# Patient Record
Sex: MALE | Race: WHITE | NOT HISPANIC OR LATINO | Employment: FULL TIME | URBAN - METROPOLITAN AREA
[De-identification: names, ages, dates, MRNs, and addresses within clinical notes are randomized per-mention and may not be internally consistent; named-entity substitution may affect disease eponyms.]

---

## 2017-01-12 ENCOUNTER — ALLSCRIPTS OFFICE VISIT (OUTPATIENT)
Dept: OTHER | Facility: OTHER | Age: 42
End: 2017-01-12

## 2017-01-17 ENCOUNTER — ALLSCRIPTS OFFICE VISIT (OUTPATIENT)
Dept: OTHER | Facility: OTHER | Age: 42
End: 2017-01-17

## 2017-02-23 ENCOUNTER — ALLSCRIPTS OFFICE VISIT (OUTPATIENT)
Dept: OTHER | Facility: OTHER | Age: 42
End: 2017-02-23

## 2017-02-23 DIAGNOSIS — I26.99 OTHER PULMONARY EMBOLISM WITHOUT ACUTE COR PULMONALE (HCC): ICD-10-CM

## 2017-03-01 ENCOUNTER — HOSPITAL ENCOUNTER (OUTPATIENT)
Dept: NON INVASIVE DIAGNOSTICS | Facility: CLINIC | Age: 42
Discharge: HOME | End: 2017-03-01
Payer: COMMERCIAL

## 2017-03-01 DIAGNOSIS — I26.99 OTHER PULMONARY EMBOLISM WITHOUT ACUTE COR PULMONALE (HCC): ICD-10-CM

## 2017-03-01 PROCEDURE — 93970 EXTREMITY STUDY: CPT

## 2017-03-09 ENCOUNTER — ALLSCRIPTS OFFICE VISIT (OUTPATIENT)
Dept: OTHER | Facility: OTHER | Age: 42
End: 2017-03-09

## 2017-04-13 ENCOUNTER — GENERIC CONVERSION - ENCOUNTER (OUTPATIENT)
Dept: OTHER | Facility: OTHER | Age: 42
End: 2017-04-13

## 2017-06-08 ENCOUNTER — ALLSCRIPTS OFFICE VISIT (OUTPATIENT)
Dept: OTHER | Facility: OTHER | Age: 42
End: 2017-06-08

## 2017-07-19 ENCOUNTER — ALLSCRIPTS OFFICE VISIT (OUTPATIENT)
Dept: OTHER | Facility: OTHER | Age: 42
End: 2017-07-19

## 2017-07-25 ENCOUNTER — ALLSCRIPTS OFFICE VISIT (OUTPATIENT)
Dept: OTHER | Facility: OTHER | Age: 42
End: 2017-07-25

## 2017-09-25 ENCOUNTER — ALLSCRIPTS OFFICE VISIT (OUTPATIENT)
Dept: OTHER | Facility: OTHER | Age: 42
End: 2017-09-25

## 2017-09-25 DIAGNOSIS — K76.0 FATTY (CHANGE OF) LIVER, NOT ELSEWHERE CLASSIFIED: ICD-10-CM

## 2017-09-25 DIAGNOSIS — Z13.220 ENCOUNTER FOR SCREENING FOR LIPOID DISORDERS: ICD-10-CM

## 2017-09-25 DIAGNOSIS — I10 ESSENTIAL (PRIMARY) HYPERTENSION: ICD-10-CM

## 2017-09-25 DIAGNOSIS — E55.9 VITAMIN D DEFICIENCY: ICD-10-CM

## 2017-11-30 ENCOUNTER — ALLSCRIPTS OFFICE VISIT (OUTPATIENT)
Dept: OTHER | Facility: OTHER | Age: 42
End: 2017-11-30

## 2017-11-30 LAB
FLUAV AG SPEC QL IA: NEGATIVE
INFLUENZA B AG (HISTORICAL): NEGATIVE

## 2017-12-08 ENCOUNTER — ALLSCRIPTS OFFICE VISIT (OUTPATIENT)
Dept: OTHER | Facility: OTHER | Age: 42
End: 2017-12-08

## 2017-12-08 DIAGNOSIS — J20.9 ACUTE BRONCHITIS: ICD-10-CM

## 2017-12-08 DIAGNOSIS — R04.2 HEMOPTYSIS: ICD-10-CM

## 2017-12-12 NOTE — PROGRESS NOTES
Assessment    1  Acute bronchitis (466 0) (J20 9)   2  Hemoptysis (786 30) (R04 2)    Plan  Acute bronchitis, Hemoptysis    · Promethazine-Codeine 6 25-10 MG/5ML Oral Syrup; TAKE 5 ML BY MOUTH ATBEDTIME AS NEEDED   · * XR CHEST PA & LATERAL; Status:Active; Requested for:36Btb6980;     Discussion/Summary    Bronchitis/Hemoptysis: will check CXR  Pt s/p abx - zithromax  likely your cough is lingering from bronchitis  continue with OTC cough medication during day as well as phernergan with codiene QHS  If no improvement by wednesday pt to contact office  If continues with hemoptysis schedule f/u however as discussed likely small ruptured vessel from repetitive coughing  The patient was counseled regarding instructions for management,-- risk factor reductions,-- prognosis,-- patient and family education,-- impressions  Chief Complaint    1  Cold Symptoms  Pt is here due to coughing      History of Present Illness  HPI: Pt was seen on 11/30 by Dr Natalie Shirley with URI  Was getting worse and was given a z-pack on 12/4 and his s/sx are improving, however still has a lingering cough  Cough:   Zan Duane presents with complaints of gradual onset of intermittent episodes of mild cough  Episodes started 2 weeks ago  Associated symptoms include wheezing, but-- no dyspnea,-- no chills,-- no fever,-- no runny nose,-- no stuffy nose,-- no sore throat,-- no myalgias-- and-- no chest pain  The patient presents with complaints of mild hemoptysis (bright red blood mixed with mucous)      Review of Systems   Constitutional: feeling tired, but-- no fever,-- not feeling poorly,-- no recent weight gain,-- no chills-- and-- no recent weight loss  ENT: hoarseness, but-- no earache,-- no nosebleeds,-- no sore throat,-- no hearing loss-- and-- no nasal discharge  Cardiovascular: the heart rate was not slow,-- no chest pain,-- the heart rate was not fast,-- no palpitations-- and-- no lower extremity edema    Respiratory: shortness of breath,-- cough-- and-- wheezing, but-- as noted in HPI-- and-- no shortness of breath during exertion  Gastrointestinal: no abdominal pain,-- no nausea,-- no vomiting,-- no constipation,-- no diarrhea-- and-- no blood in stools  Musculoskeletal: no arthralgias-- and-- no myalgias  Integumentary: a rash-- and-- itching  Neurological: headache-- and-- dizziness, but-- no numbness-- and-- no tingling  Active Problems  1  Acid reflux (530 81) (K21 9)   2  Acute upper respiratory infection (465 9) (J06 9)   3  Arrhythmia (427 9) (I49 9)   4  Cervicalgia (723 1) (M54 2)   5  Chronic insomnia (780 52) (F51 04)   6  Classic migraine (346 00) (G43 109)   7  Deep vein thrombosis (DVT) of left lower extremity (453 40) (I82 402)   8  Essential hypertension (401 9) (I10)   9  Extrinsic asthma (493 00) (J45 909)   10  Fatty liver (571 8) (K76 0)   11  Generalized anxiety disorder (300 02) (F41 1)   12  Liver enlargement (789 1) (R16 0)   13  Lupus anticoagulant positive (795 79) (R76 0)   14  BHARGAV (obstructive sleep apnea) (327 23) (G47 33)   15  Other acute pulmonary embolism (415 19) (I26 99)   16  Pulmonary embolism (415 19) (I26 99)   17  Screening for lipoid disorders (V77 91) (Z13 220)   18  Snoring (786 09) (R06 83)   19  Viral URI (465 9) (J06 9,B97 89)   20  Vitamin D deficiency (268 9) (E55 9)    Past Medical History  Active Problems And Past Medical History Reviewed: The active problems and past medical history were reviewed and updated today  Surgical History  Surgical History Reviewed: The surgical history was reviewed and updated today         Social History     · Alcohol Use (History)   · DRINKS ALCOHOL VERY INFREQUENTLY   · Caffeine Use   · HE ADMITS TO CONSUMING CAFFEINE VIA SODA ( 2 SERVINGS PER DAY)   · Chewing Tobacco Fine Cut   · Former smoker (C76 19) (I34 942)   · Marital History - Currently    · Never A Smoker   · Never Used Drugs   · Occupation:   ·   The social history was reviewed and updated today  The social history was reviewed and is unchanged  Family History  Family History Reviewed: The family history was reviewed and updated today  Current Meds   1  ALPRAZolam 0 5 MG Oral Tablet; TAKE 1 TABLET  PRN; Last Rx:86Osm6880 Ordered   2  Aspirin 81 MG TABS; TAKE 1 TABLET DAILY; Therapy: (Recorded:69Wav8264) to Recorded   3  Benzonatate 200 MG Oral Capsule; TAKE 1 CAPSULE 3 TIMES DAILY AS NEEDED; Therapy: 62YDG4603 to (Evaluate:65Rzy3096)  Requested for: 22UDS0165; Last Rx:30Nov2017 Ordered   4  Fluticasone Propionate 50 MCG/ACT Nasal Suspension; USE 1 TO 2 SPRAYS IN EACH NOSTRIL ONCE DAILY; Therapy: 82BHK5900 to (Last Rx:30Nov2017)  Requested for: 10WUB1311 Ordered   5  SUMAtriptan Succinate 25 MG Oral Tablet; TAKE 1 TABLET FOR MIGRAINE RELIEF  MAY REPEAT EVERY 2 HOURS  MAX 200MG/DAY; Therapy: 73BFT1795 to (Last Rx:17Oct2016) Ordered   6  TraMADol HCl - 50 MG Oral Tablet; TAKE 1 TABLET Every 6 hours PRN pain; Therapy: 32PVH3528 to (); Last Rx:34Jnm3335 Ordered   7  Venlafaxine HCl  MG Oral Capsule Extended Release 24 Hour; TAKE 2 CAPSULES DAILY  Requested for: 96Xdz8026; Last Rx:27Tya0896 Ordered   8  Zolpidem Tartrate 10 MG Oral Tablet; TAKE 1 TABLET AT BEDTIME; Therapy: 26MWW6222 to (Evaluate:45Dir2315); Last Rx:06Oct2017 Ordered    The medication list was reviewed and updated today  Allergies  1  Lidoderm PTCH  2  No Known Environmental Allergies   3  No Known Food Allergies  Denied    4  Lidocream CREA    Vitals   Recorded: [id number] 03:45PM   Temperature 97 9 F, Oral   Heart Rate 629   Systolic 271, LUE, Sitting   Diastolic 88, LUE, Sitting   Height 5 ft 8 in   Weight 210 lb 4 oz   BMI Calculated 31 97   BSA Calculated 2 09   O2 Saturation 98, RA       Physical Exam   Constitutional  General appearance: No acute distress, well appearing and well nourished  Eyes  Conjunctiva and lids: No swelling, erythema, or discharge     Pupils and irises: Equal, round and reactive to light  Ears, Nose, Mouth, and Throat  External inspection of ears and nose: Normal    Otoscopic examination: Tympanic membrance translucent with normal light reflex  Canals patent without erythema  Nasal mucosa, septum, and turbinates: Normal without edema or erythema  Oropharynx: Normal with no erythema, edema, exudate or lesions  -- No blood noted  Pulmonary  Respiratory effort: No increased work of breathing or signs of respiratory distress  Auscultation of lungs: Clear to auscultation, equal breath sounds bilaterally, no wheezes, no rales, no rhonci  -- no rhonchi or wheezing  Cardiovascular  Auscultation of heart: Normal rate and rhythm, normal S1 and S2, without murmurs  Musculoskeletal  Gait and station: Normal    Psychiatric  Orientation to person, place and time: Normal    Mood and affect: Normal          Future Appointments    Date/Time Provider Specialty Site   01/02/2018 05:00 PM Kapil Butler DO Internal Medicine [facility]       Signatures   Electronically signed by : [employer], [facility];  Dec  8 2017  5:14PM EST                       (Author)    Electronically signed by : Shawn Burkitt, MD; Dec 11 2017  5:21PM EST

## 2017-12-13 ENCOUNTER — TRANSCRIBE ORDERS (OUTPATIENT)
Dept: ADMINISTRATIVE | Age: 42
End: 2017-12-13

## 2017-12-13 ENCOUNTER — APPOINTMENT (OUTPATIENT)
Dept: RADIOLOGY | Age: 42
End: 2017-12-13
Payer: COMMERCIAL

## 2017-12-13 DIAGNOSIS — J20.9 ACUTE BRONCHITIS: ICD-10-CM

## 2017-12-13 DIAGNOSIS — R04.2 HEMOPTYSIS: ICD-10-CM

## 2017-12-13 PROCEDURE — 71020 HB CHEST X-RAY 2VW FRONTAL&LATL: CPT

## 2017-12-14 ENCOUNTER — ALLSCRIPTS OFFICE VISIT (OUTPATIENT)
Dept: OTHER | Facility: OTHER | Age: 42
End: 2017-12-14

## 2018-01-02 ENCOUNTER — ALLSCRIPTS OFFICE VISIT (OUTPATIENT)
Dept: OTHER | Facility: OTHER | Age: 43
End: 2018-01-02

## 2018-01-03 NOTE — PROGRESS NOTES
Assessment   1  Acute upper respiratory infection (465 9) (J06 9)   2  Cough (786 2) (R05)    Plan   Acute bronchitis    · LevoFLOXacin 500 MG Oral Tablet (Levaquin)   · PredniSONE 10 MG Oral Tablet  Acute bronchitis, Acute upper respiratory infection    · Albuterol Sulfate (2 5 MG/3ML) 0 083% Inhalation Nebulization Solution   · MINI NEBULIZER- POC; Status:Complete - Retrospective By Protocol Authorization;   Done:    95WQN2803 05:44PM  Acute upper respiratory infection, Cough    · ProAir  (90 Base) MCG/ACT Inhalation Aerosol Solution; INHALE 1 TO 2 PUFFS EVERY    4 TO 6 HOURS AS NEEDED  Generalized anxiety disorder    · ALPRAZolam 0 5 MG Oral Tablet; TAKE 1 TABLET  PRN   · Venlafaxine HCl  MG Oral Capsule Extended Release 24 Hour (Effexor XR); TAKE 2    CAPSULES DAILY    Discussion/Summary   Discussion Summary:    Pt counseled to take levaquin with food to be taken in am with food - instructed proper way to take this  next week if not improving offered to keep pt out of work for a few days to help him recouperate but he states he is unable to do so also instructed to report any hemoptysis or SOB  Chief Complaint   Chief Complaint Free Text Note Form: pt  presents for follow up for URI  pt  states has cough with mucous  pt  states has been taking Promethazine with codeine  pt  would like Med refills      History of Present Illness   HPI: f/u from [address] [place] [address] Rd 12/8/17  Pt was initially seen 11/30/17 for URI and tx with zpak which he reports did not improve his symptoms, he has continued with cough with coughing fits and wheezing noted, worse at night  Pt was given promethazine/codeine which he has taken at bedtime which helps sleep somewhat but still wakes up coughing  Hx of tobacco use but denies current use  He had also noted hemoptysis at last visit (small blood tinged sputum) which he reports is gone now   CXR done 12/13/17 negative for pneumonia f/up from last visit, completed prednisone and levoquin, had negative CXR, 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, all other s/s resolved  denies fevers or chills, has continued to work bc he isn't able to take off and he works outside in the cold air which seems to exacerbate his symptoms      Review of Systems   Complete-Male:      Constitutional: feeling tired, but-- no fever,-- not feeling poorly-- and-- no chills  Eyes: no eye pain-- and-- no purulent discharge from the eyes  ENT: no earache-- and-- no nasal discharge  Cardiovascular: no chest pain,-- no palpitations-- and-- no extremity edema  Respiratory: cough-- and-- wheezing, but-- as noted in HPI  Gastrointestinal: no abdominal pain-- and-- no constipation  Genitourinary: no dysuria  Musculoskeletal: no arthralgias-- and-- no myalgias  Integumentary: no rashes-- and-- no itching  Neurological: no headache-- and-- no dizziness  Hematologic/Lymphatic: no swollen glands  Active Problems   1  Acid reflux (530 81) (K21 9)   2  Acute bronchitis (466 0) (J20 9)   3  Acute upper respiratory infection (465 9) (J06 9)   4  Arrhythmia (427 9) (I49 9)   5  Cervicalgia (723 1) (M54 2)   6  Chronic insomnia (780 52) (F51 04)   7  Classic migraine (346 00) (G43 109)   8  Deep vein thrombosis (DVT) of left lower extremity (453 40) (I82 402)   9  Essential hypertension (401 9) (I10)   10  Extrinsic asthma (493 00) (J45 909)   11  Fatty liver (571 8) (K76 0)   12  Generalized anxiety disorder (300 02) (F41 1)   13  Hemoptysis (786 30) (R04 2)   14  Liver enlargement (789 1) (R16 0)   15  Lupus anticoagulant positive (795 79) (R76 0)   16  BHARGAV (obstructive sleep apnea) (327 23) (G47 33)   17  Other acute pulmonary embolism (415 19) (I26 99)   18  Pulmonary embolism (415 19) (I26 99)   19  Snoring (786 09) (R06 83)   20  Vitamin D deficiency (268 9) (E55 9)    Past Medical History   1  History of Abdominal pain (789 00) (R10 9)   2   History of Accelerated essential hypertension (401 0) (I10)   3  History of Acute conjunctivitis of right eye, unspecified acute conjunctivitis type (372 00) (H10 31)   4  History of Acute intractable headache, unspecified headache type (784 0) (R51)   5  Denied: History of Carrier Of STD   6  History of Colitis (558 9) (K52 9)   7  History of Elevated ALT measurement (790 4) (R74 0)   8  History of Encounter for male sterilization procedure (V25 2) (Z30 2)   9  History of Encounter for sterilization (V25 2) (Z30 2)   10  History of headache (V13 89) (Z87 898)   11  History of sinusitis (V12 69) (Z87 09)   12  History of syncope (V15 89) (Z87 898)   13  History of syncope (V15 89) (Z87 898)   14  History of Holter monitor, abnormal (794 31) (R94 31)   15  History of Knee pain (719 46) (M25 569)   16  History of Meniscus tear (836 2) (S83 209A)   17  Denied: History of Mental Status Change   18  History of Nonvenomous Insect Bite (E906 4)   19  History of Viral URI (465 9) (J06 9,B97 89)    Surgical History   1  History of Hernia Repair   2  History of Knee Arthroscopy (Therapeutic)   3  History of Shoulder Surgery Left   4  History of Surgery Vas Deferens Vasectomy    Family History   Mother    1  Family history of Type 2 Diabetes Mellitus  Father    2  Family history of hypertension (V17 49) (Z82 49)   3  Family history of Type 2 Diabetes Mellitus  Sister    4  Family history of Cancer  Brother    5  Family history of Cancer   6  Family history of hypertension (V17 49) (Z82 49)   7  Family history of Fatty Liver  Grandparent    8  Family history of Cancer  Paternal Grandmother    5  Family history of hypertension (V17 49) (Z82 49)   10  Family history of Type 2 Diabetes Mellitus  Maternal Grandfather    11  Family history of Cancer   12  Family history of Family Health Status 3  Children Living   15  Family history of Travel  Paternal Grandfather    15  Family history of hypertension (V17 49) (Z82 49)  Maternal Aunt    13   Family history of Cancer    Social History    · Alcohol Use (History)   · Caffeine Use   · Chewing Tobacco Fine Cut   · Former smoker (Q11 76) (Z62 058)   · Marital History - Currently    · Never A Smoker   · Never Used Drugs   · Occupation:    Current Meds    1  ALPRAZolam 0 5 MG Oral Tablet; TAKE 1 TABLET  PRN; Last Rx:79Ktn5418 Ordered   2  Aspirin 81 MG TABS; TAKE 1 TABLET DAILY; Therapy: (Recorded:55Ete9993) to Recorded   3  Fluticasone Propionate 50 MCG/ACT Nasal Suspension; USE 1 TO 2 SPRAYS IN EACH NOSTRIL     ONCE DAILY; Therapy: 19ZYE6617 to (Last Rx:30Nov2017)  Requested for: 79IZD1638 Ordered   4  LevoFLOXacin 500 MG Oral Tablet; TAKE 1 TABLET DAILY AS DIRECTED; Therapy: 76KGT4654 to (Evaluate:21Lno2002)  Requested for: 85Mxo6688; Last Rx:58Hrf6112     Ordered   5  PredniSONE 10 MG Oral Tablet; 4 tabs x 2 days then 3 tabs x 2 days then 2 tabs x 2 days     then 1 tab x 2 days; Therapy: 46SWZ8743 to (Last Rx:08Kqm7291)  Requested for: 68JHQ6629 Ordered   6  Promethazine-Codeine 6 25-10 MG/5ML Oral Syrup; TAKE 5 ML EVERY 4 TO 6 HOURS AS NEEDED; Therapy: 64XZB4132 to (Evaluate:12Png2313); Last Rx:54Wmr3358 Ordered   7  SUMAtriptan Succinate 25 MG Oral Tablet; TAKE 1 TABLET FOR MIGRAINE RELIEF  MAY REPEAT     EVERY 2 HOURS  MAX 200MG/DAY; Therapy: 63EFJ9052 to (Last Rx:17Oct2016) Ordered   8  TraMADol HCl - 50 MG Oral Tablet; TAKE 1 TABLET Every 6 hours PRN pain; Therapy: 86SZB7058 to (); Last Rx:82Hfz7900 Ordered   9  Venlafaxine HCl  MG Oral Capsule Extended Release 24 Hour; TAKE 2 CAPSULES DAILY      Requested for: 22Jtb5508; Last Rx:25Rti2526 Ordered   10  Zolpidem Tartrate 10 MG Oral Tablet; TAKE 1 TABLET AT BEDTIME; Therapy: 75WTO7362 to (Evaluate:69Ymg9425); Last Rx:06Oct2017 Ordered  Medication List Reviewed: The medication list was reviewed and updated today  Allergies   1  Lidoderm PTCH  2  No Known Environmental Allergies   3   No Known Food Allergies  Denied    4  Lidocream CREA    Vitals   Vital Signs    Recorded: 27NBB2352 04:44PM   Temperature 97 1 F, Tympanic   Heart Rate 92   Systolic 242, LUE, Sitting   Diastolic 90, LUE, Sitting   Height 5 ft 8 in   Weight 216 lb 2 oz   BMI Calculated 32 86   BSA Calculated 2 11   O2 Saturation 99, RA     Physical Exam        Constitutional      General appearance: No acute distress, well appearing and well nourished  Eyes      Conjunctiva and lids: No swelling, erythema, or discharge  Ears, Nose, Mouth, and Throat      External inspection of ears and nose: Normal        Otoscopic examination: Tympanic membrance translucent with normal light reflex  Canals patent without erythema  Nasal mucosa, septum, and turbinates: Normal without edema or erythema  Oropharynx: Normal with no erythema, edema, exudate or lesions  Pulmonary      Auscultation of lungs: Abnormal  -- exp wheeze b/l (pronlonged exp) no rhonchi  Cardiovascular      Auscultation of heart: Normal rate and rhythm, normal S1 and S2, without murmurs  Lymphatic      Palpation of lymph nodes in neck: No lymphadenopathy  Musculoskeletal      Gait and station: Normal        Skin      Skin and subcutaneous tissue: Normal without rashes or lesions  Psychiatric      Orientation to person, place and time: Normal        Mood and affect: Normal           Results/Data   MINI NEBULIZER- POC [id number] 05:44PM Kaylin Baltazar      Test Name Result Flag Reference   Romi Jones [id number]          Procedure           Treatment #1 included Albuterol Sulfate 2 5 mg  After the first nebulizer treatment, examination at 10 revealed  After treatment #1, the patient noted symptomatic improvement  Air exchange was improved  No wheezes were appreciated  No rhonchi were appreciated  No rales were appreciated                          Signatures    Electronically signed by : Nohelia Barrera DO; Jan 2 2018  6:00PM EST (Author)

## 2018-01-09 NOTE — MISCELLANEOUS
To Whom This May Concern,    Mr  Kaila Grier has recently undergone testing that confirms he requires Continuous Airway Pressure for use at night  A CPAP machine, mask, tubing, filters, and other required equipment are medically  necessary for the patient's use to properly address his worsening health condition of obstructive sleep apnea  For further questions, please contact our office       Sincerely,   Reema Chang PA-C      Electronically signed Erika Flower [facility]  Apr 13 2017  5:25PM EST

## 2018-01-10 NOTE — RESULT NOTES
Verified Results  (1) PT WITH INR [id number] 03:58PM Hayley Archana     Test Name Result Flag Reference   INR 2 49 H 0 86-1 16   PT 25 7 seconds H 11 8-14 1

## 2018-01-12 VITALS
DIASTOLIC BLOOD PRESSURE: 82 MMHG | OXYGEN SATURATION: 98 % | BODY MASS INDEX: 33.38 KG/M2 | SYSTOLIC BLOOD PRESSURE: 122 MMHG | TEMPERATURE: 98.1 F | WEIGHT: 220.25 LBS | HEIGHT: 68 IN | HEART RATE: 91 BPM

## 2018-01-12 NOTE — RESULT NOTES
Verified Results  (1) PT WITH INR [id number] 12:46PM Meg Red     Test Name Result Flag Reference   INR 1 28 H 0 86-1 16   PT 16 0 seconds H 12 0-14 3

## 2018-01-13 VITALS
OXYGEN SATURATION: 99 % | HEART RATE: 92 BPM | DIASTOLIC BLOOD PRESSURE: 82 MMHG | TEMPERATURE: 97.2 F | WEIGHT: 220.38 LBS | HEIGHT: 67 IN | RESPIRATION RATE: 16 BRPM | SYSTOLIC BLOOD PRESSURE: 120 MMHG | BODY MASS INDEX: 34.59 KG/M2

## 2018-01-13 VITALS
RESPIRATION RATE: 18 BRPM | BODY MASS INDEX: 32.61 KG/M2 | OXYGEN SATURATION: 98 % | TEMPERATURE: 98.5 F | HEART RATE: 88 BPM | DIASTOLIC BLOOD PRESSURE: 64 MMHG | SYSTOLIC BLOOD PRESSURE: 96 MMHG | HEIGHT: 68 IN | WEIGHT: 215.2 LBS

## 2018-01-13 VITALS
TEMPERATURE: 96.9 F | HEIGHT: 67 IN | RESPIRATION RATE: 16 BRPM | OXYGEN SATURATION: 99 % | WEIGHT: 211.38 LBS | DIASTOLIC BLOOD PRESSURE: 82 MMHG | SYSTOLIC BLOOD PRESSURE: 130 MMHG | HEART RATE: 94 BPM | BODY MASS INDEX: 33.18 KG/M2

## 2018-01-13 VITALS
HEIGHT: 67 IN | BODY MASS INDEX: 33.59 KG/M2 | WEIGHT: 214 LBS | TEMPERATURE: 97.9 F | SYSTOLIC BLOOD PRESSURE: 118 MMHG | OXYGEN SATURATION: 98 % | DIASTOLIC BLOOD PRESSURE: 90 MMHG | HEART RATE: 91 BPM

## 2018-01-13 NOTE — PROCEDURES
Procedures by Sacha Yadav MD  at 10/10/2016  4:59 PM      Author:  Sacha Yadav MD Service:  Neurology Author Type:  Physician     Filed:  10/10/2016  5:04 PM Date of Service:  10/10/2016  4:59 PM Status:  Signed     :  Sacha Yadav MD (Physician)            ELECTROENCEPHALOGRAM (EEG)      Patient Name:  Andreas Whitlock  MRN: [id number]   :  1975 File #: Benoit Franco 13   Age: 46 y o  Encounter #: [id number]   Date performed:10/10/16            Report date: 10/10/2016          Study type: Routine EEG    ICD 10 diagnosis: Spells/Fit NOS R56 9    Start time: 14:44 End time: 15:22     -------------------------------------------------------------------------------------------------------------------   Patient History:  46year old male with history of DVT and PE presents with acute change in mental status  On day of admission, patient awoke with nausea and vomiting with 2 days  of diarrhea prior  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 and went to lie down  When wife went to check on him, patient was unresponsive and eyes were rolled to back of his head  EMS was called  It was reported  that patient was in and out of consciousness, but he only remembers being at the game and then waking up in the hospital  Patient had no seizure like movements  PMH of seizures or family history EEG was ordered to check for epileptiform  discharges      Medications include:   magnesium oxide 400 mg Oral Daily   nicotine 1 patch Transdermal Daily   venlafaxine 300 mg Oral Daily   warfarin 2 5 mg Oral Once per day on Sun Tue Wed Thu Sat   warfarin 5 mg Oral Once per day on        -------------------------------------------------------------------------------------------------------------------   Description of Procedure:  ·  32 channel digital recording with electrodes placed according to the International 10-20 system with additional  T1/T2 electrodes, EOG, EKG, and simultaneous  video  The recording was technically satisfactory  -------------------------------------------------------------------------------------------------------------------   Results:  Background Activity:  The recording was performed with the patient awake  During wakefulness, there were long runs of well regulated/regular,  mid amplitude, posteriorly dominant, symmetric 8-9 Hz activity that  did attenuate with eye opening      -------------------------------------------------------------------------------------------------------------------   Activation Procedures:  Hyperventilation was performed for 3-4  minutes and did not produce any changes  Stepped photic stimulation between 1-20 cps was performed and  did not induce any changes  -------------------------------------------------------------------------------------------------------------------   Abnormal Findings:  See Background Activity  No focal abnormalities or interictal epileptiform discharges were noted  No electrographic seizures were observed during this recording     -------------------------------------------------------------------------------------------------------------------  Other Findings: The single lead ECG demonstrated a regular rhythm     -------------------------------------------------------------------------------------------------------------------  Events:  No patient push button events  -------------------------------------------------------------------------------------------------------------------   EEG Interpretation:   Normal 30 minute EEG  Scribe Attestation:  Documented by Santiago Bloom, acting as a scribe for Dr Jose Blum on 10/10/2016  at 5:03 PM     Physician Attestation:  All documentation recorded by the scribe accurately reflects the service I personally performed and the decisions made by me   I was present during the time the encounter was recorded and have reviewed all the documentation and confirm that it is all accurate  and representative of the encounter  Elyse Vega MD   Medical Director  [address] [facility]  Pager # [phone]    Oct 10 2016  5:04PM [facility] Standard Time

## 2018-01-14 VITALS
BODY MASS INDEX: 31.7 KG/M2 | SYSTOLIC BLOOD PRESSURE: 130 MMHG | TEMPERATURE: 98.1 F | OXYGEN SATURATION: 98 % | WEIGHT: 214 LBS | HEIGHT: 69 IN | HEART RATE: 80 BPM | DIASTOLIC BLOOD PRESSURE: 88 MMHG

## 2018-01-14 VITALS
HEIGHT: 68 IN | OXYGEN SATURATION: 98 % | DIASTOLIC BLOOD PRESSURE: 78 MMHG | TEMPERATURE: 99.4 F | SYSTOLIC BLOOD PRESSURE: 108 MMHG | HEART RATE: 92 BPM | BODY MASS INDEX: 32.28 KG/M2 | WEIGHT: 213 LBS

## 2018-01-14 NOTE — PROGRESS NOTES
Chief Complaint  pt  presents for Holter Monitor hook up  Started at 4:45 PM  ordered by Dr Jazzy Feng  Dx: 149 9 Holter Removed at 4:47 PM      Active Problems    1  Accelerated essential hypertension (401 0) (I10)   2  Acid reflux (530 81) (K21 9)   3  History of Active smoker (305 1) (Z72 0)   4  Acute intractable headache, unspecified headache type (784 0) (R51)   5  Arrhythmia (427 9) (I49 9)   6  Cervicalgia (723 1) (M54 2)   7  Deep vein thrombosis (DVT) of left lower extremity (453 40) (I82 402)   8  Essential hypertension (401 9) (I10)   9  Extrinsic asthma (493 00) (J45 909)   10  Fatty liver (571 8) (K76 0)   11  Generalized anxiety disorder (300 02) (F41 1)   12  Headache (784 0) (R51)   13  Liver enlargement (789 1) (R16 0)   14  BHARGAV (obstructive sleep apnea) (327 23) (G47 33)   15  Other acute pulmonary embolism (415 19) (I26 99)   16  Sleep disorder (780 50) (G47 9)   17  Snoring (786 09) (R06 83)   18  Syncope (780 2) (R55)   19  Vitamin D deficiency (268 9) (E55 9)    Current Meds   1  ALPRAZolam 0 5 MG Oral Tablet; TAKE 1 TABLET  PRN; Therapy: (Recorded:17Oct2016) to Recorded   2  Eliquis 5 MG Oral Tablet; Take 1 tablet twice daily; Therapy: 18YMK0896 to (Evaluate:03Ncv8677); Last Rx:07Nov2016 Ordered   3  Gabapentin 100 MG Oral Capsule; TAKE 1 CAPSULE AT BEDTIME; Therapy: 47SHZ6544 to (Last Rx:17Oct2016)  Requested for: 17Oct2016 Ordered   4  Magnesium Oxide 400 MG Oral Tablet; TAKE 1 TABLET DAILY; Therapy: (Recorded:12Oct2016) to Recorded   5  Riboflavin 100 MG TABS; TAKE 1 TABLET DAILY; Therapy: (Recorded:12Oct2016) to Recorded   6  SUMAtriptan Succinate 25 MG Oral Tablet; TAKE 1 TABLET FOR MIGRAINE RELIEF    MAY REPEAT EVERY 2 HOURS  MAX 200MG/DAY; Therapy: 96VQO1348 to (Last Rx:17Oct2016) Ordered   7  TraMADol HCl - 50 MG Oral Tablet; Take one tablet every six hours as needed for pain; Therapy: 28EOF6944 to (Last Rx:17Oct2016) Ordered   8   Venlafaxine HCl  MG Oral Capsule Extended Release 24 Hour; TAKE 2   CAPSULES DAILY  Requested for: 14Yjd8380; Last Rx:48Dqm7545 Ordered   9  Zolpidem Tartrate 10 MG Oral Tablet; TAKE 1 TABLET AT BEDTIME AS NEEDED    Requested for: 40UHL9557; Last Rx:80Lkg4838 Ordered    Allergies    1  Lidoderm PTCH    2  No Known Environmental Allergies   3  No Known Food Allergies  Denied    4   Lidocream CREA    Future Appointments    Date/Time Provider Specialty Site   03/03/2017 11:40 AM Sonja Hayes MD Neurology [facility] [address] [place] [address]   01/17/2017 05:45 PM Mary Rodriguez DO Internal Medicine [facility]     Signatures   Electronically signed by : Delvis Umana DO; Nov 10 2016  2:05PM EST

## 2018-01-15 NOTE — RESULT NOTES
Verified Results  (1) THROMBOSIS PANEL [id number] 12:54PM Frank Dempsey Order Number: [id number]     Test Name Result Flag Reference   ANTI-THROM  % of Normal     Performing Comments: please include protein c,s, factor 5 leiden  Performing Comments: please include protein c,s, factor 5 leiden  CARDLIP IGA AB <9 APL U/mL  0 - 11   Negative:              <12                            Indeterminate:     12 - 20                            Low-Med Positive: >20 - 80                            High Positive:         >80   CARDLIP IGG AB <9 GPL U/mL  0 - 14   Negative:              <15                            Indeterminate:     15 - 20                            Low-Med Positive: >20 - 80                            High Positive:         >80   CARDLIP IGM AB <9 MPL U/mL  0 - 12   Negative:              <13                            Indeterminate:     13 - 20                            Low-Med Positive: >20 - 80                            High Positive:         >80  Performing Comments: please include protein c,s, factor 5 leiden  Performed at:  [address] [place] [address]  [id number]  : Miranda Kaiser MD, Phone:  [phone]   DILUTE PROTHROMBIN TIME(DPT) 63 1 sec H 0 0 - 55 0   DPT CONFIRM RATIO 0 98 Ratio  0 00 - 1 40   DILUTE STEPHANY VIPER VENOM TIME 69 1 sec H 0 0 - 44 0   PROTEIN C 132 % of Normal     Performing Comments: please include protein c,s, factor 5 leiden  Performing Comments: please include protein c,s, factor 5 leiden  PROTEIN S ACTIVITY 164 % H 63 - 140   An elevated protein S activity is of no known clinical significance  Protein S activity may be falsely increased (masking an abnormal, low  result) in patients receiving direct Xa inhibitor (e g , rivaroxaban,  apixaban, edoxaban) or a direct thrombin inhibitor (e g , dabigatran)  anticoagulant treatment due to assay interference by these drugs    Performing Comments: please include protein c,s, factor 5 leiden  Performed at:  [address]  [id number]  : Gini Causey MD, Phone:  [phone]   FREE PROTEIN S 128 %  57 - 157   Performing Comments: please include protein c,s, factor 5 leiden  Performed at:  [address]  [id number]  : Gini Causey MD, Phone:  [phone]   TOTAL PROTEIN S 128 %  60 - 150   BETA-2 GLYCOPROTEIN I AB,IGG <9 GPI IgG units  0 - 20   The reference interval reflects a 3SD or 99th percentile interval,  which is thought to represent a potentially clinically significant  result in accordance with the International Consensus Statement on  the classification criteria for definitive antiphospholipid syndrome  (APS)  J Thromb Haem 2006;4:295-306  BETA-2 GLYCOPROTEIN I AB,IGM <9 GPI IgM units  0 - 32   The reference interval reflects a 3SD or 99th percentile interval,  which is thought to represent a potentially clinically significant  result in accordance with the International Consensus Statement on  the classification criteria for definitive antiphospholipid syndrome  (APS)  J Thromb Haem 2006;4:295-306  Performing Comments: please include protein c,s, factor 5 leiden  Performed at:  [address]  [id number]  : Gini Causey MD, Phone:  [phone]   Wiliam Meredith De Gasperi 88 <9 GPI IgA units  0 - 25   The reference interval reflects a 3SD or 99th percentile interval,  which is thought to represent a potentially clinically significant  result in accordance with the International Consensus Statement on  the classification criteria for definitive antiphospholipid syndrome  (APS)  J Thromb Haem 2006;4:295-306     PTT LUPUS ANTICOAGULANT 45 0 sec H 0 0 - 40 6   THROMBIN TIME (DRVW) 16 6 sec  0 0 - 20 9   LUPUS REFLEX INTERPRETATION Comment:     Results are consistent with the presence of a lupus anticoagulant  NOTE: Only persistent lupus anticoagulants are thought to be of  clinical significance  For this reason, repeat testing in 12 or more  weeks after an initial positive result should be considered to  confirm or refute the presence of a lupus anticoagulant,  depending on clinical presentation  Performing Comments: please include protein c,s, factor 5 leiden        Performed at:  [address]  [id number]  : Maninder Adams MD, Phone:  [phone]

## 2018-01-16 NOTE — PROGRESS NOTES
Assessment    1  Deep vein thrombosis (DVT) of left lower extremity (453 40) (I82 402)    Plan  Arrhythmia, Deep vein thrombosis (DVT) of left lower extremity    · (1) APTT; Status:Active; Requested RID:[id number];    Perform:[facility]; KEC:28TGZ7346; Ordered; For:Arrhythmia, Deep vein thrombosis (DVT) of left lower extremity; Ordered By:Lela Frazier;   · (1) CBC/PLT/DIFF; Status:Active; Requested MZX:67BFP0125;    Perform:[facility]; PLM:54KRY1962; Ordered; For:Arrhythmia, Deep vein thrombosis (DVT) of left lower extremity; Ordered By:Lela Frazier;   · (1) D-DIMER; Status:Active; Requested NILDA:[id number];    Perform:[facility]; DTX:13WBJ2019; Ordered; For:Arrhythmia, Deep vein thrombosis (DVT) of left lower extremity; Ordered By:Lela Frazier;   · (1) LUPUS ANTICOAGULANT PROFILE; Status:Active; Requested EGT:37GHH3114;    Perform:[facility]; EKW:83ADJ3040; Ordered; For:Arrhythmia, Deep vein thrombosis (DVT) of left lower extremity; Ordered By:Lela Frazier;   · (1) PT WITH INR; Status:Active; Requested ETY:65UKH5664;    Perform:[facility]; GJI:10FWW4684; Ordered; For:Arrhythmia, Deep vein thrombosis (DVT) of left lower extremity; Ordered By:Lela Frazier;   · Follow-up visit in 3 months Evaluation and Treatment  Follow-up  Status: Complete   Done: 23RDD1699 02:30PM   Ordered; For: Arrhythmia, Deep vein thrombosis (DVT) of left lower extremity; Ordered By: Lexi Davis Performed:  Due: 47ZGW0435; Last Updated By: Noemí Weiss; 3/9/2017 11:28:20 AM    Discussion/Summary  Discussion Summary:   81-year-old male presents for follow-up regarding DVT/anticoagulation  He had second surgery on his left knee in October 2015 and was sent home on aspirin and 3 days later he was hospitalized with blood clot in the leg and lungs  He was sent home on warfarin  In October 2016 he had headache and he passed out  CT scan of brain and MRI scan of brain was negative for acute problem   Warfarin was changed to ELIQUIS 5 mg twice a day and that's what he has been taking and question is how long he has to stay on ELIQUIS  In December 2016 he had hypercoagulation workup that showed positive lupus anticoagulant  There is no history of blood clot prior to October 2015  There is no family history of blood clot  There is no history of bleeding  Repeat laboratory studies in February 2017 showed negative lupus anticoagulant  Repeat venous Doppler study on 3/1/17 showed chronic 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 and paired peroneal veins at midcalf; no superficial thrombophlebitis; no new acute Plan is to continue anticoagulation with Eliquis 2 5 mg b i d  Patient to have laboratory studies in 3 months  If negative and lupus anticoagulant at that time, patient will be transitioned to 81 mg aspirin daily for anticoagulation  Discussed the above with patient  Patient voiced understanding and agreement  He is aware to contact our office with symptoms/questions in the interim  Goals and Barriers: The patient has the current Goals: Anticoagulation minimum 3 more months  The patent has the current Barriers: None  Patient's Capacity to Self-Care: Patient is able to Self-Care  Medication SE Review and Pt Understands Tx: Possible side effects of new medications were reviewed with the patient/guardian today  The treatment plan was reviewed with the patient/guardian  The patient/guardian understands and agrees with the treatment plan   Counseling Documentation With Imm: The patient was counseled regarding diagnostic results, instructions for management, prognosis, patient and family education, impressions, risks and benefits of treatment options, importance of compliance with treatment  total time of encounter was 15 minutes  Chief Complaint  Chief Complaint: Chief Complaint:   The patient presents to the office today with f/ regarding DVT        History of Present Illness  HPI: 46year old male presents for follow-up regarding DVT  He states he had second surgery on his left knee in October 2015 and was sent home on aspirin and 3 days later he was hospitalized with blood clot in the leg and lungs  He was sent home on warfarin  In October 2016 he had headache and he passed out  CT scan of brain and MRI scan of brain was negative for acute problem  Warfarin was changed to ELIQUIS 5 mg twice a day and that's what he has been taking and question is how long he has to stay on ELIQUIS  In December 2016 he had hypercoagulation workup that showed positive lupus anticoagulant  There is no history of blood clot prior to October 2015  There is no family history of blood clot  There is no history of bleeding  Interval History: No complaints today      Review of Systems  Complete-Male:   Constitutional: no fever, not feeling poorly, no chills, not feeling tired and no recent weight loss  Eyes: no eye pain, no eyesight problems, no dryness of the eyes, eyes not red and no itching of the eyes  ENT: no complaints of earache, no hearing loss, no nosebleeds, no nasal discharge, no sore throat, no hoarseness  Cardiovascular: no chest pain, no palpitations and no extremity edema  Respiratory: No complaints of shortness of breath, no wheezing, no cough, no SOB on exertion, no orthopnea or PND  Gastrointestinal: No complaints of abdominal pain, no constipation, no nausea or vomiting, no diarrhea or bloody stools  Genitourinary: no dysuria, no urinary hesitancy, no incontinence and no nocturia  Musculoskeletal: joint stiffness, but no joint swelling, no limb pain, no myalgias and no limb swelling    The patient presents with complaints of arthralgias (osteoarthritis of left knee)  Integumentary: No complaints of skin rash or skin lesions, no itching, no skin wound, no dry skin     Neurological: No compliants of headache, no confusion, no convulsions, no numbness or tingling, no dizziness or fainting, no limb weakness, no difficulty walking  Psychiatric: anxiety  Endocrine: no muscle weakness and no feelings of weakness  Hematologic/Lymphatic: No complaints of swollen glands, no swollen glands in the neck, does not bleed easily, no easy bruising  ROS Reviewed:   ROS reviewed  Active Problems    1  Accelerated essential hypertension (401 0) (I10)   2  Acid reflux (530 81) (K21 9)   3  History of Active smoker (305 1) (Z72 0)   4  Acute intractable headache, unspecified headache type (784 0) (R51)   5  Arrhythmia (427 9) (I49 9)   6  Cervicalgia (723 1) (M54 2)   7  Deep vein thrombosis (DVT) of left lower extremity (453 40) (I82 402)   8  Essential hypertension (401 9) (I10)   9  Extrinsic asthma (493 00) (J45 909)   10  Fatty liver (571 8) (K76 0)   11  Generalized anxiety disorder (300 02) (F41 1)   12  Holter monitor, abnormal (794 31) (R94 31)   13  Liver enlargement (789 1) (R16 0)   14  Lupus anticoagulant positive (795 79) (R76 0)   15  BHARGAV (obstructive sleep apnea) (327 23) (G47 33)   16  Other acute pulmonary embolism (415 19) (I26 99)   17  Pulmonary embolism (415 19) (I26 99)   18  Sleep disorder (780 50) (G47 9)   19  Snoring (786 09) (R06 83)   20  Syncope (780 2) (R55)   21  Vitamin D deficiency (268 9) (E55 9)    Past Medical History    1  History of Abdominal pain (789 00) (R10 9)   2  Denied: History of Carrier Of STD   3  History of Colitis (558 9) (K52 9)   4  History of Cough (786 2) (R05)   5  History of Elevated ALT measurement (790 4) (R74 0)   6  History of Encounter for male sterilization procedure (V25 2) (Z30 2)   7  History of Encounter for sterilization (V25 2) (Z30 2)   8  History of headache (V13 89) (Z87 898)   9  History of sinusitis (V12 69) (Z87 09)   10  History of syncope (V15 89) (Z87 898)   11  History of Knee pain (719 46) (M25 569)   12  History of Meniscus tear (836 2) (S83 209A)   13  Denied: History of Mental Status Change   14   History of Nonvenomous Insect Bite (E906 4)   15  History of Viral URI (465 9) (J06 9,B97 89)    Surgical History    1  History of Hernia Repair   2  History of Knee Arthroscopy (Therapeutic)   3  History of Shoulder Surgery Left   4  History of Surgery Vas Deferens Vasectomy  Surgical History Reviewed: The surgical history was reviewed and updated today  Family History  Mother    1  Family history of Type 2 Diabetes Mellitus  Father    2  Family history of hypertension (V17 49) (Z82 49)   3  Family history of Type 2 Diabetes Mellitus  Sister    4  Family history of Cancer  Brother    5  Family history of Cancer   6  Family history of hypertension (V17 49) (Z82 49)   7  Family history of Fatty Liver  Grandparent    8  Family history of Cancer  Paternal Grandmother    5  Family history of hypertension (V17 49) (Z82 49)   10  Family history of Type 2 Diabetes Mellitus  Maternal Grandfather    11  Family history of Cancer   12  Family history of Family Health Status 3  Children Living   15  Family history of Travel  Paternal Grandfather    15  Family history of hypertension (V17 49) (Z82 49)  Maternal Aunt    13  Family history of Cancer  Family History Reviewed: The family history was reviewed and updated today  Social History    · History of Active smoker (305 1) (Z72 0)   · Alcohol Use (History)   · Caffeine Use   · Chewing Tobacco Fine Cut   · Former smoker (A22 82) (Z87 891)   · Marital History - Currently    · Never A Smoker   · Never Used Drugs   · Occupation:  Social History Reviewed: The social history was reviewed and updated today  The social history was reviewed and is unchanged  Current Meds   1  ALPRAZolam 0 5 MG Oral Tablet; TAKE 1 TABLET  PRN; Therapy: (Recorded:01Hrh8950) to Recorded   2  Eliquis 2 5 MG Oral Tablet; Take 2 tablets daily; Therapy: 88VSI3319 to (Evaluate:21Lna4637)  Requested for: 72GJN2721 Recorded   3   Fluticasone Propionate 50 MCG/ACT Nasal Suspension; USE 1 SPRAY IN EACH   NOSTRIL ONCE DAILY; Therapy: 97XDO7526 to (Last Rx:12Jan2017)  Requested for: 12Jan2017 Ordered   4  Gabapentin 100 MG Oral Capsule; TAKE 1 CAPSULE AT BEDTIME; Therapy: 66CCJ3167 to (Last Rx:17Oct2016)  Requested for: 17Oct2016 Ordered   5  Promethazine-Codeine 6 25-10 MG/5ML Oral Syrup; TAKE 5 ML EVERY 4 TO 6 HOURS   AS NEEDED; Therapy: 69LXS6908 to (Evaluate:25Jan2017); Last Rx:17Jan2017 Ordered   6  SUMAtriptan Succinate 25 MG Oral Tablet; TAKE 1 TABLET FOR MIGRAINE RELIEF  MAY   REPEAT EVERY 2 HOURS  MAX 200MG/DAY; Therapy: 38DYL1741 to (Last Rx:17Oct2016) Ordered   7  TraMADol HCl - 50 MG Oral Tablet; Take one tablet every six hours as needed for pain; Therapy: 39SEQ6330 to (Last Rx:17Evx2520) Ordered   8  Venlafaxine HCl  MG Oral Capsule Extended Release 24 Hour; TAKE 2   CAPSULES DAILY  Requested for: 87ZSC5601; Last Rx:17Jan2017 Ordered   9  Zolpidem Tartrate 10 MG Oral Tablet; TAKE 1 TABLET AT BEDTIME AS NEEDED    Requested for: 95ZHN4369; Last Rx:07Nov2016 Ordered    Allergies    1  Lidoderm PTCH    2  No Known Environmental Allergies   3  No Known Food Allergies  Denied    4  Lidocream CREA    Vitals  Vital Signs    Recorded: 95SCB4805 10:45AM   Temperature 97 4 F   Heart Rate 85   Respiration 16   Systolic 288   Diastolic 82   Height 5 ft 7 in   Weight 214 lb 6 oz   BMI Calculated 33 58   BSA Calculated 2 08   O2 Saturation 98   Pain Scale 0     Physical Exam    Constitutional   General appearance: No acute distress, well appearing and well nourished  Eyes   Conjunctiva and lids: No swelling, erythema, or discharge  Pupils and irises: Equal, round and reactive to light  Ears, Nose, Mouth, and Throat   Oropharynx: Normal with no erythema, edema, exudate or lesions  Pulmonary   Respiratory effort: No increased work of breathing or signs of respiratory distress  Cardiovascular   Auscultation of heart: Normal rate and rhythm, normal S1 and S2, without murmurs      Examination of extremities for edema and/or varicosities: Normal     Abdomen   Abdomen: Non-tender, no masses  Liver and spleen: No hepatomegaly or splenomegaly  Lymphatic   Palpation of lymph nodes in neck: No lymphadenopathy  no axillary lymphadenopathy  Musculoskeletal   Gait and station: Normal     Skin   Skin and subcutaneous tissue: Normal without rashes or lesions  Neurologic   Cranial nerves: Cranial nerves 2-12 intact  Psychiatric   Orientation to person, place and time: Normal     Mood and affect: Normal         ECOG 0       Results/Data  Results Form:   Results   Radiology 3/1/17  Venous Doppler of bilateral lower extremities  Right lower extremity-negative  Left lower extremity-chronic 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 and paired peroneal veins at midcalf; no superficial thrombophlebitis; no new acute thrombosis  Lab Results 2/27/17  PT 10 4, INR 1 0  Lupus anticoagulant negative  WBC 5 1, RBC 4 88, hemoglobin 15 5, platelet 625  D-dimer less than 0 20  VAS LOWER LIMB VENOUS DUPLEX STUDY, COMPLETE BILATERAL 97FLY4670 01:49PM Garry Bustos Order Number: [id number]    - Patient Instructions: To schedule this appointment, please contact Central Scheduling at [phone]  Test Name Result Flag Reference   VAS LOWER LIMB VENOUS DUPLEX STUDY, COMPLETE BILATERAL (Report)     [facility] REPORT   CLINICAL:   Indications: Pulmonary Emboli [I26 99]  Pt  with a history of left DVT in the posterior tibial veins and peroneal   veins  Currently on eliquis  Had previous exams in [facility]    Clinical:         CONCLUSION:      Impression:   RIGHT LOWER LIMB:   No evidence of acute or chronic deep vein thrombosis  No evidence of superficial thrombophlebitis noted  Doppler evaluation shows a normal response to augmentation maneuvers  Popliteal, posterior tibial and anterior tibial arterial Doppler waveforms are   triphasic        LEFT LOWER LIMB: Evaluation shows chronic non-occlusive thrombus in one of the paired posterior   tibial veins and paired peroneal veins at the mid calf  No evidence of superficial thrombophlebitis noted  Doppler evaluation shows a normal response to augmentation maneuvers  Popliteal, posterior tibial and anterior tibial arterial Doppler waveforms are   triphasic        SIGNATURE:   Electronically Signed by: Angela Doe on 2017-03-01 04:04:10 PM     Future Appointments    Date/Time Provider Specialty Site   07/31/2017 04:45 PM Sachin Garcia DO Internal Medicine [address]   06/08/2017 02:30 PM Alexander Moreau MD Hematology Oncology [facility]     Signatures   Electronically signed by : Lainey James, [facility]; Mar 11 2017  3:50PM EST                       (Author)    Electronically signed by : Daniela Archer MD; Mar 12 2017  9:39PM EST                       (Author)    Electronically signed by : Daniela Archer MD; Mar 12 2017  9:39PM EST                       (Author)

## 2018-01-16 NOTE — MISCELLANEOUS
Message  Discussed with the insurance physician they denied the MRA scan of the brain        Signatures   Electronically signed by : Jesse Schmitt MD; Oct 19 2016  1:14PM EST                       (Author)

## 2018-01-17 NOTE — PROGRESS NOTES
Chief Complaint  Pt is here for a sleep study  Ordered by DR Rickey Peraza  Performed by Brannon Ward  Dx sleep disorder G47 9      Active Problems    1  Accelerated essential hypertension (401 0) (I10)   2  Acid reflux (530 81) (K21 9)   3  History of Active smoker (305 1) (Z72 0)   4  Acute intractable headache, unspecified headache type (784 0) (R51)   5  Cervicalgia (723 1) (M54 2)   6  Deep vein thrombosis (DVT) of left lower extremity (453 40) (I82 402)   7  Essential hypertension (401 9) (I10)   8  Extrinsic asthma (493 00) (J45 909)   9  Fatty liver (571 8) (K76 0)   10  Generalized anxiety disorder (300 02) (F41 1)   11  Headache (784 0) (R51)   12  Liver enlargement (789 1) (R16 0)   13  Other acute pulmonary embolism (415 19) (I26 99)   14  Sleep disorder (780 50) (G47 9)   15  Syncope (780 2) (R55)   16  Vitamin D deficiency (268 9) (E55 9)    Current Meds   1  ALPRAZolam 0 5 MG Oral Tablet; TAKE 1 TABLET  PRN; Therapy: (Recorded:17Oct2016) to Recorded   2  Gabapentin 100 MG Oral Capsule (Neurontin); TAKE 1 CAPSULE AT BEDTIME; Therapy: 84BHE7120 to (Last Rx:17Oct2016)  Requested for: 17Oct2016 Ordered   3  Magnesium Oxide 400 MG Oral Tablet; TAKE 1 TABLET DAILY; Therapy: (Recorded:12Oct2016) to Recorded   4  Riboflavin 100 MG TABS; TAKE 1 TABLET DAILY; Therapy: (Recorded:12Oct2016) to Recorded   5  SUMAtriptan Succinate 25 MG Oral Tablet; TAKE 1 TABLET FOR MIGRAINE RELIEF    MAY REPEAT EVERY 2 HOURS  MAX 200MG/DAY; Therapy: 94LDQ6989 to (Last Rx:17Oct2016) Ordered   6  TraMADol HCl - 50 MG Oral Tablet; Take one tablet every six hours as needed for pain; Therapy: 26TTH9286 to (Last Rx:17Oct2016) Ordered   7  Venlafaxine HCl  MG Oral Capsule Extended Release 24 Hour (Effexor XR);   TAKE 2 CAPSULES DAILY  Requested for: 91Xfe4543; Last Rx:42Zne6784 Ordered   8  Warfarin Sodium 2 5 MG Oral Tablet; take as directed; Therapy: (Recorded:17Oct2016) to Recorded   9   Zolpidem Tartrate 10 MG Oral Tablet (Ambien); TAKE 1 TABLET AT BEDTIME AS   NEEDED  Requested for: 08JGZ2713; Last Rx:15Rtq8368 Ordered    Allergies    1  Lidoderm PTCH    2  No Known Environmental Allergies   3  No Known Food Allergies  Denied    4  Lidocream CREA    Vitals  Signs    Systolic: 048, RUE, Sitting  Diastolic: 88, RUE, Sitting  Heart Rate: 92  Temperature: 98 1 F, Oral  O2 Saturation: 98, RA  Height: 5 ft 7 25 in  Weight: 214 lb 2 oz  BMI Calculated: 33 29  BSA Calculated: 2 09    Plan  Sleep disorder    · Sleep Study Unattended - Home; Status:Need Information - Financial Authorization;   Requested for:04Nmw6902;     Future Appointments    Date/Time Provider Specialty Site   03/03/2017 11:40 AM Willam Rodriguez MD Neurology [facility] [address] [place] [address]   11/07/2016 02:30 PM Nicolle Horn DO Internal Medicine [facility]   01/17/2017 05:45 PM Nicolle Horn DO Internal Medicine [facility]     Signatures   Electronically signed by : Vern Rucker, ; Oct 18 2016  1:24PM EST                       (Co-author)    Electronically signed by : Rachel Starkey DO; Oct 18 2016  6:44PM EST

## 2018-01-17 NOTE — MISCELLANEOUS
Message   Date: 16 Sep 2016 5:00 PM EST, Recorded By: Edson Swift For: Thomas Mondragon: Bhavya Breaux, Self   Phone: [phone] (Home)   Patient called the office requesting the results of the CT of the head done on 9/13/16  I informed him that the findings were negative for bleeding and masses  He had no questions at the time of the call  Active Problems    1  Accelerated essential hypertension (401 0) (I10)   2  Acid reflux (530 81) (K21 9)   3  History of Active smoker (305 1) (Z72 0)   4  Acute intractable headache, unspecified headache type (784 0) (R51)   5  Deep vein thrombosis (DVT) of left lower extremity (453 40) (I82 402)   6  Essential hypertension (401 9) (I10)   7  Extrinsic asthma (493 00) (J45 909)   8  Fatty liver (571 8) (K76 0)   9  Generalized anxiety disorder (300 02) (F41 1)   10  Liver enlargement (789 1) (R16 0)   11  Other acute pulmonary embolism (415 19) (I26 99)   12  Vitamin D deficiency (268 9) (E55 9)    Current Meds   1  ALPRAZolam 0 5 MG Oral Tablet (Xanax); take 1 tablet daily prn; Last Rx:16Jun2016   Ordered   2  Butalbital-APAP-Caffeine -40 MG Oral Capsule; TAKE 1 TABLET EVERY 8   HOURS AS NEEDED; Therapy: 24GJB5244 to (Last Rx:23Jcs5670)  Requested for: 23Wcn6209; Status:   ACTIVE - Retrospective Authorization Ordered   3  Fluticasone Propionate 50 MCG/ACT Nasal Suspension; USE AS DIRECTED; Therapy: 21Jan2013 to ([place] Acrthelma)  Requested for: 36PCF8456; Last   Rx:90Mnu5804 Ordered   4  Omeprazole 20 MG Oral Capsule Delayed Release; TAKE 1 CAPSULE DAILY; Therapy: 18MLC8101 to (Evaluate:06Jun2016)  Requested for: 36KQF3499; Last   Rx:08Mar2016 Ordered   5  Venlafaxine HCl  MG Oral Capsule Extended Release 24 Hour (Effexor XR);   TAKE 2 CAPSULES DAILY  Requested for: 42Ybx1600; Last Rx:74Orl9564 Ordered   6   Warfarin Sodium 2 5 MG Oral Tablet (Coumadin); TAKE 1 TABLET DAILY AS DIRECTED    Requested for: 46FUX1563; Last Rx:08Jun2016 Ordered   7  Zolpidem Tartrate 10 MG Oral Tablet (Ambien); TAKE 1 TABLET AT BEDTIME AS   NEEDED  Requested for: 89JMU2560; Last Rx:06Sep2016 Ordered    Allergies    1  Lidoderm PTCH   2  Lidocream CREA    3  No Known Environmental Allergies   4   No Known Food Allergies    Signatures   Electronically signed by : Kay Chin RN; Sep 20 2016 10:24AM EST                       (Author)

## 2018-01-17 NOTE — RESULT NOTES
Verified Results  (1) LUPUS ANTICOAGULANT PROFILE 80AYI9147 12:54PM Scotty Wolf    Order Number: [id number]     Test Name Result Flag Reference   DRVVT MIX INTERPRETATION 47 8 sec H 0 0 - 44 0   Performing Comments: please include protein c,s, factor 5 leiden  Performed at:  [address]  [id number]  : Bard Do TOBIN, Phone:  [phone]   DRVVT/CONFIRM RATIO 1 3 ratio H 0 8 - 1 2   Performing Comments: please include protein c,s, factor 5 leiden  Performed at:  [address]  [id number]  : Bard Do TOBIN, Phone:  [phone]   PTT-LA MIX 41 2 sec H 0 0 - 40 6   Performing Comments: please include protein c,s, factor 5 leiden  Performed at:  [address]  [id number]  : Bard Do TOBIN, Phone:  [phone]   HEXAGONAL PHOSPHOLIPID NEUTRALIZAT TEST 11 sec  0 - 11   Performing Comments: please include protein c,s, factor 5 leiden  Performed at:  [address]  [id number]  : Bard Do TOBIN, Phone:  [phone]     (1) THROMBOSIS PANEL 30HZC1051 12:54PM Frank Dempsey Order Number: [id number]     Test Name Result Flag Reference   ANTI-THROM  % of Normal     Performing Comments: please include protein c,s, factor 5 leiden  Performing Comments: please include protein c,s, factor 5 leiden     CARDLIP IGA AB <9 APL U/mL  0 - 11   Negative:              <12                            Indeterminate:     12 - 20                            Low-Med Positive: >20 - 80                            High Positive:         >80   CARDLIP IGG AB <9 GPL U/mL  0 - 14   Negative:              <15                            Indeterminate:     15 - 20                            Low-Med Positive: >20 - 80                            High Positive: >80   CARDLIP IGM AB <9 MPL U/mL  0 - 12   Negative:              <13                            Indeterminate:     13 - 20                            Low-Med Positive: >20 - 80                            High Positive:         >80  Performing Comments: please include protein c,s, factor 5 leiden  Performed at:  [facility] [place] [address]  [id number]  : John Squires MD, Phone:  [phone]   PROTEIN C 132 % of Normal     Performing Comments: please include protein c,s, factor 5 leiden  Performing Comments: please include protein c,s, factor 5 leiden  PROTEIN S ACTIVITY 164 % H 63 - 140   An elevated protein S activity is of no known clinical significance  Protein S activity may be falsely increased (masking an abnormal, low  result) in patients receiving direct Xa inhibitor (e g , rivaroxaban,  apixaban, edoxaban) or a direct thrombin inhibitor (e g , dabigatran)  anticoagulant treatment due to assay interference by these drugs  Performing Comments: please include protein c,s, factor 5 leiden  Performed at:  [address]  [id number]  : Rin Long MD, Phone:  [phone]   [address] [place] [address] <9 GPI IgG units  0 - 20   The reference interval reflects a 3SD or 99th percentile interval,  which is thought to represent a potentially clinically significant  result in accordance with the International Consensus Statement on  the classification criteria for definitive antiphospholipid syndrome  (APS)  J Thromb Haem 2006;4:295-306  BETA-2 GLYCOPROTEIN I AB,IGM <9 GPI IgM units  0 - 32   The reference interval reflects a 3SD or 99th percentile interval,  which is thought to represent a potentially clinically significant  result in accordance with the International Consensus Statement on  the classification criteria for definitive antiphospholipid syndrome  (APS)   J Thromb Haem 2006;4:295-306  Performing Comments: please include protein c,s, factor 5 leiden  Performed at:  [address]  [id number]  : Jossie Schaffer MD, Phone:  [phone]   Wiliam Pratt 89 <9 GPI IgA units  0 - 25   The reference interval reflects a 3SD or 99th percentile interval,  which is thought to represent a potentially clinically significant  result in accordance with the International Consensus Statement on  the classification criteria for definitive antiphospholipid syndrome  (APS)  J Thromb Haem 2006;4:295-306  (1) THROMBOSIS PANEL 55JBW8264 12:54PM [facility] Order Number: [id number]     Test Name Result Flag Reference   ANTI-THROM  % of Normal     Performing Comments: please include protein c,s, factor 5 leiden  Performing Comments: please include protein c,s, factor 5 leiden  CARDLIP IGA AB <9 APL U/mL  0 - 11   Negative:              <12                            Indeterminate:     12 - 20                            Low-Med Positive: >20 - 80                            High Positive:         >80   CARDLIP IGG AB <9 GPL U/mL  0 - 14   Negative:              <15                            Indeterminate:     15 - 20                            Low-Med Positive: >20 - 80                            High Positive:         >80   CARDLIP IGM AB <9 MPL U/mL  0 - 12   Negative:              <13                            Indeterminate:     13 - 20                            Low-Med Positive: >20 - 80                            High Positive:         >80  Performing Comments: please include protein c,s, factor 5 leiden        Performed at:  "[employer] [address]  [id number]  : Luba Sorto MD, Phone:  [phone]   DILUTE PROTHROMBIN TIME(DPT) 63 1 sec H 0 0 - 55 0   DPT CONFIRM RATIO 0 98 Ratio  0 00 - 1 40   DILUTE STEPHANY VIPER VENOM TIME 69 1 sec H 0 0 - 44 0 PROTEIN C 132 % of Normal     Performing Comments: please include protein c,s, factor 5 leiden  Performing Comments: please include protein c,s, factor 5 leiden  PROTEIN S ACTIVITY 164 % H 63 - 140   An elevated protein S activity is of no known clinical significance  Protein S activity may be falsely increased (masking an abnormal, low  result) in patients receiving direct Xa inhibitor (e g , rivaroxaban,  apixaban, edoxaban) or a direct thrombin inhibitor (e g , dabigatran)  anticoagulant treatment due to assay interference by these drugs  Performing Comments: please include protein c,s, factor 5 leiden  Performed at:  [address]  [id number]  : Jeff Branch MD, Phone:  [phone]   [address] [place] [address] Blvd <9 GPI IgG units  0 - 20   The reference interval reflects a 3SD or 99th percentile interval,  which is thought to represent a potentially clinically significant  result in accordance with the International Consensus Statement on  the classification criteria for definitive antiphospholipid syndrome  (APS)  J Thromb Haem 2006;4:295-306  BETA-2 GLYCOPROTEIN I AB,IGM <9 GPI IgM units  0 - 32   The reference interval reflects a 3SD or 99th percentile interval,  which is thought to represent a potentially clinically significant  result in accordance with the International Consensus Statement on  the classification criteria for definitive antiphospholipid syndrome  (APS)  J Thromb Haem 2006;4:295-306  Performing Comments: please include protein c,s, factor 5 leiden        Performed at:  [address]  [id number]  : Jeff Branch MD, Phone:  [phone]   [address] [place] [address] Blvd <9 GPI IgA units  0 - 25   The reference interval reflects a 3SD or 99th percentile interval,  which is thought to represent a potentially clinically significant  result in accordance with the International Consensus Statement on  the classification criteria for definitive antiphospholipid syndrome  (APS)  J Thromb Haem 2006;4:295-306  PTT LUPUS ANTICOAGULANT 45 0 sec H 0 0 - 40 6   THROMBIN TIME (DRVW) 16 6 sec  0 0 - 20 9   LUPUS REFLEX INTERPRETATION Comment:     Results are consistent with the presence of a lupus anticoagulant  NOTE: Only persistent lupus anticoagulants are thought to be of  clinical significance  For this reason, repeat testing in 12 or more  weeks after an initial positive result should be considered to  confirm or refute the presence of a lupus anticoagulant,  depending on clinical presentation  Performing Comments: please include protein c,s, factor 5 leiden        Performed at:  [address]  [id number]  : Jasson Lerma MD, Phone:  [phone]

## 2018-01-17 NOTE — MISCELLANEOUS
Assessment    1  Essential hypertension (401 9) (I10)   2  Headache (784 0) (R51)   3  Generalized anxiety disorder (300 02) (F41 1)   4  Syncope (780 2) (R55)   5  Cervicalgia (723 1) (M54 2)   6  Deep vein thrombosis (DVT) of left lower extremity (453 40) (I82 402)    Plan  Acute intractable headache, unspecified headache type    · Butalbital-APAP-Caffeine -40 MG Oral Capsule   Dispense: 0 Days ; #: Sufficient Capsule; Refill: 0; For: Acute intractable headache, unspecified headache type; NASH = N; Record; Last Updated By: Alexi Baeza; 10/13/2016 2:56:05 PM  Cervicalgia    · SUMAtriptan Succinate 25 MG Oral Tablet; TAKE 1 TABLET FOR MIGRAINE RELIEF   MAY REPEAT EVERY 2 HOURS  MAX 200MG/DAY   Rx By: Alexi Baeza; Dispense: 0 Days ; #:9 Tablet; Refill: 1; For: Cervicalgia; NASH = N; Print Rx   · TraMADol HCl - 50 MG Oral Tablet; Take one tablet every six hours as needed for  pain   Rx By: Alexi Baeza; Dispense: 0 Days ; #:40 Tablet; Refill: 1; For: Cervicalgia; NASH = N; Print Rx   · * XR SPINE CERVICAL 2 OR 3 VW INJURY; Status:Active; Requested [id number];    Perform: [facility]; Due:2017;Ordered; For:Cervicalgia; Ordered By:Hollie Jarvis;   · Follow-up visit in 3 weeks Evaluation and Treatment  Follow-up  Status: Hold For -  Scheduling  Requested for: [id number]   Ordered; For: Cervicalgia; Ordered By: Alexi Baeza Performed:  Due: 13ZGI9614  Deep vein thrombosis (DVT) of left lower extremity    · VAS LOWER LIMB VENOUS DUPLEX STUDY, UNILATERAL/LIMITED; Unilateral Side:Left;  Status:Hold For - Scheduling; Requested UKA:[id number];    Perform:[facility]; Order Comments:compare to 2016 and oct 2015; Due:2017;Ordered; For:Deep vein thrombosis (DVT) of left lower extremity; Ordered By:Hollie Jarvis;    Discussion/Summary  Discussion Summary:   Hospital records reviewed, neuro note seen, EEG negative, Echo is normal  check xray of neck    check LLE doppler for the chronic left calf vein clot  discussed chaging to eliquis due to possible side effects of couamdin but we will check the US first    Counseling Documentation With Imm: The patient was counseled regarding diagnostic results  Chief Complaint  Chief Complaint Free Text Note Form: Pt, here for CHARLES apt,   Pt, was in [facility] admitted on 10/8/2016 and left on 10/11/2016  pt, was in for altered mental status  Pt, stated feeling ok  History of Present Illness  TCM Communication [facility]: The patient is being contacted for follow-up after hospitalization and CHARLES scheduled 10/17/16 @ 3:30 PM w/FM in [facility]  He was [facility] at [facility]  The date of admission: 10/8/16, date of discharge: 10/11/16  Diagnosis: altered mental status, headache, hx PE, acid reflux, hx active smoker, DVT of LLE, extrinsic asthma, fatty liver, generalized anxiety disorder, liver enlargement, VitaD deficiency  He was discharged to home  Medications reviewed and updated today  He scheduled a follow up appointment  Follow-up appointments with other specialists: was told to follow up with neuro  Symptoms: headache  Counseling was provided to the patient  Communication performed and completed by khushi   Headache (Follow-Up): The patient is being seen for follow-up of headache  The patient reports doing poorly  Interval Events: no change in vision, no new meds, no stress or trauma, uses couamdin, always with a HA 24/7, tylenol not helping, never had them in the past  no trauma  no recent illneses  no OTC meds  10/17/16: reviewed records, fiorcet not helpng now  saw neuro  has had 2 HA since d/c   He has had no significant interval events  Interval symptoms:  worsened headache, denies nausea, denies vomiting, denies photophobia, denies phonophobia, denies paresthesias, denies localized weakness, denies aura and denies scotoma  Associated symptoms: no nasal congestion, no facial pain, no neck pain, no vertigo and no lightheadedness   The patient is not currently on medication for this problem  Disease management:  the patient is not doing well with his goals  Generalized Anxiety Disorder (Brief): The patient is being seen for a routine clinic follow-up of anxiety disorder  Symptoms:  no palpitations, no chest discomfort, no trouble breathing, no trembling, no paresthesias, no lightheadedness, no nausea, no abdominal discomfort, no excessive sweating, no hot flashes, no racing thoughts, no excessive worrying, no fear of dying, no fear of losing control, no flashbacks, no repetitive behaviors and no sleep disturbance  The patient is currently asymptomatic  Associated symptoms:  no poor concentration, no memory impairment, no avoidance of physical exertion, no irritability, no agitation, no hostility and no depression symptoms  Suicidal risk:  no suicidal thoughts  Homicidal risk:  no homicidal thoughts  Current treatment includes selective serotonin-norepinephrine reuptake inhibitors and benzodiazepines  There are no medication side effects  Syncope (Brief): The patient is being seen for follow-up of a hospitalization for syncope  The syncopal episode occurred while the patient was lying down  The fainting episode followed headache, was on the filed at a game  (EEG, CT scan x 2 of the had were normal  saw neuro)      Review of Systems  Complete-Male:   Constitutional: recent 5 lb weight gain, but no fever, not feeling poorly, no chills, not feeling tired and no recent weight loss  Eyes: no eyesight problems, no dryness of the eyes, no purulent discharge from the eyes and no itching of the eyes  ENT: no sore throat  Cardiovascular: no chest pain and no palpitations  Respiratory: shortness of breath during exertion, but as noted in HPI, no shortness of breath, no cough, no orthopnea and no wheezing  Gastrointestinal: no abdominal pain, no nausea, no vomiting and no diarrhea  Genitourinary: no dysuria, no urinary hesitancy and no incontinence  Musculoskeletal: as noted in HPI, no arthralgias, no joint swelling, no myalgias and no joint stiffness  Integumentary: no rashes and no skin lesions  Neurological: headache, but no numbness, no tingling, no dizziness and no fainting  Psychiatric: sleep disturbances and sleeps well with ambien, if not he is restless  , but not suicidal, no anxiety and no depression  Hematologic/Lymphatic: no swollen glands and no tendency for easy bleeding  Active Problems    1  Accelerated essential hypertension (401 0) (I10)   2  Acid reflux (530 81) (K21 9)   3  History of Active smoker (305 1) (Z72 0)   4  Acute intractable headache, unspecified headache type (784 0) (R51)   5  Deep vein thrombosis (DVT) of left lower extremity (453 40) (I82 402)   6  Essential hypertension (401 9) (I10)   7  Extrinsic asthma (493 00) (J45 909)   8  Fatty liver (571 8) (K76 0)   9  Generalized anxiety disorder (300 02) (F41 1)   10  Liver enlargement (789 1) (R16 0)   11  Other acute pulmonary embolism (415 19) (I26 99)   12  Vitamin D deficiency (268 9) (E55 9)    Past Medical History    1  History of Abdominal pain (789 00) (R10 9)   2  Denied: History of Carrier Of STD   3  History of Colitis (558 9) (K52 9)   4  History of Elevated ALT measurement (790 4) (R74 0)   5  History of Encounter for male sterilization procedure (V25 2) (Z30 2)   6  History of Encounter for sterilization (V25 2) (Z30 2)   7  History of Knee pain (719 46) (M25 569)   8  History of Meniscus tear (836 2) (S83 209A)   9  Denied: History of Mental Status Change   10  History of Nonvenomous Insect Bite (E906 4)   11  History of Viral URI (465 9) (J06 9,B97 89)    Surgical History     1  History of Hernia Repair   2  History of Surgery Vas Deferens Vasectomy    History of Knee Arthroscopy          Family History  Mother    1  Family history of Type 2 Diabetes Mellitus  Father    2  Family history of Type 2 Diabetes Mellitus  Brother    3   Family history of Fatty Liver  Paternal Grandmother    4  Family history of Type 2 Diabetes Mellitus  Maternal Grandfather    5  Family history of Cancer   6  Family history of Family Health Status 3  Children Living   7  Family history of Travel  Maternal Aunt    8  Family history of Cancer    Social History    · History of Active smoker (305 1) (Z72 0)   · Alcohol Use (History)   · Caffeine Use   · Chewing Tobacco Fine Cut   · Former smoker (H63 47) (Z87 891)   · Marital History - Currently    · Never A Smoker   · Never Used Drugs   · Occupation:    Current Meds   1  ALPRAZolam 0 5 MG Oral Tablet; take 1 tablet daily prn; Last Rx:16Jun2016 Ordered   2  ALPRAZolam 0 5 MG Oral Tablet; TAKE 1 TABLET Twice daily PRN; Therapy: (Recorded:12Oct2016) to Recorded   3  Butalbital-APAP-Caffeine -40 MG Oral Capsule; TAKE 1 CAPSULE Every 6 hours   PRN; Therapy: (Recorded:12Oct2016) to Recorded   4  Coumadin 5 MG Oral Tablet; TAKE TABLET  Monday and Friday; Therapy: (Recorded:12Oct2016) to Recorded   5  Fluticasone Propionate 50 MCG/ACT Nasal Suspension; USE AS DIRECTED; Therapy: 21Jan2013 to (Cipriano Level)  Requested for: 74WJT2199; Last   Rx:75Pil1538 Ordered   6  Magnesium Oxide 400 MG Oral Tablet; TAKE 1 TABLET DAILY; Therapy: (Recorded:12Oct2016) to Recorded   7  Omeprazole 20 MG Oral Capsule Delayed Release; TAKE 1 CAPSULE DAILY; Therapy: 33AHB8108 to (Evaluate:06Jun2016)  Requested for: 56ZRN1453; Last   Rx:08Mar2016 Ordered   8  Riboflavin 100 MG TABS; TAKE 1 TABLET DAILY; Therapy: (Recorded:12Oct2016) to Recorded   9  Venlafaxine HCl  MG Oral Capsule Extended Release 24 Hour; TAKE 2   CAPSULES DAILY  Requested for: 52Oby1341; Last Rx:57Bah5931 Ordered   10  Warfarin Sodium 2 5 MG Oral Tablet; TAKE 1 TABLET DAILY AS DIRECTED  Requested    for: 96OTC9991; Last Rx:08Jun2016 Ordered   11   Zolpidem Tartrate 10 MG Oral Tablet; TAKE 1 TABLET AT BEDTIME AS NEEDED     Requested for: 55LYC2376; Last NN:03MVN6160 Ordered  Medication List Reviewed: The medication list was reviewed and updated today  Allergies     1  No Known Environmental Allergies   2  No Known Food Allergies    Lidocream CREA : Allergy     Annotations              all forms  Lidoderm PTCH : Anaphylaxis; ;      Vitals  Signs   Recorded: 18VYQ5109 53:82XA   Systolic: 906, LUE, Sitting  Diastolic: 90, LUE, Sitting  Heart Rate: 100  Temperature: 98 7 F, Oral  O2 Saturation: 99, RA  Height: 5 ft 7 32 in  Weight: 219 lb 4 oz  BMI Calculated: 34 02  BSA Calculated: 2 11    Physical Exam    Constitutional   General appearance: No acute distress, well appearing and well nourished  Eyes   Conjunctiva and lids: No swelling, erythema, or discharge  Pupils and irises: Equal, round and reactive to light  Ears, Nose, Mouth, and Throat   External inspection of ears and nose: Normal     Oropharynx: Normal with no erythema, edema, exudate or lesions  Pulmonary   Respiratory effort: No increased work of breathing or signs of respiratory distress  Auscultation of lungs: Clear to auscultation, equal breath sounds bilaterally, no wheezes, no rales, no rhonci  Cardiovascular   Auscultation of heart: Normal rate and rhythm, normal S1 and S2, without murmurs  Examination of extremities for edema and/or varicosities: Normal     Carotid pulses: Normal     Abdomen   Abdomen: Non-tender, no masses  Liver and spleen: No hepatomegaly or splenomegaly  Lymphatic   Palpation of lymph nodes in neck: No lymphadenopathy  Musculoskeletal   Digits and nails: Normal without clubbing or cyanosis  hypertonicity b/l trapezious, no bony pain  Skin   Skin and subcutaneous tissue: Normal without rashes or lesions  Knee surgery scar     Psychiatric   Orientation to person, place and time: Normal     Mood and affect: Normal          Future Appointments    Date/Time Provider Specialty Site   03/03/2017 11:40 AM Aby Perdomo MD [facility]rology [facility] [place]     Signatures   Electronically signed by : Malathi Trevino, ; Oct 12 2016 11:04AM EST                       (Co-author)    Electronically signed by : Bryan Jones DO; Oct 17 2016  4:12PM EST                       (Author)

## 2018-01-18 NOTE — RESULT NOTES
Verified Results  (1) CBC/ PLT (NO DIFF) [id number] 03:58PM [email or web address]     Test Name Result Flag Reference   HEMATOCRIT 42 1 %  36 5-49 3   HEMOGLOBIN 14 8 g/dL  12 0-17 0   MCHC 35 2 g/dL  31 4-37 4   MCH 32 0 pg  26 8-34 3   MCV 91 fL  82-98   PLATELET COUNT 739 Thousands/uL  149-390   RBC COUNT 4 62 Million/uL  3 88-5 62   RDW 13 1 %  11 6-15 1   WBC COUNT 4 48 Thousand/uL  4 31-10 16   MPV 10 7 fL  8 9-12 7     (1) COMPREHENSIVE METABOLIC PANEL 94ABB3489 41:42OE Heather Jarvis 19 Kidney Disease Education Program recommendations are as follows:  GFR calculation is accurate only with a steady state creatinine  Chronic Kidney disease less than 60 ml/min/1 73 sq  meters  Kidney failure less than 15 ml/min/1 73 sq  meters  Test Name Result Flag Reference   GLUCOSE,RANDM 115 mg/dL     SODIUM 139 mmol/L  136-145   POTASSIUM 3 6 mmol/L  3 5-5 3   CHLORIDE 105 mmol/L  100-108   CARBON DIOXIDE 26 mmol/L  21-32   ANION GAP (CALC) 8 mmol/L  4-13   BLOOD UREA NITROGEN 15 mg/dL  5-25   CREATININE 0 99 mg/dL  0 60-1 30   Standardized to IDMS reference method   CALCIUM 8 8 mg/dL  8 3-10 1   BILI, TOTAL 0 71 mg/dL  0 20-1 00   ALK PHOSPHATAS 82 U/L     ALT (SGPT) 82 U/L H 12-78   AST(SGOT) 40 U/L  5-45   ALBUMIN 4 2 g/dL  3 5-5 0   TOTAL PROTEIN 7 3 g/dL  6 4-8 2   eGFR Non-African American      >60 0 ml/min/1 73sq m     (1) TSH [id number] 03:58PM [facility]   Patients undergoing fluorescein dye angiography may retain small amounts of fluorescein in the body for 48-72 hours post procedure  Samples containing fluorescein can produce falsely depressed TSH values  If the patient had this procedure,a specimen should be resubmitted post fluorescein clearance       Test Name Result Flag Reference   TSH 0 661 uIU/mL  0 358-3 740     (1) VITAMIN D 25-HYDROXY [id number] 03:58PM [email or web address]     Test Name Result Flag Reference   VIT D 25-HYDROX 25 9 ng/mL L 30 0-100 0

## 2018-01-22 VITALS
RESPIRATION RATE: 16 BRPM | DIASTOLIC BLOOD PRESSURE: 82 MMHG | TEMPERATURE: 97.4 F | SYSTOLIC BLOOD PRESSURE: 136 MMHG | BODY MASS INDEX: 33.65 KG/M2 | WEIGHT: 214.38 LBS | HEART RATE: 85 BPM | HEIGHT: 67 IN | OXYGEN SATURATION: 98 %

## 2018-01-22 VITALS
TEMPERATURE: 99.4 F | SYSTOLIC BLOOD PRESSURE: 124 MMHG | DIASTOLIC BLOOD PRESSURE: 80 MMHG | WEIGHT: 217 LBS | HEIGHT: 68 IN | OXYGEN SATURATION: 98 % | BODY MASS INDEX: 32.89 KG/M2 | HEART RATE: 107 BPM

## 2018-01-22 VITALS
HEART RATE: 98 BPM | BODY MASS INDEX: 32.28 KG/M2 | SYSTOLIC BLOOD PRESSURE: 118 MMHG | WEIGHT: 213 LBS | OXYGEN SATURATION: 98 % | DIASTOLIC BLOOD PRESSURE: 86 MMHG | HEIGHT: 68 IN | TEMPERATURE: 97.9 F

## 2018-01-23 VITALS
TEMPERATURE: 97.9 F | HEIGHT: 68 IN | HEART RATE: 103 BPM | SYSTOLIC BLOOD PRESSURE: 124 MMHG | DIASTOLIC BLOOD PRESSURE: 88 MMHG | OXYGEN SATURATION: 98 % | WEIGHT: 210.25 LBS | BODY MASS INDEX: 31.87 KG/M2

## 2018-01-23 VITALS
SYSTOLIC BLOOD PRESSURE: 128 MMHG | HEART RATE: 92 BPM | DIASTOLIC BLOOD PRESSURE: 90 MMHG | BODY MASS INDEX: 32.75 KG/M2 | HEIGHT: 68 IN | TEMPERATURE: 97.1 F | WEIGHT: 216.13 LBS | OXYGEN SATURATION: 99 %

## 2018-01-23 NOTE — MISCELLANEOUS
Message  Return to work or school:   Clint Roberson is under my professional care  He was seen in my office on 12/14/2017   He is able to return to work on  12/18/2017       Renne Dance PA-C        Signatures   Electronically signed by : Renne Dance, [facility]; Dec 14 2017  2:15PM EST                       (Author)

## 2018-01-23 NOTE — MISCELLANEOUS
Message  Return to work or school:   Nydia Quiñonez is under my professional care  He was seen in my office on 12/8/2017   He is able to return to work on  12/11/2017            Signatures   Electronically signed by : Shagufta Rooney, [facility];  Dec  8 2017  5:01PM EST                       (Author)

## 2018-01-25 ENCOUNTER — OFFICE VISIT (OUTPATIENT)
Dept: INTERNAL MEDICINE CLINIC | Facility: CLINIC | Age: 43
End: 2018-01-25
Payer: COMMERCIAL

## 2018-01-25 VITALS
HEIGHT: 68 IN | HEART RATE: 82 BPM | WEIGHT: 214.6 LBS | TEMPERATURE: 98.2 F | DIASTOLIC BLOOD PRESSURE: 74 MMHG | BODY MASS INDEX: 32.52 KG/M2 | SYSTOLIC BLOOD PRESSURE: 122 MMHG | OXYGEN SATURATION: 98 %

## 2018-01-25 DIAGNOSIS — M54.6 ACUTE MIDLINE THORACIC BACK PAIN: Primary | ICD-10-CM

## 2018-01-25 DIAGNOSIS — M25.511 ACUTE PAIN OF RIGHT SHOULDER: ICD-10-CM

## 2018-01-25 PROBLEM — R76.0 LUPUS ANTICOAGULANT POSITIVE: Status: ACTIVE | Noted: 2017-01-17

## 2018-01-25 PROBLEM — G47.33 OSA (OBSTRUCTIVE SLEEP APNEA): Status: ACTIVE | Noted: 2018-01-25

## 2018-01-25 PROBLEM — F51.04 CHRONIC INSOMNIA: Status: ACTIVE | Noted: 2017-09-29

## 2018-01-25 PROBLEM — G43.109 CLASSIC MIGRAINE: Status: ACTIVE | Noted: 2017-09-25

## 2018-01-25 PROBLEM — I26.99 PULMONARY EMBOLISM (HCC): Status: ACTIVE | Noted: 2017-02-23

## 2018-01-25 PROCEDURE — 99213 OFFICE O/P EST LOW 20 MIN: CPT | Performed by: NURSE PRACTITIONER

## 2018-01-25 RX ORDER — VENLAFAXINE HYDROCHLORIDE 150 MG/1
2 CAPSULE, EXTENDED RELEASE ORAL DAILY
COMMUNITY
End: 2018-05-15 | Stop reason: SDUPTHER

## 2018-01-25 RX ORDER — TRAMADOL HYDROCHLORIDE 50 MG/1
1 TABLET ORAL EVERY 6 HOURS PRN
COMMUNITY
Start: 2016-10-17 | End: 2018-04-13 | Stop reason: SDUPTHER

## 2018-01-25 RX ORDER — ZOLPIDEM TARTRATE 10 MG/1
1 TABLET ORAL
COMMUNITY
Start: 2017-10-05 | End: 2018-01-25 | Stop reason: SDUPTHER

## 2018-01-25 RX ORDER — ALBUTEROL SULFATE 90 UG/1
2 AEROSOL, METERED RESPIRATORY (INHALATION)
COMMUNITY
Start: 2018-01-02 | End: 2019-04-15 | Stop reason: ALTCHOICE

## 2018-01-25 RX ORDER — FLUTICASONE PROPIONATE 50 MCG
2 SPRAY, SUSPENSION (ML) NASAL DAILY
COMMUNITY
Start: 2017-11-30 | End: 2018-09-04 | Stop reason: SDUPTHER

## 2018-01-25 RX ORDER — SUMATRIPTAN 25 MG/1
1 TABLET, FILM COATED ORAL AS NEEDED
COMMUNITY
Start: 2016-10-17 | End: 2019-12-17 | Stop reason: SDUPTHER

## 2018-01-25 RX ORDER — ALPRAZOLAM 0.5 MG/1
1 TABLET ORAL
COMMUNITY
End: 2018-05-15 | Stop reason: SDUPTHER

## 2018-01-25 NOTE — PATIENT INSTRUCTIONS
Back Pain   AMBULATORY CARE:   Back pain  is common  You may feel sore or stiff on one or both sides of your back  The pain may spread to your buttocks or thighs  Back pain may be caused by an injury, lack of exercise, or obesity  Repeated bending, lifting, twisting, or lifting heavy items can also cause back pain  Seek immediate care for the following symptoms:   · Pain, numbness, or weakness in one or both legs    · Pain that is so severe, you cannot walk    · Unable to control your urine or bowel movements    · Severe back pain with chest pain    · Severe back pain, nausea, and vomiting    · Severe back pain that spreads to your side or genital area  Contact your healthcare provider if:   · You have back pain that does not get better with rest and pain medicine  · You have a fever  · You have pain that worsens when you are on your back or when you rest     · You have pain that worsens when you cough or sneeze  · You lose weight without trying  · You have questions or concerns about your condition or care  Treatment for back pain  may include any of the following:  · NSAIDs , such as ibuprofen, help decrease swelling, pain, and fever  This medicine is available with or without a doctor's order  NSAIDs can cause stomach bleeding or kidney problems in certain people  If you take blood thinner medicine, always ask your healthcare provider if NSAIDs are safe for you  Always read the medicine label and follow directions  · Acetaminophen  decreases pain  It is available without a doctor's order  Ask how much to take and how often to take it  Follow directions  Acetaminophen can cause liver damage if not taken correctly  · Prescription pain medicine  may be given  Ask your healthcare provider how to take this medicine safely  Manage your back pain:   · Apply ice  on your back for 15 to 20 minutes every hour or as directed  Use an ice pack, or put crushed ice in a plastic bag  Cover it with a towel  Ice helps decrease swelling and pain  · Apply heat  on your back for 20 to 30 minutes every 2 hours for as many days as directed  Heat helps decrease pain and muscle spasms  You can alternate ice and heat  · Stay active  as much as you can without causing more pain  Bed rest could make your back pain worse  Avoid heavy lifting until your pain is gone  Follow up with your healthcare provider as directed:  Write down your questions so you remember to ask them during your visits  © 2017 [employer]  Information is for End User's use only and may not be sold, redistributed or otherwise used for commercial purposes  All illustrations and images included in CareNotes® are the copyrighted property of A D A M , Inc  or [employer]  The above information is an  only  It is not intended as medical advice for individual conditions or treatments  Talk to your doctor, nurse or pharmacist before following any medical regimen to see if it is safe and effective for you  Shoulder Pain   AMBULATORY CARE:   Shoulder pain  is a common problem and can affect your daily activities  Pain can be caused by a problem within your shoulder  Shoulder pain may also be caused by pain that spreads to your shoulder from another part of your body  Seek care immediately if:   · You have severe pain  · You cannot move your arm or shoulder  · You have numbness or tingling in your shoulder or arm  Contact your healthcare provider if:   · Your pain gets worse or does not go away with treatment  · You have trouble moving your arm or shoulder  · You have questions or concerns about your condition or care  Treatment for shoulder pain  may include any of the following:  · Acetaminophen  decreases pain and fever  It is available without a doctor's order  Ask how much to take and how often to take it  Follow directions   Acetaminophen can cause liver damage if not taken correctly  · NSAIDs , such as ibuprofen, help decrease swelling, pain, and fever  This medicine is available with or without a doctor's order  NSAIDs can cause stomach bleeding or kidney problems in certain people  If you take blood thinner medicine, always ask your healthcare provider if NSAIDs are safe for you  Always read the medicine label and follow directions  · A steroid injection  may help decrease pain and swelling  · Surgery  may be needed for long-term pain and loss of function  Manage your symptoms:   · Apply ice  on your shoulder for 20 to 30 minutes every 2 hours or as directed  Use an ice pack, or put crushed ice in a plastic bag  Cover it with a towel  Ice helps prevent tissue damage and decreases swelling and pain  · Apply heat if ice does not help your symptoms  Apply heat on your shoulder for 20 to 30 minutes every 2 hours for as many days as directed  Heat helps decrease pain and muscle spasms  · Go to physical or occupational therapy as directed  A physical therapist teaches you exercises to help improve movement and strength, and to decrease pain  An occupational therapist teaches you skills to help with your daily activities  Prevent shoulder pain:   · Stretch and strengthen your shoulder  Use proper technique during exercises and sports  · Limit activities as directed  Try to avoid repeated overhead movements  Follow up with your healthcare provider or orthopedist as directed:  Write down your questions so you remember to ask them during your visits  © 2017 [employer] Information is for End User's use only and may not be sold, redistributed or otherwise used for commercial purposes  All illustrations and images included in CareNotes® are the copyrighted property of A D A M , Inc  or [employer]  The above information is an  only  It is not intended as medical advice for individual conditions or treatments   Talk to your doctor, nurse or pharmacist before following any medical regimen to see if it is safe and effective for you

## 2018-01-25 NOTE — PROGRESS NOTES
Assessment/Plan:    No problem-specific Assessment & Plan notes found for this encounter  Diagnoses and all orders for this visit:    Acute midline thoracic back pain  -     XR spine thoracic 3 vw; Future    Acute pain of right shoulder  -     XR shoulder 2+ vw right; Future    Other orders  -     aspirin 81 MG tablet; Take 1 tablet by mouth daily  -     traMADol (ULTRAM) 50 mg tablet; Take 1 tablet by mouth every 6 (six) hours as needed  -     SUMAtriptan (IMITREX) 25 mg tablet; Take 1 tablet by mouth  -     Discontinue: zolpidem (AMBIEN) 10 mg tablet; Take 1 tablet by mouth  -     venlafaxine (EFFEXOR-XR) 150 mg 24 hr capsule; Take 2 capsules by mouth daily  -     fluticasone (FLONASE) 50 mcg/act nasal spray; 2 sprays into each nostril daily  -     albuterol (PROAIR HFA) 90 mcg/act inhaler; Inhale 2 puffs  -     ALPRAZolam (XANAX) 0 5 mg tablet; Take 1 tablet by mouth      Will check shoulder and thoracic spine xrays  Patient's wife is a PT and will have her work with him regarding his upper back and shoulder pain while we await results  Patient already has tramadol  Continue with this and tylenol/ibuprofen  Return in 1-2 weeks for follow-up or sooner as needed  Subjective:      Patient ID: Gómez Patterson is a 38 y o  male  Patient presents for an acute visit  He reports that a couple of weeks ago, he started to have upper back pain  He denies a known injury  Back Pain   This is a new problem  The current episode started 1 to 4 weeks ago  The problem occurs constantly  The problem is unchanged  The pain is present in the thoracic spine  The quality of the pain is described as aching and burning  Radiates to: right shoulder  The pain is at a severity of 5/10  The pain is moderate  The pain is the same all the time  The symptoms are aggravated by twisting  Stiffness is present all day  Associated symptoms include numbness and paresthesias   Pertinent negatives include no abdominal pain, bladder incontinence, bowel incontinence, chest pain, dysuria, fever, headaches, paresis or weakness  (Patient reports numbness/tingling in his right arm ) He has tried NSAIDs, analgesics and chiropractic manipulation for the symptoms  The treatment provided mild relief  Shoulder Pain    The pain is present in the right shoulder  This is a new problem  The current episode started yesterday  There has been no history of extremity trauma  The problem occurs constantly  The problem has been unchanged  The quality of the pain is described as dull  The pain is at a severity of 4/10  The pain is mild  Associated symptoms include numbness  Pertinent negatives include no fever  The symptoms are aggravated by activity  He has tried NSAIDS, acetaminophen and oral narcotics for the symptoms  The treatment provided no relief  Family history does not include gout or rheumatoid arthritis  His past medical history is significant for osteoarthritis  The following portions of the patient's history were reviewed and updated as appropriate: allergies, current medications, past family history, past medical history, past social history, past surgical history and problem list     Review of Systems   Constitutional: Negative for chills, fatigue and fever  Eyes: Negative for pain  Respiratory: Positive for cough  Negative for wheezing  Patient was previously diagnosed with bronchitis and feels that he still has a residual cough from this  Cardiovascular: Negative for chest pain  Gastrointestinal: Negative for abdominal pain, bowel incontinence, diarrhea, nausea and vomiting  Genitourinary: Negative for bladder incontinence and dysuria  Musculoskeletal: Positive for back pain  Skin: Negative for rash  Neurological: Positive for numbness and paresthesias  Negative for dizziness, weakness, light-headedness and headaches  Psychiatric/Behavioral: The patient is nervous/anxious            Objective:     Physical Exam Constitutional: He is oriented to person, place, and time  He appears well-developed and well-nourished  No distress  HENT:   Head: Normocephalic and atraumatic  Right Ear: External ear normal    Left Ear: External ear normal    Mouth/Throat: Oropharynx is clear and moist    Eyes: Conjunctivae are normal  Pupils are equal, round, and reactive to light  No scleral icterus  Neck: Normal range of motion  Neck supple  No thyromegaly present  Cardiovascular: Normal rate, regular rhythm and normal heart sounds  Pulmonary/Chest: Effort normal and breath sounds normal  No respiratory distress  Abdominal: Soft  Bowel sounds are normal  He exhibits no distension  Musculoskeletal: He exhibits no edema  Right shoulder: He exhibits decreased range of motion, tenderness and crepitus  He exhibits no swelling and no deformity  Arms:  Neurological: He is alert and oriented to person, place, and time  Skin: Skin is warm and dry  Psychiatric: He has a normal mood and affect  His behavior is normal  Judgment and thought content normal    Vitals reviewed

## 2018-02-15 ENCOUNTER — OFFICE VISIT (OUTPATIENT)
Dept: INTERNAL MEDICINE CLINIC | Facility: CLINIC | Age: 43
End: 2018-02-15
Payer: COMMERCIAL

## 2018-02-15 VITALS
HEIGHT: 68 IN | SYSTOLIC BLOOD PRESSURE: 128 MMHG | DIASTOLIC BLOOD PRESSURE: 86 MMHG | HEART RATE: 78 BPM | WEIGHT: 218.2 LBS | OXYGEN SATURATION: 98 % | TEMPERATURE: 98.1 F | BODY MASS INDEX: 33.07 KG/M2

## 2018-02-15 DIAGNOSIS — I10 ESSENTIAL HYPERTENSION: ICD-10-CM

## 2018-02-15 DIAGNOSIS — R51.9 NONINTRACTABLE EPISODIC HEADACHE, UNSPECIFIED HEADACHE TYPE: ICD-10-CM

## 2018-02-15 DIAGNOSIS — K76.0 FATTY LIVER: ICD-10-CM

## 2018-02-15 DIAGNOSIS — F41.1 GENERALIZED ANXIETY DISORDER: ICD-10-CM

## 2018-02-15 DIAGNOSIS — M62.830 BACK MUSCLE SPASM: ICD-10-CM

## 2018-02-15 DIAGNOSIS — E55.9 VITAMIN D DEFICIENCY: ICD-10-CM

## 2018-02-15 DIAGNOSIS — M54.2 NECK PAIN: Primary | ICD-10-CM

## 2018-02-15 DIAGNOSIS — F41.9 ANXIETY: ICD-10-CM

## 2018-02-15 PROCEDURE — 3074F SYST BP LT 130 MM HG: CPT | Performed by: NURSE PRACTITIONER

## 2018-02-15 PROCEDURE — 36415 COLL VENOUS BLD VENIPUNCTURE: CPT | Performed by: NURSE PRACTITIONER

## 2018-02-15 PROCEDURE — 99213 OFFICE O/P EST LOW 20 MIN: CPT | Performed by: NURSE PRACTITIONER

## 2018-02-15 PROCEDURE — 3079F DIAST BP 80-89 MM HG: CPT | Performed by: NURSE PRACTITIONER

## 2018-02-15 RX ORDER — TRAMADOL HYDROCHLORIDE 50 MG/1
50 TABLET ORAL EVERY 6 HOURS PRN
Qty: 30 TABLET | Refills: 0 | Status: CANCELLED | OUTPATIENT
Start: 2018-02-15

## 2018-02-15 RX ORDER — CYCLOBENZAPRINE HCL 10 MG
10 TABLET ORAL 3 TIMES DAILY PRN
Qty: 30 TABLET | Refills: 1 | Status: SHIPPED | OUTPATIENT
Start: 2018-02-15 | End: 2018-09-11 | Stop reason: ALTCHOICE

## 2018-02-15 NOTE — PATIENT INSTRUCTIONS
Have labs done and get xrays done as discussed  Start taking flexeril as needed for back spasms  Do not drive or operate heavy machinery while taking this medication  Return for follow up on chronic conditions in 2-4 weeks

## 2018-02-15 NOTE — PROGRESS NOTES
Assessment/Plan:    No problem-specific Assessment & Plan notes found for this encounter  Diagnoses and all orders for this visit:    Neck pain    Back muscle spasm  -     cyclobenzaprine (FLEXERIL) 10 mg tablet; Take 1 tablet (10 mg total) by mouth 3 (three) times a day as needed for muscle spasms      Have labs done and get xrays done as discussed  Start taking flexeril as needed for back spasms  Do not drive or operate heavy machinery while taking this medication  Return for follow up on chronic conditions in 2-4 weeks  Subjective:      Patient ID: Kirstie Oscar is a 38 y o  male  Patient presents for a 2 week follow up for back and shoulder pain  He reports that he did not get the xrays done, reporting that he forgot  He reports that he still has the pain  He reports that he had a "popping" sensation after rolling his shoulder back and reports that his pain was significantly improved for about 24 hours after he did that  Shoulder Pain    The pain is present in the right shoulder and back  This is a recurrent problem  The current episode started more than 1 month ago  There has been no history of extremity trauma  The problem occurs constantly  The problem has been waxing and waning  The quality of the pain is described as sharp and aching  The pain is at a severity of 3/10  Associated symptoms include a limited range of motion, numbness, stiffness and tingling  Pertinent negatives include no fever, itching or joint swelling  The symptoms are aggravated by activity  He has tried oral narcotics for the symptoms  The treatment provided mild relief  The following portions of the patient's history were reviewed and updated as appropriate: allergies, current medications, past family history, past medical history, past social history, past surgical history and problem list     Review of Systems   Constitutional: Negative for chills, fatigue and fever     HENT: Negative for congestion and trouble swallowing  Eyes: Negative for pain  Respiratory: Negative for chest tightness, shortness of breath and wheezing  Cardiovascular: Negative for chest pain, palpitations and leg swelling  Gastrointestinal: Negative for abdominal pain, diarrhea, nausea and vomiting  Genitourinary: Negative for dysuria  Musculoskeletal: Positive for stiffness  Negative for gait problem  As noted in HPI     Skin: Negative for itching and rash  Neurological: Positive for tingling and numbness  Negative for dizziness, syncope and headaches  Hematological: Does not bruise/bleed easily  Psychiatric/Behavioral: Negative for confusion and decreased concentration  The patient is nervous/anxious            Past Medical History:   Diagnosis Date    Accelerated essential hypertension     Colitis     [facility]     Elevated ALT measurement     Headache     Holter monitor, abnormal     PE (pulmonary thromboembolism) ([facility])     Syncope          Current Outpatient Prescriptions:     albuterol (PROAIR HFA) 90 mcg/act inhaler, Inhale 2 puffs, Disp: , Rfl:     ALPRAZolam (XANAX) 0 5 mg tablet, Take 1 tablet by mouth, Disp: , Rfl:     aspirin 81 MG tablet, Take 1 tablet by mouth daily, Disp: , Rfl:     fluticasone (FLONASE) 50 mcg/act nasal spray, 2 sprays into each nostril daily, Disp: , Rfl:     SUMAtriptan (IMITREX) 25 mg tablet, Take 1 tablet by mouth, Disp: , Rfl:     traMADol (ULTRAM) 50 mg tablet, Take 1 tablet by mouth every 6 (six) hours as needed, Disp: , Rfl:     venlafaxine (EFFEXOR-XR) 150 mg 24 hr capsule, Take 2 capsules by mouth daily, Disp: , Rfl:     zolpidem (AMBIEN) 10 mg tablet, Take 10 mg by mouth daily at bedtime as needed for sleep, Disp: , Rfl:     cyclobenzaprine (FLEXERIL) 10 mg tablet, Take 1 tablet (10 mg total) by mouth 3 (three) times a day as needed for muscle spasms, Disp: 30 tablet, Rfl: 1    Allergies   Allergen Reactions    Lidocaine Anaphylaxis  Lidocaine Hcl        Social History   Past Surgical History:   Procedure Laterality Date    HERNIA REPAIR Right     groin    KNEE ARTHROSCOPY Left     x2    KNEE SURGERY  10/2015    SHOULDER SURGERY Left     3x    VASECTOMY       Family History   Problem Relation Age of Onset   Clifm Rohan Migraines Mother     Diabetes Mother     Hypertension Father     Diabetes Father     Cancer Sister     Cancer Brother     Hypertension Brother     Liver disease Brother      fatty liver    Cancer Maternal Grandfather     Other Maternal Grandfather      history of travel    Hypertension Paternal Grandmother     Diabetes Paternal Grandmother     Hypertension Paternal Grandfather     Cancer Other     Cancer Maternal Aunt        Objective:  /86 (BP Location: Left arm, Patient Position: Sitting, Cuff Size: Adult)   Pulse 78   Temp 98 1 °F (36 7 °C) (Oral)   Ht 5' 8" (1 727 m)   Wt 99 kg (218 lb 3 2 oz)   SpO2 98%   BMI 33 18 kg/m²        Physical Exam   Constitutional: He is oriented to person, place, and time  He appears well-developed and well-nourished  No distress  HENT:   Head: Normocephalic and atraumatic  Right Ear: External ear normal    Left Ear: External ear normal    Mouth/Throat: Oropharynx is clear and moist    Eyes: Conjunctivae are normal  Pupils are equal, round, and reactive to light  No scleral icterus  Neck: Normal range of motion  Neck supple  No thyromegaly present  Cardiovascular: Normal rate, regular rhythm and normal heart sounds  Pulmonary/Chest: Effort normal and breath sounds normal  No respiratory distress  Abdominal: Soft  Bowel sounds are normal  He exhibits no distension  Musculoskeletal: He exhibits tenderness (Right shoulder tenderness, paraspinal tenderness consistent with muscle spasm) and deformity  He exhibits no edema  Neurological: He is alert and oriented to person, place, and time  Skin: Skin is warm and dry     Psychiatric: He has a normal mood and affect  His behavior is normal  Judgment and thought content normal    Vitals reviewed

## 2018-02-16 LAB
25(OH)D3+25(OH)D2 SERPL-MCNC: 17.9 NG/ML (ref 30–100)
CHOLEST SERPL-MCNC: 220 MG/DL (ref 100–199)
ERYTHROCYTE [DISTWIDTH] IN BLOOD BY AUTOMATED COUNT: 13.6 % (ref 12.3–15.4)
HCT VFR BLD AUTO: 45.1 % (ref 37.5–51)
HDLC SERPL-MCNC: 67 MG/DL
HGB BLD-MCNC: 15.5 G/DL (ref 13–17.7)
LDLC SERPL CALC-MCNC: 138 MG/DL (ref 0–99)
MCH RBC QN AUTO: 31.3 PG (ref 26.6–33)
MCHC RBC AUTO-ENTMCNC: 34.4 G/DL (ref 31.5–35.7)
MCV RBC AUTO: 91 FL (ref 79–97)
PLATELET # BLD AUTO: 309 X10E3/UL (ref 150–379)
RBC # BLD AUTO: 4.96 X10E6/UL (ref 4.14–5.8)
SL AMB VLDL CHOLESTEROL CALC: 15 MG/DL (ref 5–40)
T4 FREE SERPL-MCNC: 1.28 NG/DL (ref 0.82–1.77)
TRIGL SERPL-MCNC: 74 MG/DL (ref 0–149)
TSH SERPL DL<=0.005 MIU/L-ACNC: 0.77 UIU/ML (ref 0.45–4.5)
WBC # BLD AUTO: 5.4 X10E3/UL (ref 3.4–10.8)

## 2018-04-13 ENCOUNTER — OFFICE VISIT (OUTPATIENT)
Dept: INTERNAL MEDICINE CLINIC | Facility: CLINIC | Age: 43
End: 2018-04-13
Payer: COMMERCIAL

## 2018-04-13 VITALS
SYSTOLIC BLOOD PRESSURE: 132 MMHG | BODY MASS INDEX: 31.73 KG/M2 | DIASTOLIC BLOOD PRESSURE: 80 MMHG | OXYGEN SATURATION: 96 % | WEIGHT: 214.2 LBS | TEMPERATURE: 98.6 F | HEART RATE: 78 BPM | HEIGHT: 69 IN

## 2018-04-13 DIAGNOSIS — R10.11 RIGHT UPPER QUADRANT ABDOMINAL PAIN: Primary | ICD-10-CM

## 2018-04-13 DIAGNOSIS — K76.0 FATTY LIVER: ICD-10-CM

## 2018-04-13 DIAGNOSIS — G43.109 MIGRAINE WITH AURA AND WITHOUT STATUS MIGRAINOSUS, NOT INTRACTABLE: ICD-10-CM

## 2018-04-13 DIAGNOSIS — K21.9 GASTROESOPHAGEAL REFLUX DISEASE WITHOUT ESOPHAGITIS: ICD-10-CM

## 2018-04-13 PROBLEM — R10.9 ABDOMINAL PAIN: Status: ACTIVE | Noted: 2018-04-13

## 2018-04-13 PROCEDURE — 99213 OFFICE O/P EST LOW 20 MIN: CPT | Performed by: NURSE PRACTITIONER

## 2018-04-13 PROCEDURE — 36415 COLL VENOUS BLD VENIPUNCTURE: CPT | Performed by: NURSE PRACTITIONER

## 2018-04-13 RX ORDER — TRAMADOL HYDROCHLORIDE 50 MG/1
50 TABLET ORAL EVERY 6 HOURS PRN
Qty: 30 TABLET | Refills: 0 | Status: SHIPPED | OUTPATIENT
Start: 2018-04-13 | End: 2018-05-15 | Stop reason: SDUPTHER

## 2018-04-13 NOTE — ASSESSMENT & PLAN NOTE
Patient reports that he takes his tramadol for his migraines and only uses as needed, last fill of tramadol was in November of 2017  Will give 30 tablets

## 2018-04-13 NOTE — PROGRESS NOTES
Assessment/Plan:    Abdominal pain    Will give script for ultrasound of gallbladder ( to rule out gallstones)  Advised patient to avoid foods high in fatty content as well as alcohol,  discussed a gallbladder diet  Will restart patient on Nexium  Will get blood work including amylase lipase CBC with diff and CMP  Classic migraine    Patient reports that he takes his tramadol for his migraines and only uses as needed, last fill of tramadol was in November of 2017  Will give 30 tablets  Diagnoses and all orders for this visit:    Right upper quadrant abdominal pain  -     Lipase; Future  -     Amylase; Future  -     CBC and differential  -     Comprehensive metabolic panel; Future  -     Cancel: Lipid panel  -     esomeprazole (NexIUM) 20 mg capsule; Take 1 capsule (20 mg total) by mouth daily  -     US gallbladder; Future    Migraine with aura and without status migrainosus, not intractable  -     traMADol (ULTRAM) 50 mg tablet; Take 1 tablet (50 mg total) by mouth every 6 (six) hours as needed (as needed)    Gastroesophageal reflux disease without esophagitis          Subjective:      Patient ID: Jocelin Baptiste is a 38 y o  male  Pt  Presents today for stomach issues, has been going on for about a month  He feels nausous all the time  Pt  Used to be on Nexium in the past for GERD  In the past week he has notice white dots in his stool  He reports that he has lots of gas  Denies rectal itching  Has a bowel movement daily  He has not changed anything in his diet  GI Problem   The primary symptoms include abdominal pain and nausea  Primary symptoms do not include fever, weight loss, fatigue, vomiting, diarrhea, melena, hematemesis, jaundice, dysuria, myalgias or rash  Episode onset: one month ago  The onset was sudden  The problem has not changed since onset  Onset: one month  The abdominal pain is located in the epigastric region  The abdominal pain does not radiate   The abdominal pain is relieved by nothing  Nausea began today  The illness does not include bloating or constipation  The following portions of the patient's history were reviewed and updated as appropriate: allergies, current medications, past family history, past medical history, past social history, past surgical history and problem list     Review of Systems   Constitutional: Positive for appetite change (not as hungery as he used to be)  Negative for activity change, fatigue, fever and weight loss  HENT: Negative for congestion, ear discharge, ear pain, sinus pain and sore throat  Eyes: Negative for pain, discharge and itching  Respiratory: Negative for cough, shortness of breath and wheezing  Cardiovascular: Negative for chest pain, palpitations and leg swelling  Gastrointestinal: Positive for abdominal pain and nausea  Negative for bloating, blood in stool, constipation, diarrhea, hematemesis, jaundice, melena and vomiting  Genitourinary: Negative for dysuria, flank pain and hematuria  Musculoskeletal: Negative for myalgias  Skin: Negative for rash and wound  Neurological: Negative for dizziness, numbness and headaches          Chronic headaches         Past Medical History:   Diagnosis Date    Accelerated essential hypertension     Colitis     [facility]     Elevated ALT measurement     Headache     Holter monitor, abnormal     PE [facility]pulmonary throm[facility]embolism) [facility]     Syncope          Current Outpatient Prescriptions:     albuterol (PROAIR HFA) 90 mcg/act inhaler, Inhale 2 puffs, Disp: , Rfl:     ALPRAZolam (XANAX) 0 5 mg tablet, Take 1 tablet by mouth, Disp: , Rfl:     cyclobenzaprine (FLEXERIL) 10 mg tablet, Take 1 tablet (10 mg total) by mouth 3 (three) times a day as needed for muscle spasms, Disp: 30 tablet, Rfl: 1    fluticasone (FLONASE) 50 mcg/act nasal spray, 2 sprays into each nostril daily, Disp: , Rfl:     SUMAtriptan (IMITREX) 25 mg tablet, Take 1 tablet by mouth, Disp: , Rfl:     traMADol (ULTRAM) 50 mg tablet, Take 1 tablet (50 mg total) by mouth every 6 (six) hours as needed (as needed), Disp: 30 tablet, Rfl: 0    venlafaxine (EFFEXOR-XR) 150 mg 24 hr capsule, Take 2 capsules by mouth daily, Disp: , Rfl:     zolpidem (AMBIEN) 10 mg tablet, Take 10 mg by mouth daily at bedtime as needed for sleep, Disp: , Rfl:     aspirin 81 MG tablet, Take 1 tablet by mouth daily, Disp: , Rfl:     esomeprazole (NexIUM) 20 mg capsule, Take 1 capsule (20 mg total) by mouth daily, Disp: 30 capsule, Rfl: 1    Allergies   Allergen Reactions    Lidocaine Anaphylaxis    Lidocaine Hcl        Social History   Past Surgical History:   Procedure Laterality Date    HERNIA REPAIR Right     groin    KNEE ARTHROSCOPY Left     x2    KNEE SURGERY  10/2015    SHOULDER SURGERY Left     3x    VASECTOMY       Family History   Problem Relation Age of Onset    Migraines Mother     Diabetes Mother     Hypertension Father     Diabetes Father     Cancer Sister     Cancer Brother     Hypertension Brother     Liver disease Brother      fatty liver    Cancer Maternal Grandfather     Other Maternal Grandfather      history of travel    Hypertension Paternal Grandmother     Diabetes Paternal Grandmother     Hypertension Paternal Grandfather     Cancer Other     Cancer Maternal Aunt        Objective:  /80 (BP Location: Left arm, Patient Position: Sitting, Cuff Size: Large)   Pulse 78   Temp 98 6 °F (37 °C) (Oral)   Ht 5' 8 5" (1 74 m)   Wt 97 2 kg (214 lb 3 2 oz)   SpO2 96%   BMI 32 10 kg/m²     No results found for this or any previous visit (from the past 1344 hour(s))  Physical Exam   Constitutional: He is oriented to person, place, and time  He appears well-developed and well-nourished  No distress  HENT:   Head: Normocephalic and atraumatic     Right Ear: External ear normal    Left Ear: External ear normal    Nose: Nose normal    Mouth/Throat: Oropharynx is clear and moist  No oropharyngeal exudate  Eyes: Conjunctivae and EOM are normal  Pupils are equal, round, and reactive to light  Right eye exhibits no discharge  Left eye exhibits no discharge  Neck: Normal range of motion  Neck supple  No thyromegaly present  Cardiovascular: Normal rate, regular rhythm, normal heart sounds and intact distal pulses  Exam reveals no gallop and no friction rub  No murmur heard  Pulmonary/Chest: Effort normal and breath sounds normal  No stridor  No respiratory distress  He has no wheezes  He has no rales  Abdominal: Soft  Bowel sounds are normal  He exhibits no distension and no mass  There is tenderness (RUQ) in the right upper quadrant  There is no rigidity, no rebound, no guarding, no tenderness at McBurney's point and negative Sun's sign  No hernia  Musculoskeletal: He exhibits no edema  Lymphadenopathy:     He has no cervical adenopathy  Neurological: He is alert and oriented to person, place, and time  Skin: Skin is warm and dry  No rash noted  He is not diaphoretic  No erythema  Psychiatric: He has a normal mood and affect   His behavior is normal  Judgment and thought content normal

## 2018-04-13 NOTE — PATIENT INSTRUCTIONS
Avoid fatty foods, alcohol  Get ultrasound done of right upper quadrant  Get blood work done in office today  Take Nexium for GERD daily

## 2018-04-13 NOTE — ASSESSMENT & PLAN NOTE
Will give script for ultrasound of gallbladder ( to rule out gallstones)  Advised patient to avoid foods high in fatty content as well as alcohol,  discussed a gallbladder diet  Will restart patient on Nexium  Will get blood work including amylase lipase CBC with diff and CMP

## 2018-04-14 LAB
ALBUMIN SERPL-MCNC: 5 G/DL (ref 3.6–5.1)
ALBUMIN/GLOB SERPL: 2.2 (CALC) (ref 1–2.5)
ALP SERPL-CCNC: 93 U/L (ref 40–115)
ALT SERPL-CCNC: 62 U/L (ref 9–46)
AMYLASE SERPL-CCNC: 45 U/L (ref 21–101)
AST SERPL-CCNC: 32 U/L (ref 10–40)
BASOPHILS # BLD AUTO: 41 CELLS/UL (ref 0–200)
BASOPHILS NFR BLD AUTO: 0.6 %
BILIRUB SERPL-MCNC: 0.5 MG/DL (ref 0.2–1.2)
BUN SERPL-MCNC: 10 MG/DL (ref 7–25)
BUN/CREAT SERPL: ABNORMAL (CALC) (ref 6–22)
CALCIUM SERPL-MCNC: 10 MG/DL (ref 8.6–10.3)
CHLORIDE SERPL-SCNC: 102 MMOL/L (ref 98–110)
CO2 SERPL-SCNC: 26 MMOL/L (ref 20–31)
CREAT SERPL-MCNC: 0.95 MG/DL (ref 0.6–1.35)
EOSINOPHIL # BLD AUTO: 48 CELLS/UL (ref 15–500)
EOSINOPHIL NFR BLD AUTO: 0.7 %
ERYTHROCYTE [DISTWIDTH] IN BLOOD BY AUTOMATED COUNT: 12.3 % (ref 11–15)
GLOBULIN SER CALC-MCNC: 2.3 G/DL (CALC) (ref 1.9–3.7)
GLUCOSE SERPL-MCNC: 96 MG/DL (ref 65–99)
HCT VFR BLD AUTO: 46 % (ref 38.5–50)
HGB BLD-MCNC: 15.9 G/DL (ref 13.2–17.1)
LIPASE SERPL-CCNC: 13 U/L (ref 7–60)
LYMPHOCYTES # BLD AUTO: 3142 CELLS/UL (ref 850–3900)
LYMPHOCYTES NFR BLD AUTO: 46.2 %
MCH RBC QN AUTO: 31.9 PG (ref 27–33)
MCHC RBC AUTO-ENTMCNC: 34.6 G/DL (ref 32–36)
MCV RBC AUTO: 92.4 FL (ref 80–100)
MONOCYTES # BLD AUTO: 592 CELLS/UL (ref 200–950)
MONOCYTES NFR BLD AUTO: 8.7 %
NEUTROPHILS # BLD AUTO: 2978 CELLS/UL (ref 1500–7800)
NEUTROPHILS NFR BLD AUTO: 43.8 %
PLATELET # BLD AUTO: 305 THOUSAND/UL (ref 140–400)
PMV BLD REES-ECKER: 10.4 FL (ref 7.5–12.5)
POTASSIUM SERPL-SCNC: 4.3 MMOL/L (ref 3.5–5.3)
PROT SERPL-MCNC: 7.3 G/DL (ref 6.1–8.1)
RBC # BLD AUTO: 4.98 MILLION/UL (ref 4.2–5.8)
SL AMB EGFR AFRICAN AMERICAN: 116 ML/MIN/1.73M2
SL AMB EGFR NON AFRICAN AMERICAN: 100 ML/MIN/1.73M2
SODIUM SERPL-SCNC: 142 MMOL/L (ref 135–146)
WBC # BLD AUTO: 6.8 THOUSAND/UL (ref 3.8–10.8)

## 2018-04-17 NOTE — PROGRESS NOTES
Ordered liver ultrasound, as well as screening for hep C and hep B  Patient has history of elevated ALT  Called patient left a message on machine, advised patient to call back if he has any questions

## 2018-05-04 ENCOUNTER — HOSPITAL ENCOUNTER (OUTPATIENT)
Dept: RADIOLOGY | Age: 43
Discharge: HOME | End: 2018-05-04
Payer: COMMERCIAL

## 2018-05-04 DIAGNOSIS — K76.0 FATTY LIVER: ICD-10-CM

## 2018-05-04 DIAGNOSIS — R10.11 RIGHT UPPER QUADRANT ABDOMINAL PAIN: ICD-10-CM

## 2018-05-04 PROCEDURE — 76705 ECHO EXAM OF ABDOMEN: CPT

## 2018-05-15 ENCOUNTER — OFFICE VISIT (OUTPATIENT)
Dept: INTERNAL MEDICINE CLINIC | Facility: CLINIC | Age: 43
End: 2018-05-15
Payer: COMMERCIAL

## 2018-05-15 VITALS
RESPIRATION RATE: 18 BRPM | DIASTOLIC BLOOD PRESSURE: 88 MMHG | WEIGHT: 209.8 LBS | OXYGEN SATURATION: 97 % | BODY MASS INDEX: 31.07 KG/M2 | SYSTOLIC BLOOD PRESSURE: 134 MMHG | TEMPERATURE: 97.9 F | HEIGHT: 69 IN | HEART RATE: 60 BPM

## 2018-05-15 DIAGNOSIS — R11.0 NAUSEA: ICD-10-CM

## 2018-05-15 DIAGNOSIS — F41.1 GENERALIZED ANXIETY DISORDER: ICD-10-CM

## 2018-05-15 DIAGNOSIS — G43.109 MIGRAINE WITH AURA AND WITHOUT STATUS MIGRAINOSUS, NOT INTRACTABLE: ICD-10-CM

## 2018-05-15 DIAGNOSIS — R16.0 LIVER ENLARGEMENT: ICD-10-CM

## 2018-05-15 DIAGNOSIS — F41.9 ANXIETY: Primary | ICD-10-CM

## 2018-05-15 DIAGNOSIS — K76.0 HEPATIC STEATOSIS: ICD-10-CM

## 2018-05-15 DIAGNOSIS — K21.9 GASTROESOPHAGEAL REFLUX DISEASE WITHOUT ESOPHAGITIS: ICD-10-CM

## 2018-05-15 DIAGNOSIS — F51.01 PRIMARY INSOMNIA: ICD-10-CM

## 2018-05-15 DIAGNOSIS — E63.9 POOR EATING HABITS: ICD-10-CM

## 2018-05-15 DIAGNOSIS — F51.04 CHRONIC INSOMNIA: ICD-10-CM

## 2018-05-15 DIAGNOSIS — R76.0 LUPUS ANTICOAGULANT POSITIVE: ICD-10-CM

## 2018-05-15 DIAGNOSIS — E55.9 VITAMIN D DEFICIENCY: ICD-10-CM

## 2018-05-15 DIAGNOSIS — G47.33 OSA (OBSTRUCTIVE SLEEP APNEA): ICD-10-CM

## 2018-05-15 DIAGNOSIS — M54.2 NECK PAIN: ICD-10-CM

## 2018-05-15 DIAGNOSIS — R10.11 RIGHT UPPER QUADRANT ABDOMINAL PAIN: ICD-10-CM

## 2018-05-15 PROCEDURE — 99214 OFFICE O/P EST MOD 30 MIN: CPT | Performed by: FAMILY MEDICINE

## 2018-05-15 RX ORDER — ALPRAZOLAM 0.5 MG/1
0.5 TABLET ORAL DAILY PRN
Qty: 30 TABLET | Refills: 0 | Status: SHIPPED | OUTPATIENT
Start: 2018-05-15 | End: 2018-05-15 | Stop reason: SDUPTHER

## 2018-05-15 RX ORDER — ZOLPIDEM TARTRATE 10 MG/1
10 TABLET ORAL
Qty: 30 TABLET | Refills: 3 | Status: SHIPPED | OUTPATIENT
Start: 2018-05-15 | End: 2019-01-08 | Stop reason: SDUPTHER

## 2018-05-15 RX ORDER — TRAMADOL HYDROCHLORIDE 50 MG/1
50 TABLET ORAL EVERY 6 HOURS PRN
Qty: 60 TABLET | Refills: 3 | Status: SHIPPED | OUTPATIENT
Start: 2018-05-15 | End: 2019-01-08 | Stop reason: SDUPTHER

## 2018-05-15 RX ORDER — ZOLPIDEM TARTRATE 10 MG/1
TABLET ORAL
Qty: 30 TABLET | OUTPATIENT
Start: 2018-05-15

## 2018-05-15 RX ORDER — PANTOPRAZOLE SODIUM 40 MG/1
40 TABLET, DELAYED RELEASE ORAL DAILY
Qty: 30 TABLET | Refills: 5 | Status: SHIPPED | OUTPATIENT
Start: 2018-05-15 | End: 2019-01-08 | Stop reason: SDUPTHER

## 2018-05-15 RX ORDER — ALPRAZOLAM 0.5 MG/1
0.5 TABLET ORAL DAILY PRN
Qty: 30 TABLET | Refills: 3 | Status: SHIPPED | OUTPATIENT
Start: 2018-05-15 | End: 2019-01-08 | Stop reason: SDUPTHER

## 2018-05-15 RX ORDER — VENLAFAXINE HYDROCHLORIDE 150 MG/1
300 CAPSULE, EXTENDED RELEASE ORAL DAILY
Qty: 30 CAPSULE | Refills: 5 | Status: SHIPPED | OUTPATIENT
Start: 2018-05-15 | End: 2018-10-29 | Stop reason: SDUPTHER

## 2018-05-15 NOTE — PROGRESS NOTES
Assessment/Plan:    BHARGAV (obstructive sleep apnea)    Has CPAP machine at home    Classic migraine   Not having full  migraines but is having intermittent headaches that still require tramadol at times    Poor eating habits   Discussed eating a proper breakfast with protein and keeping up with fluid intake not skipping meals  Okay to have   Healthy snacking throughout the day and proper dinner  Generalized anxiety disorder   Refill Xanax today which he uses infrequently and continue with Effexor dose  No changes  Vitamin D deficiency    Advised over-the-counter replacement and check labs    Lupus anticoagulant positive   Has had full workup by Hematology last no reviewed continue with aspirin daily and has completed Coumadin    Neck pain   Uses tramadol p r n  And is on aspirin 81 mg for previous blood clot  Contributing to headaches from time to time    Chronic insomnia   Uses Ambien p r n  Abdominal pain   All labs reviewed as well as ultrasound  No signs of gallbladder dysfunction reviewed fatty liver and discuss weight loss and proper eating with patient  Nexium for now since is not covered by insurance and start Protonix, patient is unsure of was lidocaine allergy and states he has tolerated Novocain and Xanax without any problems    Nausea   Discussed proper eating habits  Will continue to follow  Laboratory data reviewed    Stay hydrated         Problem List Items Addressed This Visit        Digestive    Acid reflux    Relevant Medications    pantoprazole (PROTONIX) 40 mg tablet    Hepatic steatosis    Liver enlargement       Respiratory    BHARGAV (obstructive sleep apnea)       Has CPAP machine at home            Cardiovascular and Mediastinum    Classic migraine      Not having full  migraines but is having intermittent headaches that still require tramadol at times         Relevant Medications    traMADol (ULTRAM) 50 mg tablet    venlafaxine (EFFEXOR-XR) 150 mg 24 hr capsule       Hematopoietic and Hemostatic    Lupus anticoagulant positive      Has had full workup by Hematology last no reviewed continue with aspirin daily and has completed Coumadin            Other    Nausea      Discussed proper eating habits  Will continue to follow  Laboratory data reviewed  Stay hydrated         Neck pain      Uses tramadol p r n  And is on aspirin 81 mg for previous blood clot  Contributing to headaches from time to time         Chronic insomnia      Uses Ambien p r n  Generalized anxiety disorder      Refill Xanax today which he uses infrequently and continue with Effexor dose  No changes  Relevant Medications    venlafaxine (EFFEXOR-XR) 150 mg 24 hr capsule    ALPRAZolam (XANAX) 0 5 mg tablet    zolpidem (AMBIEN) 10 mg tablet    Vitamin D deficiency       Advised over-the-counter replacement and check labs         Abdominal pain      All labs reviewed as well as ultrasound  No signs of gallbladder dysfunction reviewed fatty liver and discuss weight loss and proper eating with patient  Nexium for now since is not covered by insurance and start Protonix, patient is unsure of was lidocaine allergy and states he has tolerated Novocain and Xanax without any problems         Poor eating habits      Discussed eating a proper breakfast with protein and keeping up with fluid intake not skipping meals  Okay to have   Healthy snacking throughout the day and proper dinner  Other Visit Diagnoses     Anxiety    -  Primary    Relevant Medications    venlafaxine (EFFEXOR-XR) 150 mg 24 hr capsule    ALPRAZolam (XANAX) 0 5 mg tablet    Primary insomnia        Relevant Medications    zolpidem (AMBIEN) 10 mg tablet            Subjective:      Patient ID: Gerarda Goldmann is a 38 y o  male  HPI  Patient here for previous abdominal pain  Having any abdominal pain right now  Had lab work and ultrasound  Still having some intermittent nausea but no actual vomiting  Taking over-the-counter Nexium  Thinks he seeing some white dots in his stool but stooling in urination is normal   He to right scapula morning for breakfast but then does not eat anything all day until dinnertime which can range anywhere between 5:00 p m  In the p m  No recent illnesses no changes in medications no sick contacts  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 that 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 and increase mild distress  Generalized Anxiety Disorder (Brief): The patient is being seen for a routine clinic follow-up of anxiety disorder  Symptoms:  no palpitations,-no chest discomfort,-no trouble breathing,-no trembling,-no paresthesias,-no lightheadedness,-no nausea,-no abdominal discomfort,-no excessive sweating,-no hot flashes,-no racing thoughts,-no excessive worrying,-no fear of dying,-no fear of losing control,-no flashbacks,-no repetitive behaviors-and-no sleep disturbance  The patient is currently asymptomatic  Associated symptoms:  no poor concentration,-no memory impairment,-no avoidance of physical exertion,-no irritability,-no agitation,-no hostility-and-no depression symptoms  Suicidal risk:  no suicidal thoughts  Homicidal risk:  no homicidal thoughts  Current treatment includes selective serotonin-norepinephrine reuptake inhibitors and benzodiazepines  There are no medication side effects  (taking xanax a few times per months, uses ambien at night 2-3 times per week  feels well  )   Headache (Follow-Up): The patient is being seen for follow-up of headache  The patient reports doing well  Interval Events: no change in vision, no new meds, no stress or trauma, uses couamdin, always with a HA 24/7, tylenol not helping, never had them in the past  no trauma  no recent illneses  no OTC meds  10/17/16: reviewed records, collette not helpjudson now  saw neuro  has had 2 HA since d/c  9/25/17: has been doing well and saw neurology who did to have any suggestions  lately he is noticing headaches again but ot as severe   advil and tramadol work now  He has had no significant interval events  Interval symptoms:  worsened headache,-denies nausea,-denies vomiting,-denies photophobia,-denies phonophobia,-denies paresthesias,-denies localized weakness,-denies aura-and-denies scotoma  Associated symptoms: no nasal congestion,-no facial pain,-no neck pain,-no vertigo-and-no lightheadedness  The patient is not currently on medication for this problem  Disease management:  the patient is not doing well with his goals  The following portions of the patient's history were reviewed and updated as appropriate: allergies, current medications, past family history, past medical history, past social history, past surgical history and problem list     Review of Systems      Complete-Male:   Constitutional: no fever,- feeling poorly,-no recent weight gain,-no chills,-not feeling tired-and-no recent weight loss  Eyes: no eyesight problems,-no dryness of the eyes,-no purulent discharge from the eyes-and-no itching of the eyes  Cardiovascular: no chest pain-and-no palpitations  Respiratory: no shortness of breath,-no cough,-no orthopnea,-no wheezing-and-no shortness of breath during exertion  Gastrointestinal: no abdominal pain,-no nausea,-no vomiting-and-no diarrhea  Genitourinary: no dysuria,-no urinary hesitancy-and-no incontinence  Musculoskeletal: no arthralgias,-no joint swelling,-no myalgias-and-no joint stiffness  Integumentary: no rashes-and-no skin lesions  Neurological: headache, but-no numbness,-no tingling,-no dizziness-and-no fainting  Psychiatric: sleep disturbances-and-sleeps well with ambien, if not he is restless  , but-not suicidal,-no anxiety-and-no depression     Hematologic/Lymphatic: no swollen glands-and-no tendency for easy bleeding    Objective:      /88 (BP Location: Left arm, Patient Position: Sitting, Cuff Size: Large)   Pulse 60   Temp 97 9 °F (36 6 °C) (Oral)   Resp 18   Ht 5' 9" (1 753 m)   Wt 95 2 kg (209 lb 12 8 oz)   SpO2 97%   BMI 30 98 kg/m²          Physical Exam      Constitutional   General appearance: No acute distress, well appearing and well nourished  Eyes   Conjunctiva and lids: No swelling, erythema, or discharge  Pupils and irises: Equal, round and reactive to light  Ears, Nose, Mouth, and Throat   External inspection of ears and nose: Normal     Oropharynx: Normal with no erythema, edema, exudate or lesions  Pulmonary   Respiratory effort: No increased work of breathing or signs of respiratory distress  Auscultation of lungs: Clear to auscultation, equal breath sounds bilaterally, no wheezes, no rales, no rhonci  Cardiovascular   Auscultation of heart: Normal rate and rhythm, normal S1 and S2, without murmurs  Examination of extremities for edema and/or varicosities: Normal     Carotid pulses: Normal     Abdomen   Abdomen: Non-tender, no masses  Liver and spleen: No hepatomegaly or splenomegaly  Lymphatic   Palpation of lymph nodes in neck: No lymphadenopathy  Musculoskeletal   Digits and nails: Normal without clubbing or cyanosis  Skin   Skin and subcutaneous tissue: Normal without rashes or lesions      Psychiatric   Orientation to person, place and time: Normal     Mood and affect: Normal

## 2018-05-15 NOTE — ASSESSMENT & PLAN NOTE
Has had full workup by Hematology last no reviewed continue with aspirin daily and has completed Coumadin

## 2018-05-15 NOTE — ASSESSMENT & PLAN NOTE
All labs reviewed as well as ultrasound  No signs of gallbladder dysfunction reviewed fatty liver and discuss weight loss and proper eating with patient    Nexium for now since is not covered by insurance and start Protonix, patient is unsure of was lidocaine allergy and states he has tolerated Novocain and Xanax without any problems

## 2018-05-15 NOTE — ASSESSMENT & PLAN NOTE
Not having full  migraines but is having intermittent headaches that still require tramadol at times

## 2018-05-15 NOTE — ASSESSMENT & PLAN NOTE
Discussed eating a proper breakfast with protein and keeping up with fluid intake not skipping meals  Okay to have   Healthy snacking throughout the day and proper dinner

## 2018-05-15 NOTE — ASSESSMENT & PLAN NOTE
Uses tramadol p r n  And is on aspirin 81 mg for previous blood clot    Contributing to headaches from time to time

## 2018-05-16 ENCOUNTER — TELEPHONE (OUTPATIENT)
Dept: INTERNAL MEDICINE CLINIC | Facility: CLINIC | Age: 43
End: 2018-05-16

## 2018-05-16 NOTE — TELEPHONE ENCOUNTER
Patient calling and stating that his tramadol 50 mg is requiring prior authorization  Uses [facility] in [place]

## 2018-05-18 ENCOUNTER — TELEPHONE (OUTPATIENT)
Dept: INTERNAL MEDICINE CLINIC | Facility: CLINIC | Age: 43
End: 2018-05-18

## 2018-05-22 NOTE — TELEPHONE ENCOUNTER
[employer] prior auth denied  Reason for denial:  Coverage for additional quantities of the requested drug in excess of a 7 day supply would be considered if the pt meets the following criteria --    -on hospice care  -has a terminal illness or end of life care  -active oncology diagnosis  -on palliative care  -short term tx of traumatic brain injury  -moderate to severe acute pain after surgery  -moderate to severe chronic pain for which alternative tx are inadequate AND all of the following requirements have been met:    >active tx plan that includes, tx objective and the use of other pharmacological and non-pharmacological agents for pain relief as appropriate    >narcotic agreement has been signed    Please review with Dr Naidne Casiano  Thank you!

## 2018-06-01 ENCOUNTER — OFFICE VISIT (OUTPATIENT)
Dept: INTERNAL MEDICINE CLINIC | Facility: CLINIC | Age: 43
End: 2018-06-01
Payer: COMMERCIAL

## 2018-06-01 VITALS
OXYGEN SATURATION: 98 % | SYSTOLIC BLOOD PRESSURE: 130 MMHG | WEIGHT: 214.2 LBS | HEART RATE: 88 BPM | TEMPERATURE: 98.1 F | BODY MASS INDEX: 31.73 KG/M2 | HEIGHT: 69 IN | DIASTOLIC BLOOD PRESSURE: 90 MMHG | RESPIRATION RATE: 20 BRPM

## 2018-06-01 DIAGNOSIS — G43.109 MIGRAINE WITH AURA AND WITHOUT STATUS MIGRAINOSUS, NOT INTRACTABLE: Primary | ICD-10-CM

## 2018-06-01 PROCEDURE — 99213 OFFICE O/P EST LOW 20 MIN: CPT | Performed by: INTERNAL MEDICINE

## 2018-06-01 NOTE — PROGRESS NOTES
Assessment/Plan:    Classic migraine    Narcotic agreement signed today  Continue with current treatment plan of tramadol for moderate migraine headaches and sumatriptan as needed for severe migraine headaches in a patient who has failed multiple treatments of migraine medications in the past  Refill of tramadol 50 mg to be taken every 6 hr as needed for moderate migraine headaches submitted today  Continue with sumatriptan as needed for severe headaches  No need to start prophylactic medications at this time  Follow-up in 3 months  Diagnoses and all orders for this visit:    Migraine with aura and without status migrainosus, not intractable          Subjective:      Patient ID: Candido Guerrier is a 38 y o  male  30-year-old male is seen today for evaluation of migraine headaches to which he has been evaluated in the past by a neurologist as well as his primary care physician  He has tried multiple medications in the past for management of migraine headaches, of which consist of Neurontin, gabapentin, Fioricet, and magnesium oxide with minimal improvement/control of migraine headaches  He is currently on tramadol as needed for moderate migraine headaches and sumatriptan as needed for severe migraine headaches  He reports getting 10-15 migraine headaches per month  PDMP reviewed today  Migraine    This is a chronic problem  The current episode started more than 1 year ago  The problem occurs intermittently  The problem has been unchanged  The pain is located in the parietal and frontal region  The pain does not radiate  The pain quality is similar to prior headaches  The quality of the pain is described as band-like, throbbing and sharp  The pain is at a severity of 10/10  The pain is severe  Associated symptoms include nausea   Pertinent negatives include no abdominal pain, abnormal behavior, anorexia, back pain, blurred vision, coughing, dizziness, drainage, ear pain, eye pain, eye redness, eye watering, facial sweating, fever, hearing loss, insomnia, loss of balance, muscle aches, neck pain, numbness, phonophobia, photophobia, rhinorrhea, scalp tenderness, seizures, sinus pressure, sore throat, swollen glands, tingling, tinnitus, visual change, vomiting, weakness or weight loss  He has tried triptans, oral narcotics, NSAIDs, darkened room and acetaminophen for the symptoms  The treatment provided moderate relief  His past medical history is significant for migraine headaches  The following portions of the patient's history were reviewed and updated as appropriate: allergies, current medications, past family history, past medical history, past social history, past surgical history and problem list     Review of Systems   Constitutional: Negative  Negative for chills, fatigue, fever and weight loss  HENT: Negative for congestion, ear pain, hearing loss, postnasal drip, rhinorrhea, sinus pressure, sore throat and tinnitus  Eyes: Negative  Negative for blurred vision, photophobia, pain and redness  Respiratory: Negative for cough, chest tightness, shortness of breath and wheezing  Cardiovascular: Negative for chest pain and palpitations  Gastrointestinal: Positive for nausea  Negative for abdominal distention, abdominal pain, anorexia, blood in stool, constipation, diarrhea and vomiting  Endocrine: Negative  Genitourinary: Negative for difficulty urinating, dysuria and hematuria  Musculoskeletal: Negative  Negative for back pain and neck pain  Skin: Negative  Allergic/Immunologic: Negative for environmental allergies and food allergies  Neurological: Positive for headaches  Negative for dizziness, tingling, seizures, weakness, numbness and loss of balance  Hematological: Negative for adenopathy  Psychiatric/Behavioral: Negative for agitation, behavioral problems, confusion and sleep disturbance  The patient does not have insomnia            Past Medical History: Diagnosis Date    Accelerated essential hypertension     Colitis     DVT (deep venous thrombosis) (HCC)     Elevated ALT measurement     Headache     Holter monitor, abnormal     PE (pulmonary thromboembolism) (HCC)     Syncope          Current Outpatient Prescriptions:     ALPRAZolam (XANAX) 0 5 mg tablet, Take 1 tablet (0 5 mg total) by mouth daily as needed for anxiety, Disp: 30 tablet, Rfl: 3    cyclobenzaprine (FLEXERIL) 10 mg tablet, Take 1 tablet (10 mg total) by mouth 3 (three) times a day as needed for muscle spasms, Disp: 30 tablet, Rfl: 1    pantoprazole (PROTONIX) 40 mg tablet, Take 1 tablet (40 mg total) by mouth daily, Disp: 30 tablet, Rfl: 5    SUMAtriptan (IMITREX) 25 mg tablet, Take 1 tablet by mouth as needed  , Disp: , Rfl:     traMADol (ULTRAM) 50 mg tablet, Take 1 tablet (50 mg total) by mouth every 6 (six) hours as needed (as needed), Disp: 60 tablet, Rfl: 3    venlafaxine (EFFEXOR-XR) 150 mg 24 hr capsule, Take 2 capsules (300 mg total) by mouth daily, Disp: 30 capsule, Rfl: 5    zolpidem (AMBIEN) 10 mg tablet, Take 1 tablet (10 mg total) by mouth daily at bedtime as needed for sleep, Disp: 30 tablet, Rfl: 3    albuterol (PROAIR HFA) 90 mcg/act inhaler, Inhale 2 puffs, Disp: , Rfl:     aspirin 81 MG tablet, Take 1 tablet by mouth daily, Disp: , Rfl:     fluticasone (FLONASE) 50 mcg/act nasal spray, 2 sprays into each nostril daily, Disp: , Rfl:     Allergies   Allergen Reactions    Lidocaine Anaphylaxis    Lidocaine Hcl        Social History   Past Surgical History:   Procedure Laterality Date    HERNIA REPAIR Right     groin    KNEE ARTHROSCOPY Left     x2    KNEE SURGERY  10/2015    SHOULDER SURGERY Left     3x    VASECTOMY       Family History   Problem Relation Age of Onset   Leisa Larose Migraines Mother     Diabetes Mother     Hypertension Father     Diabetes Father     Cancer Sister     Cancer Brother     Hypertension Brother     Liver disease Brother      fatty liver    Cancer Maternal Grandfather     Other Maternal Grandfather      history of travel    Hypertension Paternal Grandmother     Diabetes Paternal Grandmother     Hypertension Paternal Grandfather     Cancer Other     Cancer Maternal Aunt        Objective:  /90 (BP Location: Left arm, Patient Position: Sitting, Cuff Size: Large)   Pulse 88   Temp 98 1 °F (36 7 °C) (Oral)   Resp 20   Ht 5' 9" (1 753 m)   Wt 97 2 kg (214 lb 3 2 oz)   SpO2 98%   BMI 31 63 kg/m²     Recent Results (from the past 1344 hour(s))   Lipase    Collection Time: 04/13/18  2:20 PM   Result Value Ref Range    Lipase, Serum 13 7 - 60 U/L   Amylase    Collection Time: 04/13/18  2:20 PM   Result Value Ref Range    SL AMB AMYLASE, SERUM 45 21 - 101 U/L   Comprehensive metabolic panel    Collection Time: 04/13/18  2:20 PM   Result Value Ref Range    SL AMB GLUCOSE 96 65 - 99 mg/dL    BUN 10 7 - 25 mg/dL    Creatinine, Serum 0 95 0 60 - 1 35 mg/dL    eGFR Non  100 > OR = 60 mL/min/1 73m2    SL AMB EGFR  116 > OR = 60 mL/min/1 73m2    SL AMB BUN/CREATININE RATIO NOT APPLICABLE 6 - 22 (calc)    SL AMB SODIUM 142 135 - 146 mmol/L    SL AMB POTASSIUM 4 3 3 5 - 5 3 mmol/L    SL AMB CHLORIDE 102 98 - 110 mmol/L    SL AMB CARBON DIOXIDE 26 20 - 31 mmol/L    SL AMB CALCIUM 10 0 8 6 - 10 3 mg/dL    SL AMB PROTEIN, TOTAL 7 3 6 1 - 8 1 g/dL    Serum Albumin 5 0 3 6 - 5 1 g/dL    SL AMB GLOBULIN 2 3 1 9 - 3 7 g/dL (calc)    SL AMB ALBUMIN/GLOBULIN RATIO 2 2 1 0 - 2 5 (calc)    SL AMB BILIRUBIN, TOTAL 0 5 0 2 - 1 2 mg/dL    SL AMB ALKALINE PHOSPHATASE 93 40 - 115 U/L    SL AMB AST 32 10 - 40 U/L    SL AMB ALT 62 (H) 9 - 46 U/L   CBC and differential    Collection Time: 04/13/18  2:20 PM   Result Value Ref Range    SL AMB LAB WHITE BLOOD CELL COUNT 6 8 3 8 - 10 8 Thousand/uL    SL AMB LAB RED BLOOD CELLS 4 98 4 20 - 5 80 Million/uL    Hemoglobin 15 9 13 2 - 17 1 g/dL    Hematocrit 46 0 38 5 - 50 0 %    MCV 92 4 80 0 - 100 0 fL    MCH 31 9 27 0 - 33 0 pg    MCHC 34 6 32 0 - 36 0 g/dL    RDW 12 3 11 0 - 15 0 %    Platelet Count 261 934 - 400 Thousand/uL    SL AMB MPV 10 4 7 5 - 12 5 fL    Neutrophils (Absolute) 2,978 1,500 - 7,800 cells/uL    Lymphocytes (Absolute) 3,142 850 - 3,900 cells/uL    Monocytes (Absolute) 592 200 - 950 cells/uL    Eosinophils (Absolute) 48 15 - 500 cells/uL    Basophils (Absolute) 41 0 - 200 cells/uL    Neutrophils 43 8 %    Lymphocytes 46 2 %    Monocytes 8 7 %    Eosinophils 0 7 %    Basophils 0 6 %            Physical Exam   Constitutional: He is oriented to person, place, and time  He appears well-developed and well-nourished  No distress  HENT:   Head: Normocephalic and atraumatic  Eyes: Conjunctivae and EOM are normal  Pupils are equal, round, and reactive to light  Right eye exhibits no discharge  Left eye exhibits no discharge  No scleral icterus  Neck: Normal range of motion  Neck supple  No JVD present  No thyromegaly present  Cardiovascular: Normal rate, regular rhythm, normal heart sounds and intact distal pulses  Exam reveals no gallop and no friction rub  No murmur heard  Pulmonary/Chest: Effort normal and breath sounds normal  No respiratory distress  He has no wheezes  He has no rales  He exhibits no tenderness  Abdominal: Soft  Bowel sounds are normal  He exhibits no distension and no mass  There is no tenderness  There is no rebound and no guarding  Musculoskeletal: Normal range of motion  He exhibits no edema, tenderness or deformity  Lymphadenopathy:     He has no cervical adenopathy  Neurological: He is alert and oriented to person, place, and time  He has normal reflexes  No cranial nerve deficit  Coordination normal    Skin: Skin is warm and dry  No rash noted  He is not diaphoretic  No erythema  No pallor  Psychiatric: He has a normal mood and affect  His behavior is normal  Judgment and thought content normal    Nursing note and vitals reviewed

## 2018-06-01 NOTE — ASSESSMENT & PLAN NOTE
Narcotic agreement signed today  Continue with current treatment plan of tramadol for moderate migraine headaches and sumatriptan as needed for severe migraine headaches in a patient who has failed multiple treatments of migraine medications in the past  Refill of tramadol 50 mg to be taken every 6 hr as needed for moderate migraine headaches submitted today  Continue with sumatriptan as needed for severe headaches  No need to start prophylactic medications at this time  Follow-up in 3 months

## 2018-06-06 ENCOUNTER — TELEPHONE (OUTPATIENT)
Dept: INTERNAL MEDICINE CLINIC | Facility: CLINIC | Age: 43
End: 2018-06-06

## 2018-09-04 ENCOUNTER — OFFICE VISIT (OUTPATIENT)
Dept: INTERNAL MEDICINE CLINIC | Facility: CLINIC | Age: 43
End: 2018-09-04
Payer: COMMERCIAL

## 2018-09-04 VITALS
SYSTOLIC BLOOD PRESSURE: 140 MMHG | DIASTOLIC BLOOD PRESSURE: 92 MMHG | HEIGHT: 68 IN | BODY MASS INDEX: 31.07 KG/M2 | WEIGHT: 205 LBS | OXYGEN SATURATION: 98 % | TEMPERATURE: 98.3 F | HEART RATE: 78 BPM

## 2018-09-04 DIAGNOSIS — F51.01 PRIMARY INSOMNIA: ICD-10-CM

## 2018-09-04 DIAGNOSIS — R07.89 CHEST PRESSURE: ICD-10-CM

## 2018-09-04 DIAGNOSIS — I10 ESSENTIAL HYPERTENSION: Primary | ICD-10-CM

## 2018-09-04 DIAGNOSIS — R42 DIZZINESS: ICD-10-CM

## 2018-09-04 PROCEDURE — 93000 ELECTROCARDIOGRAM COMPLETE: CPT | Performed by: NURSE PRACTITIONER

## 2018-09-04 PROCEDURE — 99214 OFFICE O/P EST MOD 30 MIN: CPT | Performed by: NURSE PRACTITIONER

## 2018-09-04 PROCEDURE — 36415 COLL VENOUS BLD VENIPUNCTURE: CPT | Performed by: NURSE PRACTITIONER

## 2018-09-04 RX ORDER — FLUTICASONE PROPIONATE 50 MCG
2 SPRAY, SUSPENSION (ML) NASAL DAILY
Qty: 16 G | Refills: 0 | Status: SHIPPED | OUTPATIENT
Start: 2018-09-04 | End: 2019-01-08 | Stop reason: ALTCHOICE

## 2018-09-04 NOTE — LETTER
September 4, 2018     Patient: Xi Rehman   YOB: 1975   Date of Visit: 9/4/2018       To Whom it May Concern:    Bonnie Escalante is under my professional care  He was seen in my office on 9/4/2018  He may return to work on 9/5/18  If you have any questions or concerns, please don't hesitate to call           Sincerely,          ANDREW Grey        CC: No Recipients

## 2018-09-04 NOTE — ASSESSMENT & PLAN NOTE
Reviewed EKG with Dr Karmen Gallagher,  EKG with normal sinus rhythm  Will get CBC CMP lipid panel and D-dimer, as patient has history of DVT and PE  Patient is to keep his follow-up with our office on 09/11/2018  Discussed signs and symptoms of stroke with patient when he should report to the emergency room  Discussed red flag symptoms with patient  And when he should report to the ER

## 2018-09-04 NOTE — ASSESSMENT & PLAN NOTE
Encouraged patient to continue to stay well hydrated, and to use his Flonase 2 sprays each nostril daily as it appeared that patient had minimal effusions behind his ear, with no infection  For the patient's dizziness and lightheadedness may be correlated to patient missing 1 of his Effexor doses  Will get blood work and have patient keep his follow-up on 9/11/18

## 2018-09-04 NOTE — PROGRESS NOTES
Assessment/Plan:    Chest pressure    Reviewed EKG with Dr Rashi Barillas,  EKG with normal sinus rhythm  Will get CBC CMP lipid panel and D-dimer, as patient has history of DVT and PE  Patient is to keep his follow-up with our office on 09/11/2018  Discussed signs and symptoms of stroke with patient when he should report to the emergency room  Discussed red flag symptoms with patient  And when he should report to the ER  Dizziness   Encouraged patient to continue to stay well hydrated, and to use his Flonase 2 sprays each nostril daily as it appeared that patient had minimal effusions behind his ear, with no infection  For the patient's dizziness and lightheadedness may be correlated to patient missing 1 of his Effexor doses  Will get blood work and have patient keep his follow-up on 9/11/18  Diagnoses and all orders for this visit:    Essential hypertension  -     Comprehensive metabolic panel; Future  -     CBC and differential  -     Lipid panel    Dizziness  -     D-dimer, quantitative; Future  -     fluticasone (FLONASE) 50 mcg/act nasal spray; 2 sprays into each nostril daily    Chest pressure          Subjective:      Patient ID: Jason Hagan is a 41 y o  male  Patient presents today with complaints of dizziness and headaches  Patient had to work early on Thursday, and forgot to take his Effexor on Friday  He woke up on Saturday with complaints of lightheadedness, blurry vision and he felt sick  He states that his symptoms are slowly improving from Saturday  He states that his vision is completely back to normal   Patient also states that he had chest pain on Saturday, with mild chest discomfort continuing today  Of note patient does have history of PE and history of DVT of the left leg  Patient does also have complaints of a mild headache, which she does have history of migraines  He states that this headache is no worse than his normal headache        Dizziness   This is a new problem  Episode onset: Saturday  The problem occurs intermittently  The problem has been gradually improving  Associated symptoms include chest pain (saturday he had chest pain, he reports his chest feels tight), headaches and nausea  Pertinent negatives include no abdominal pain, arthralgias, chills, congestion, coughing, diaphoresis, fatigue, fever, neck pain, numbness, rash, sore throat, urinary symptoms, visual change, vomiting or weakness  Nothing aggravates the symptoms  He has tried nothing (has been resting) for the symptoms  The treatment provided no relief  Headache    This is a chronic problem  Episode onset: since saturday  The problem occurs intermittently  The problem has been waxing and waning  The pain is located in the frontal region  The pain does not radiate  The pain quality is similar to prior headaches  The quality of the pain is described as aching  Associated symptoms include blurred vision, dizziness and nausea  Pertinent negatives include no abdominal pain, back pain, coughing, ear pain, eye pain, fever, loss of balance, muscle aches, neck pain, numbness, rhinorrhea, sinus pressure, sore throat, visual change, vomiting or weakness  Nothing aggravates the symptoms  Treatments tried: tramadol, sumatriptan  The treatment provided mild relief  The following portions of the patient's history were reviewed and updated as appropriate: allergies, current medications, past family history, past medical history, past social history, past surgical history and problem list     Review of Systems   Constitutional: Negative for activity change, appetite change, chills, diaphoresis, fatigue and fever  HENT: Negative for congestion, ear discharge, ear pain, postnasal drip, rhinorrhea, sinus pain, sinus pressure and sore throat  Eyes: Positive for blurred vision and visual disturbance  Negative for pain, discharge and itching     Respiratory: Negative for cough, chest tightness, shortness of breath and wheezing  Cardiovascular: Positive for chest pain (saturday he had chest pain, he reports his chest feels tight)  Negative for palpitations and leg swelling  Gastrointestinal: Positive for nausea  Negative for abdominal pain, blood in stool, constipation, diarrhea and vomiting  Endocrine: Negative for polydipsia, polyphagia and polyuria  Genitourinary: Negative for difficulty urinating, dysuria, hematuria and urgency  Musculoskeletal: Negative for arthralgias, back pain and neck pain  Skin: Negative for rash and wound  Neurological: Positive for dizziness and headaches  Negative for weakness, numbness and loss of balance           Past Medical History:   Diagnosis Date    Accelerated essential hypertension     Colitis     DVT (deep venous thrombosis) ([facility])     Elevated ALT measurement     Headache     Holter monitor, abnormal     PE (pulmonary thromboembolism) ([facility])     Syncope          Current Outpatient Prescriptions:     albuterol (PROAIR HFA) 90 mcg/act inhaler, Inhale 2 puffs, Disp: , Rfl:     ALPRAZolam (XANAX) 0 5 mg tablet, Take 1 tablet (0 5 mg total) by mouth daily as needed for anxiety, Disp: 30 tablet, Rfl: 3    cyclobenzaprine (FLEXERIL) 10 mg tablet, Take 1 tablet (10 mg total) by mouth 3 (three) times a day as needed for muscle spasms, Disp: 30 tablet, Rfl: 1    fluticasone (FLONASE) 50 mcg/act nasal spray, 2 sprays into each nostril daily, Disp: 16 g, Rfl: 0    pantoprazole (PROTONIX) 40 mg tablet, Take 1 tablet (40 mg total) by mouth daily, Disp: 30 tablet, Rfl: 5    SUMAtriptan (IMITREX) 25 mg tablet, Take 1 tablet by mouth as needed  , Disp: , Rfl:     traMADol (ULTRAM) 50 mg tablet, Take 1 tablet (50 mg total) by mouth every 6 (six) hours as needed (as needed), Disp: 60 tablet, Rfl: 3    venlafaxine (EFFEXOR-XR) 150 mg 24 hr capsule, Take 2 capsules (300 mg total) by mouth daily, Disp: 30 capsule, Rfl: 5    zolpidem (AMBIEN) 10 mg tablet, Take 1 tablet (10 mg total) by mouth daily at bedtime as needed for sleep, Disp: 30 tablet, Rfl: 3    Allergies   Allergen Reactions    Lidocaine Anaphylaxis    Lidocaine Hcl        Social History   Past Surgical History:   Procedure Laterality Date    HERNIA REPAIR Right     groin    KNEE ARTHROSCOPY Left     x2    KNEE SURGERY  10/2015    SHOULDER SURGERY Left     3x    VASECTOMY       Family History   Problem Relation Age of Onset    Migraines Mother     Diabetes Mother     Hypertension Father     Diabetes Father     Cancer Sister     Cancer Brother     Hypertension Brother     Liver disease Brother         fatty liver    Cancer Maternal Grandfather     Other Maternal Grandfather         history of travel    Hypertension Paternal Grandmother     Diabetes Paternal Grandmother     Hypertension Paternal Grandfather     Cancer Other     Cancer Maternal Aunt        Objective:  /92 (BP Location: Left arm, Patient Position: Sitting, Cuff Size: Large)   Pulse 78   Temp 98 3 °F (36 8 °C) (Oral)   Ht 5' 7 5" (1 715 m)   Wt 93 kg (205 lb)   SpO2 98%   BMI 31 63 kg/m²     No results found for this or any previous visit (from the past 1344 hour(s))  Physical Exam   Constitutional: He is oriented to person, place, and time  He appears well-developed and well-nourished  No distress  HENT:   Head: Normocephalic and atraumatic  Right Ear: External ear normal    Left Ear: External ear normal    Nose: Nose normal    Mouth/Throat: Oropharynx is clear and moist  No oropharyngeal exudate  Eyes: Conjunctivae and EOM are normal  Pupils are equal, round, and reactive to light  Right eye exhibits no discharge  Left eye exhibits no discharge  Neck: Normal range of motion  Neck supple  No thyromegaly present  Cardiovascular: Normal rate, regular rhythm, normal heart sounds and intact distal pulses  Exam reveals no gallop and no friction rub  No murmur heard    Pulmonary/Chest: Effort normal and breath sounds normal  No stridor  No respiratory distress  He has no wheezes  He has no rales  Abdominal: Soft  Bowel sounds are normal  He exhibits no distension  There is no tenderness  Lymphadenopathy:     He has no cervical adenopathy  Neurological: He is alert and oriented to person, place, and time  No cranial nerve deficit  Skin: Skin is warm and dry  No rash noted  He is not diaphoretic  No erythema  Psychiatric: He has a normal mood and affect   His behavior is normal  Judgment and thought content normal

## 2018-09-05 LAB
ALBUMIN SERPL-MCNC: 4.6 G/DL (ref 3.6–5.1)
ALBUMIN/GLOB SERPL: 2.1 (CALC) (ref 1–2.5)
ALP SERPL-CCNC: 83 U/L (ref 40–115)
ALT SERPL-CCNC: 43 U/L (ref 9–46)
AST SERPL-CCNC: 27 U/L (ref 10–40)
BASOPHILS # BLD AUTO: 41 CELLS/UL (ref 0–200)
BASOPHILS NFR BLD AUTO: 0.8 %
BILIRUB SERPL-MCNC: 0.5 MG/DL (ref 0.2–1.2)
BUN SERPL-MCNC: 8 MG/DL (ref 7–25)
BUN/CREAT SERPL: NORMAL (CALC) (ref 6–22)
CALCIUM SERPL-MCNC: 9.7 MG/DL (ref 8.6–10.3)
CHLORIDE SERPL-SCNC: 104 MMOL/L (ref 98–110)
CHOLEST SERPL-MCNC: 171 MG/DL
CHOLEST/HDLC SERPL: 3.2 (CALC)
CO2 SERPL-SCNC: 30 MMOL/L (ref 20–32)
CREAT SERPL-MCNC: 0.95 MG/DL (ref 0.6–1.35)
D DIMER PPP FEU-MCNC: 0.33 MCG/ML FEU
D DIMER PPP FEU-MCNC: NORMAL MCG/ML FEU
EOSINOPHIL # BLD AUTO: 41 CELLS/UL (ref 15–500)
EOSINOPHIL NFR BLD AUTO: 0.8 %
ERYTHROCYTE [DISTWIDTH] IN BLOOD BY AUTOMATED COUNT: 12.4 % (ref 11–15)
GLOBULIN SER CALC-MCNC: 2.2 G/DL (CALC) (ref 1.9–3.7)
GLUCOSE SERPL-MCNC: 89 MG/DL (ref 65–99)
HCT VFR BLD AUTO: 46.8 % (ref 38.5–50)
HDLC SERPL-MCNC: 53 MG/DL
HGB BLD-MCNC: 15.8 G/DL (ref 13.2–17.1)
LDLC SERPL CALC-MCNC: 104 MG/DL (CALC)
LYMPHOCYTES # BLD AUTO: 2438 CELLS/UL (ref 850–3900)
LYMPHOCYTES NFR BLD AUTO: 47.8 %
MCH RBC QN AUTO: 31.7 PG (ref 27–33)
MCHC RBC AUTO-ENTMCNC: 33.8 G/DL (ref 32–36)
MCV RBC AUTO: 94 FL (ref 80–100)
MONOCYTES # BLD AUTO: 459 CELLS/UL (ref 200–950)
MONOCYTES NFR BLD AUTO: 9 %
NEUTROPHILS # BLD AUTO: 2122 CELLS/UL (ref 1500–7800)
NEUTROPHILS NFR BLD AUTO: 41.6 %
NONHDLC SERPL-MCNC: 118 MG/DL (CALC)
PLATELET # BLD AUTO: 281 THOUSAND/UL (ref 140–400)
PMV BLD REES-ECKER: 10.5 FL (ref 7.5–12.5)
POTASSIUM SERPL-SCNC: 4.4 MMOL/L (ref 3.5–5.3)
PROT SERPL-MCNC: 6.8 G/DL (ref 6.1–8.1)
RBC # BLD AUTO: 4.98 MILLION/UL (ref 4.2–5.8)
SL AMB EGFR AFRICAN AMERICAN: 115 ML/MIN/1.73M2
SL AMB EGFR NON AFRICAN AMERICAN: 99 ML/MIN/1.73M2
SODIUM SERPL-SCNC: 144 MMOL/L (ref 135–146)
TRIGL SERPL-MCNC: 54 MG/DL
WBC # BLD AUTO: 5.1 THOUSAND/UL (ref 3.8–10.8)

## 2018-09-05 RX ORDER — ZOLPIDEM TARTRATE 10 MG/1
TABLET ORAL
Qty: 30 TABLET | OUTPATIENT
Start: 2018-09-05

## 2018-09-11 ENCOUNTER — OFFICE VISIT (OUTPATIENT)
Dept: INTERNAL MEDICINE CLINIC | Facility: CLINIC | Age: 43
End: 2018-09-11
Payer: COMMERCIAL

## 2018-09-11 VITALS
TEMPERATURE: 98.3 F | HEART RATE: 84 BPM | OXYGEN SATURATION: 98 % | DIASTOLIC BLOOD PRESSURE: 90 MMHG | SYSTOLIC BLOOD PRESSURE: 140 MMHG | WEIGHT: 204.8 LBS | HEIGHT: 68 IN | BODY MASS INDEX: 31.04 KG/M2

## 2018-09-11 DIAGNOSIS — M62.838 MUSCLE SPASM: ICD-10-CM

## 2018-09-11 DIAGNOSIS — G43.109 MIGRAINE WITH AURA AND WITHOUT STATUS MIGRAINOSUS, NOT INTRACTABLE: ICD-10-CM

## 2018-09-11 DIAGNOSIS — K21.9 GASTROESOPHAGEAL REFLUX DISEASE WITHOUT ESOPHAGITIS: Primary | ICD-10-CM

## 2018-09-11 DIAGNOSIS — E78.00 HYPERCHOLESTEREMIA: ICD-10-CM

## 2018-09-11 DIAGNOSIS — I10 ESSENTIAL HYPERTENSION: ICD-10-CM

## 2018-09-11 DIAGNOSIS — E55.9 VITAMIN D DEFICIENCY: ICD-10-CM

## 2018-09-11 DIAGNOSIS — F51.04 CHRONIC INSOMNIA: ICD-10-CM

## 2018-09-11 PROBLEM — E63.9 POOR EATING HABITS: Status: RESOLVED | Noted: 2018-05-15 | Resolved: 2018-09-11

## 2018-09-11 PROBLEM — R07.89 CHEST PRESSURE: Status: RESOLVED | Noted: 2018-09-04 | Resolved: 2018-09-11

## 2018-09-11 PROBLEM — R42 DIZZINESS: Status: RESOLVED | Noted: 2018-09-04 | Resolved: 2018-09-11

## 2018-09-11 PROCEDURE — 99214 OFFICE O/P EST MOD 30 MIN: CPT | Performed by: FAMILY MEDICINE

## 2018-09-11 PROCEDURE — 1036F TOBACCO NON-USER: CPT | Performed by: FAMILY MEDICINE

## 2018-09-11 RX ORDER — HYDROCHLOROTHIAZIDE 12.5 MG/1
12.5 TABLET ORAL DAILY
Qty: 30 TABLET | Refills: 5 | Status: SHIPPED | OUTPATIENT
Start: 2018-09-11 | End: 2019-01-08 | Stop reason: SDUPTHER

## 2018-09-11 RX ORDER — ERGOCALCIFEROL 1.25 MG/1
50000 CAPSULE ORAL WEEKLY
Qty: 12 CAPSULE | Refills: 0 | Status: SHIPPED | OUTPATIENT
Start: 2018-09-11 | End: 2019-01-08 | Stop reason: SDUPTHER

## 2018-09-11 NOTE — PROGRESS NOTES
Assessment/Plan:    No problem-specific Assessment & Plan notes found for this encounter  Problem List Items Addressed This Visit        Digestive    Acid reflux - Primary       Cardiovascular and Mediastinum    Classic migraine    Essential hypertension    Relevant Medications    hydrochlorothiazide (HYDRODIURIL) 12 5 mg tablet       Other    Chronic insomnia    Vitamin D deficiency    Relevant Medications    ergocalciferol (VITAMIN D2) 50,000 units    Other Relevant Orders    Vitamin D 25 hydroxy    Muscle spasm      Other Visit Diagnoses     Hypercholesteremia        Relevant Orders    Lipid panel    Comprehensive metabolic panel            Discussion, no changes to medications  Laboratory data reviewed and rate improvement to cholesterol panel  Recommend massaged to bilateral neck and shoulder areas and discussed stretching and proper posture  I feel that his muscular hypertonicity is contributing to his headaches  Heat to the area as tolerated  No changes in Effexor since he feels that it is working well  Add low-dose hydrochlorothiazide for elevated diastolic blood pressure  Check blood pressure at home  Replete vitamin-D with 66917 U weekly and recheck for November so appointment  Patient understands and agrees  Patient completed  Controlled substance agreement in June    Subjective:  F/up lipids     Patient ID: Jesenia Pruett is a 41 y o  male  HPI  Generalized Anxiety Disorder (Brief): The patient is being seen for a routine clinic follow-up of anxiety disorder  Symptoms:  no palpitations,-no chest discomfort,-no trouble breathing,-no trembling,-no paresthesias,-no lightheadedness,-no nausea,-no abdominal discomfort,-no excessive sweating,-no hot flashes,-no racing thoughts,-no excessive worrying,-no fear of dying,-no fear of losing control,-no flashbacks,-no repetitive behaviors-and-no sleep disturbance  The patient is currently asymptomatic   Associated symptoms:  no poor concentration,-no memory impairment,-no avoidance of physical exertion,-no irritability,-no agitation,-no hostility-and-no depression symptoms  Suicidal risk:  no suicidal thoughts  Homicidal risk:  no homicidal thoughts  Current treatment includes selective serotonin-norepinephrine reuptake inhibitors and benzodiazepines  There are no medication side effects  (taking xanax a few times per months, uses ambien at night 2-3 times per week  feels well  ) 9/11/18: update: feels that effexor is working well  Has not really needed his Xanax      Headache    This is a chronic problem  Episode onset: since saturday  The problem occurs intermittently  The problem has been waxing and waning  The pain is located in the frontal region  The pain does not radiate  The pain quality is similar to prior headaches  The quality of the pain is described as aching  Associated symptoms include blurred vision, dizziness and nausea  Pertinent negatives include no abdominal pain, back pain, coughing, ear pain, eye pain, fever, loss of balance, muscle aches, neck pain, numbness, rhinorrhea, sinus pressure, sore throat, visual change, vomiting or weakness  Nothing aggravates the symptoms  Treatments tried: tramadol, sumatriptan  The treatment provided mild relief  9/11/18: has had complete w/up by neuro   Better with improved hydration,  Has not missed effexor, HA is now 2-3- frontal locationa nd has not hcanged or radiated       The following portions of the patient's history were reviewed and updated as appropriate: allergies, current medications, past family history, past medical history, past social history, past surgical history and problem list     Review of Systems      Constitutional:  Denies fever or chills   Eyes:  Denies change in visual acuity   HENT:  Denies nasal congestion or sore throat   Respiratory:  Denies cough or shortness of breath or wheezing  Cardiovascular:  Denies palpitations or chest pain  GI:  Denies abdominal pain, nausea, or vomiting  Integument:  Denies rash   Neurologic:  Denies  focal weakness,  See HPI        Objective:      /90 (BP Location: Right arm, Patient Position: Sitting, Cuff Size: Adult)   Pulse 84   Temp 98 3 °F (36 8 °C) (Oral)   Ht 5' 7 5" (1 715 m)   Wt 92 9 kg (204 lb 12 8 oz)   SpO2 98%   BMI 31 60 kg/m²          Physical Exam      Constitutional:  Well developed, well nourished, no acute distress, non-toxic appearance   Eyes:  PERRL, conjunctiva normal , non icteric sclera  HENT:  Atraumatic, oropharynx moist  Neck-  supple   Respiratory:  CTA b/l, normal breath sounds, no rales, no wheezing   Cardiovascular:  RRR, no murmurs, no LE edema b/l  GI:  Soft, nondistended, normal bowel sounds x 4, nontender, no organomegaly, no mass, no rebound, no guarding   Neurologic:  no focal deficits noted   Psychiatric:  Speech and behavior appropriate , AAO x 3  MS: b/l  trapezious hypertonicity, anterior rolling of shoulders b/l, decreased ROM  L shoulder due to prior surgery , medial scapular muscular pain TTP, no bony pain

## 2018-10-29 DIAGNOSIS — F41.9 ANXIETY: ICD-10-CM

## 2018-10-29 NOTE — TELEPHONE ENCOUNTER
Pt call because he needs 2 more refill for venlafaxine (EFFEXOR-XR) 150 mg 24 hr capsule   Send to [facility] in [place]

## 2018-10-30 RX ORDER — VENLAFAXINE HYDROCHLORIDE 150 MG/1
300 CAPSULE, EXTENDED RELEASE ORAL DAILY
Qty: 60 CAPSULE | Refills: 0 | Status: SHIPPED | OUTPATIENT
Start: 2018-10-30 | End: 2018-11-27 | Stop reason: SDUPTHER

## 2018-11-25 DIAGNOSIS — F41.9 ANXIETY: ICD-10-CM

## 2018-11-25 RX ORDER — VENLAFAXINE HYDROCHLORIDE 150 MG/1
300 CAPSULE, EXTENDED RELEASE ORAL DAILY
Qty: 60 CAPSULE | Refills: 0 | OUTPATIENT
Start: 2018-11-25

## 2018-11-27 DIAGNOSIS — F41.9 ANXIETY: ICD-10-CM

## 2018-11-27 RX ORDER — VENLAFAXINE HYDROCHLORIDE 150 MG/1
300 CAPSULE, EXTENDED RELEASE ORAL DAILY
Qty: 180 CAPSULE | Refills: 1 | Status: SHIPPED | OUTPATIENT
Start: 2018-11-27 | End: 2019-01-08 | Stop reason: SDUPTHER

## 2018-11-27 NOTE — TELEPHONE ENCOUNTER
Last O/V: 09/11/18  Next O/V:  No upcoming appt due for follow up 12/11/18    Please call to make appt for when due

## 2018-12-31 ENCOUNTER — CLINICAL SUPPORT (OUTPATIENT)
Dept: INTERNAL MEDICINE CLINIC | Facility: CLINIC | Age: 43
End: 2018-12-31
Payer: COMMERCIAL

## 2018-12-31 DIAGNOSIS — K76.0 FATTY LIVER: ICD-10-CM

## 2018-12-31 DIAGNOSIS — E55.9 VITAMIN D DEFICIENCY: Primary | ICD-10-CM

## 2018-12-31 PROCEDURE — 36415 COLL VENOUS BLD VENIPUNCTURE: CPT

## 2019-01-01 LAB — 25(OH)D3 SERPL-MCNC: 28 NG/ML (ref 30–100)

## 2019-01-02 LAB
HBV SURFACE AB SERPL IA-ACNC: 166 MIU/ML
HCV AB S/CO SERPL IA: 0
HCV AB SERPL QL IA: NORMAL

## 2019-01-08 ENCOUNTER — OFFICE VISIT (OUTPATIENT)
Dept: INTERNAL MEDICINE CLINIC | Facility: CLINIC | Age: 44
End: 2019-01-08
Payer: COMMERCIAL

## 2019-01-08 VITALS
DIASTOLIC BLOOD PRESSURE: 86 MMHG | BODY MASS INDEX: 31.82 KG/M2 | TEMPERATURE: 98.8 F | HEART RATE: 94 BPM | OXYGEN SATURATION: 98 % | WEIGHT: 206.2 LBS | SYSTOLIC BLOOD PRESSURE: 120 MMHG

## 2019-01-08 DIAGNOSIS — S62.339D CLOSED BOXER'S FRACTURE WITH ROUTINE HEALING, SUBSEQUENT ENCOUNTER: ICD-10-CM

## 2019-01-08 DIAGNOSIS — F41.1 GENERALIZED ANXIETY DISORDER: ICD-10-CM

## 2019-01-08 DIAGNOSIS — E55.9 VITAMIN D DEFICIENCY: ICD-10-CM

## 2019-01-08 DIAGNOSIS — F51.04 CHRONIC INSOMNIA: Primary | ICD-10-CM

## 2019-01-08 DIAGNOSIS — K21.9 GASTROESOPHAGEAL REFLUX DISEASE WITHOUT ESOPHAGITIS: ICD-10-CM

## 2019-01-08 DIAGNOSIS — Z79.01 CHRONIC ANTICOAGULATION: ICD-10-CM

## 2019-01-08 DIAGNOSIS — I26.99 OTHER ACUTE PULMONARY EMBOLISM WITHOUT ACUTE COR PULMONALE (HCC): ICD-10-CM

## 2019-01-08 DIAGNOSIS — G47.33 OSA (OBSTRUCTIVE SLEEP APNEA): ICD-10-CM

## 2019-01-08 DIAGNOSIS — F51.01 PRIMARY INSOMNIA: ICD-10-CM

## 2019-01-08 DIAGNOSIS — R76.0 LUPUS ANTICOAGULANT POSITIVE: ICD-10-CM

## 2019-01-08 DIAGNOSIS — F41.9 ANXIETY: ICD-10-CM

## 2019-01-08 DIAGNOSIS — Z23 NEED FOR INFLUENZA VACCINATION: ICD-10-CM

## 2019-01-08 DIAGNOSIS — M62.838 MUSCLE SPASM: ICD-10-CM

## 2019-01-08 DIAGNOSIS — K76.0 HEPATIC STEATOSIS: ICD-10-CM

## 2019-01-08 DIAGNOSIS — Z20.2 STD EXPOSURE: ICD-10-CM

## 2019-01-08 DIAGNOSIS — I10 ESSENTIAL HYPERTENSION: ICD-10-CM

## 2019-01-08 DIAGNOSIS — G43.109 MIGRAINE WITH AURA AND WITHOUT STATUS MIGRAINOSUS, NOT INTRACTABLE: ICD-10-CM

## 2019-01-08 PROBLEM — S62.339A CLOSED BOXER'S FRACTURE: Status: ACTIVE | Noted: 2019-01-08

## 2019-01-08 PROBLEM — R10.9 ABDOMINAL PAIN: Status: RESOLVED | Noted: 2018-04-13 | Resolved: 2019-01-08

## 2019-01-08 PROCEDURE — 90682 RIV4 VACC RECOMBINANT DNA IM: CPT

## 2019-01-08 PROCEDURE — 3079F DIAST BP 80-89 MM HG: CPT | Performed by: FAMILY MEDICINE

## 2019-01-08 PROCEDURE — 99214 OFFICE O/P EST MOD 30 MIN: CPT | Performed by: FAMILY MEDICINE

## 2019-01-08 PROCEDURE — 3074F SYST BP LT 130 MM HG: CPT | Performed by: FAMILY MEDICINE

## 2019-01-08 PROCEDURE — 1036F TOBACCO NON-USER: CPT | Performed by: FAMILY MEDICINE

## 2019-01-08 PROCEDURE — 90471 IMMUNIZATION ADMIN: CPT

## 2019-01-08 RX ORDER — PANTOPRAZOLE SODIUM 40 MG/1
40 TABLET, DELAYED RELEASE ORAL DAILY
Qty: 30 TABLET | Refills: 5 | Status: SHIPPED | OUTPATIENT
Start: 2019-01-08 | End: 2019-08-05 | Stop reason: SDUPTHER

## 2019-01-08 RX ORDER — VENLAFAXINE HYDROCHLORIDE 150 MG/1
300 CAPSULE, EXTENDED RELEASE ORAL DAILY
Qty: 60 CAPSULE | Refills: 5 | Status: SHIPPED | OUTPATIENT
Start: 2019-01-08 | End: 2019-07-08 | Stop reason: SDUPTHER

## 2019-01-08 RX ORDER — ERGOCALCIFEROL 1.25 MG/1
50000 CAPSULE ORAL WEEKLY
Qty: 12 CAPSULE | Refills: 1 | Status: SHIPPED | OUTPATIENT
Start: 2019-01-08 | End: 2019-07-02 | Stop reason: ALTCHOICE

## 2019-01-08 RX ORDER — TRAMADOL HYDROCHLORIDE 50 MG/1
50 TABLET ORAL EVERY 6 HOURS PRN
Qty: 60 TABLET | Refills: 3 | Status: SHIPPED | OUTPATIENT
Start: 2019-01-08 | End: 2019-08-26 | Stop reason: SDUPTHER

## 2019-01-08 RX ORDER — ALPRAZOLAM 0.5 MG/1
0.5 TABLET ORAL DAILY PRN
Qty: 30 TABLET | Refills: 3 | Status: SHIPPED | OUTPATIENT
Start: 2019-01-08 | End: 2019-01-08 | Stop reason: SDUPTHER

## 2019-01-08 RX ORDER — ZOLPIDEM TARTRATE 10 MG/1
10 TABLET ORAL
Qty: 30 TABLET | Refills: 3 | Status: SHIPPED | OUTPATIENT
Start: 2019-01-08 | End: 2019-01-08 | Stop reason: SDUPTHER

## 2019-01-08 RX ORDER — HYDROCHLOROTHIAZIDE 12.5 MG/1
12.5 TABLET ORAL DAILY
Qty: 30 TABLET | Refills: 5 | Status: SHIPPED | OUTPATIENT
Start: 2019-01-08 | End: 2019-12-17 | Stop reason: SDUPTHER

## 2019-01-08 RX ORDER — ALPRAZOLAM 0.5 MG/1
0.5 TABLET ORAL DAILY PRN
Qty: 30 TABLET | Refills: 3 | Status: SHIPPED | OUTPATIENT
Start: 2019-01-08 | End: 2019-08-26 | Stop reason: SDUPTHER

## 2019-01-08 RX ORDER — ZOLPIDEM TARTRATE 10 MG/1
10 TABLET ORAL
Qty: 30 TABLET | Refills: 3 | Status: SHIPPED | OUTPATIENT
Start: 2019-01-08 | End: 2019-09-03 | Stop reason: SDUPTHER

## 2019-01-08 NOTE — PROGRESS NOTES
Assessment/Plan:    No problem-specific Assessment & Plan notes found for this encounter  Problem List Items Addressed This Visit        Digestive    Acid reflux    Relevant Medications    pantoprazole (PROTONIX) 40 mg tablet    Hepatic steatosis    Relevant Orders    Lipid panel       Respiratory    BHARGAV (obstructive sleep apnea)    Pulmonary embolism (HCC)       Cardiovascular and Mediastinum    Classic migraine    Relevant Medications    venlafaxine (EFFEXOR-XR) 150 mg 24 hr capsule    traMADol (ULTRAM) 50 mg tablet    Essential hypertension    Relevant Medications    hydrochlorothiazide (HYDRODIURIL) 12 5 mg tablet    Other Relevant Orders    Comprehensive metabolic panel       Musculoskeletal and Integument    Closed boxer's fracture       Hematopoietic and Hemostatic    Lupus anticoagulant positive       Other    Chronic anticoagulation    Chronic insomnia - Primary    Generalized anxiety disorder    Relevant Medications    venlafaxine (EFFEXOR-XR) 150 mg 24 hr capsule    ALPRAZolam (XANAX) 0 5 mg tablet    zolpidem (AMBIEN) 10 mg tablet    Vitamin D deficiency    Relevant Medications    ergocalciferol (VITAMIN D2) 50,000 units    Other Relevant Orders    Vitamin D 25 hydroxy    Muscle spasm    STD exposure    Relevant Orders    HIV 1/2 AG-AB combo    RPR    Chlamydia/GC amplified DNA by PCR      Other Visit Diagnoses     Anxiety        Relevant Medications    venlafaxine (EFFEXOR-XR) 150 mg 24 hr capsule    ALPRAZolam (XANAX) 0 5 mg tablet    Primary insomnia        Relevant Medications    zolpidem (AMBIEN) 10 mg tablet            Discussion, no changes to medications  Laboratory data reviewed and great improvement to cholesterol panel  Recommend massaged to bilateral neck and shoulder areas and discussed stretching and proper posture  I feel that his muscular hypertonicity is contributing to his headaches as well as increased stress  Heat to the area as tolerated    No changes in Effexor since he feels that it is working well  Cont with  low-dose hydrochlorothiazide for elevated diastolic blood pressure  Check blood pressure at home  Replete vitamin-D with 44590 U weekly and recheck in 6 months  Patient understands and agrees  Patient completed  Controlled substance agreement in June 2018  Refill xanax and tramadol- has been out of it for  Several months    Subjective:  F/up lipids     Patient ID: Katlyn Headley is a 41 y o  male  HPI  Generalized Anxiety Disorder (Brief): The patient is being seen for a routine clinic follow-up of anxiety disorder  Symptoms:  no palpitations,-no chest discomfort,-no trouble breathing,-no trembling,-no paresthesias,-no lightheadedness,-no nausea,-no abdominal discomfort,-no excessive sweating,-no hot flashes,-no racing thoughts,-no excessive worrying,-no fear of dying,-no fear of losing control,-no flashbacks,-no repetitive behaviors-and-no sleep disturbance  The patient is currently asymptomatic  Associated symptoms:  no poor concentration,-no memory impairment,-no avoidance of physical exertion,-no irritability,-no agitation,-no hostility-and-no depression symptoms  Suicidal risk:  no suicidal thoughts  Homicidal risk:  no homicidal thoughts  Current treatment includes selective serotonin-norepinephrine reuptake inhibitors and benzodiazepines  There are no medication side effects  (taking xanax a few times per months, uses ambien at night 2-3 times per week  feels well  ) 9/11/18: update: feels that effexor is working well  Has not really needed his Xanax jan 2019:  Has been stressful in the marriage at home and due to the stress he decided to have an extramarital affair  He is now reconciling with his wife in things are better  He has had more controlled anxiety symptoms and has been out of his Xanax and Ambien but continues to take his Effexor as directed  Prior to his increase in anxiety he was using Xanax very infrequently        Headache    This is a chronic problem  Episode onset: since saturday  The problem occurs intermittently  The problem has been waxing and waning  The pain is located in the frontal region  The pain does not radiate  The pain quality is similar to prior headaches  The quality of the pain is described as aching  Associated symptoms include blurred vision, dizziness and nausea  Pertinent negatives include no abdominal pain, back pain, coughing, ear pain, eye pain, fever, loss of balance, muscle aches, neck pain, numbness, rhinorrhea, sinus pressure, sore throat, visual change, vomiting or weakness  Nothing aggravates the symptoms  Treatments tried: tramadol, sumatriptan  The treatment provided mild relief  9/11/18: has had complete w/up by neuro  Better with improved hydration,  Has not missed effexor, HA is now 2-3- frontal locationa nd has not hcanged or radiated       Insomnia, uses Ambien occasionally throughout the week    Has been out for several weeks  Does not take it consistently    The following portions of the patient's history were reviewed and updated as appropriate: allergies, current medications, past family history, past medical history, past social history, past surgical history and problem list     Review of Systems      Constitutional:  Denies fever or chills   Eyes:  Denies change in visual acuity   HENT:  Denies nasal congestion or sore throat   Respiratory:  Denies cough or shortness of breath or wheezing  Cardiovascular:  Denies palpitations or chest pain  GI:  Denies abdominal pain, nausea, or vomiting  Integument:  Denies rash   Neurologic:  Denies  focal weakness,  See HPI  :  No urine issues no discharges no nocturia      Objective:      /86 (BP Location: Left arm, Patient Position: Sitting, Cuff Size: Standard)   Pulse 94   Temp 98 8 °F (37 1 °C) (Oral)   Wt 93 5 kg (206 lb 3 2 oz) Comment: with shoes  SpO2 98%   BMI 31 82 kg/m²          Physical Exam      Constitutional:  Well developed, well nourished, no acute distress, non-toxic appearance   Eyes:  PERRL, conjunctiva normal , non icteric sclera  HENT:  Atraumatic, oropharynx moist  Neck-  supple   Respiratory:  CTA b/l, normal breath sounds, no rales, no wheezing   Cardiovascular:  RRR, no murmurs, no LE edema b/l  GI:  Soft, nondistended, normal bowel sounds x 4, nontender, no organomegaly, no mass, no rebound, no guarding   Neurologic:  no focal deficits noted   Psychiatric:  Speech and behavior appropriate , AAO x 3  MS: b/l  trapezious hypertonicity, anterior rolling of shoulders b/l, decreased ROM  L shoulder due to prior surgery , medial scapular muscular pain TTP, no bony pain

## 2019-01-22 ENCOUNTER — CLINICAL SUPPORT (OUTPATIENT)
Dept: INTERNAL MEDICINE CLINIC | Facility: CLINIC | Age: 44
End: 2019-01-22
Payer: COMMERCIAL

## 2019-01-22 DIAGNOSIS — Z20.2 STD EXPOSURE: Primary | ICD-10-CM

## 2019-01-22 DIAGNOSIS — I10 BENIGN ESSENTIAL HTN: ICD-10-CM

## 2019-01-22 DIAGNOSIS — K76.0 HEPATIC STEATOSIS: ICD-10-CM

## 2019-01-22 DIAGNOSIS — E55.9 VITAMIN D DEFICIENCY: ICD-10-CM

## 2019-01-22 PROCEDURE — 36415 COLL VENOUS BLD VENIPUNCTURE: CPT

## 2019-01-23 LAB
25(OH)D3 SERPL-MCNC: 38 NG/ML (ref 30–100)
ALBUMIN SERPL-MCNC: 4.7 G/DL (ref 3.6–5.1)
ALBUMIN/GLOB SERPL: 2 (CALC) (ref 1–2.5)
ALP SERPL-CCNC: 77 U/L (ref 40–115)
ALT SERPL-CCNC: 28 U/L (ref 9–46)
AST SERPL-CCNC: 21 U/L (ref 10–40)
BILIRUB SERPL-MCNC: 0.7 MG/DL (ref 0.2–1.2)
BUN SERPL-MCNC: 17 MG/DL (ref 7–25)
BUN/CREAT SERPL: ABNORMAL (CALC) (ref 6–22)
C TRACH RRNA SPEC QL NAA+PROBE: NOT DETECTED
CALCIUM SERPL-MCNC: 9.4 MG/DL (ref 8.6–10.3)
CHLORIDE SERPL-SCNC: 100 MMOL/L (ref 98–110)
CHOLEST SERPL-MCNC: 177 MG/DL
CHOLEST/HDLC SERPL: 2.8 (CALC)
CO2 SERPL-SCNC: 29 MMOL/L (ref 20–32)
CREAT SERPL-MCNC: 1.17 MG/DL (ref 0.6–1.35)
GLOBULIN SER CALC-MCNC: 2.3 G/DL (CALC) (ref 1.9–3.7)
GLUCOSE SERPL-MCNC: 149 MG/DL (ref 65–99)
HDLC SERPL-MCNC: 63 MG/DL
HIV 1+2 AB+HIV1 P24 AG SERPL QL IA: NORMAL
LDLC SERPL CALC-MCNC: 100 MG/DL (CALC)
N GONORRHOEA RRNA SPEC QL NAA+PROBE: NOT DETECTED
NONHDLC SERPL-MCNC: 114 MG/DL (CALC)
POTASSIUM SERPL-SCNC: 4.2 MMOL/L (ref 3.5–5.3)
PROT SERPL-MCNC: 7 G/DL (ref 6.1–8.1)
RPR SER QL: NORMAL
SL AMB EGFR AFRICAN AMERICAN: 89 ML/MIN/1.73M2
SL AMB EGFR NON AFRICAN AMERICAN: 77 ML/MIN/1.73M2
SODIUM SERPL-SCNC: 140 MMOL/L (ref 135–146)
TRIGL SERPL-MCNC: 58 MG/DL

## 2019-01-24 ENCOUNTER — TELEPHONE (OUTPATIENT)
Dept: INTERNAL MEDICINE CLINIC | Facility: CLINIC | Age: 44
End: 2019-01-24

## 2019-01-25 ENCOUNTER — TELEPHONE (OUTPATIENT)
Dept: INTERNAL MEDICINE CLINIC | Age: 44
End: 2019-01-25

## 2019-01-25 NOTE — TELEPHONE ENCOUNTER
Dr Ellen Lopez,    Please call the patient to discuss the blood work he had done in the being of the week      Please call him back at the mobile phone

## 2019-02-19 ENCOUNTER — TELEPHONE (OUTPATIENT)
Dept: INTERNAL MEDICINE CLINIC | Facility: CLINIC | Age: 44
End: 2019-02-19

## 2019-02-19 NOTE — TELEPHONE ENCOUNTER
Patient called requesting to speak with you in regards to some questions about a medication  I tried asking what medication the question was in regards to, but he had to be quick over the phone so he was not able to tell me  He asked if you could please give him a call back  His phone number is [phone]  Thank you!

## 2019-04-07 ENCOUNTER — APPOINTMENT (EMERGENCY)
Dept: RADIOLOGY | Facility: HOSPITAL | Age: 44
DRG: 558 | End: 2019-04-07
Payer: COMMERCIAL

## 2019-04-07 ENCOUNTER — HOSPITAL ENCOUNTER (INPATIENT)
Facility: HOSPITAL | Age: 44
LOS: 2 days | Discharge: HOME | DRG: 558 | End: 2019-04-10
Attending: EMERGENCY MEDICINE | Admitting: HOSPITALIST
Payer: COMMERCIAL

## 2019-04-07 ENCOUNTER — APPOINTMENT (EMERGENCY)
Dept: NON INVASIVE DIAGNOSTICS | Facility: HOSPITAL | Age: 44
DRG: 558 | End: 2019-04-07
Payer: COMMERCIAL

## 2019-04-07 ENCOUNTER — OFFICE VISIT (OUTPATIENT)
Dept: URGENT CARE | Age: 44
End: 2019-04-07
Payer: COMMERCIAL

## 2019-04-07 VITALS
TEMPERATURE: 98.8 F | DIASTOLIC BLOOD PRESSURE: 76 MMHG | BODY MASS INDEX: 30.09 KG/M2 | OXYGEN SATURATION: 98 % | SYSTOLIC BLOOD PRESSURE: 123 MMHG | WEIGHT: 195 LBS | RESPIRATION RATE: 18 BRPM | HEART RATE: 96 BPM

## 2019-04-07 DIAGNOSIS — L03.115 CELLULITIS OF RIGHT KNEE: Primary | ICD-10-CM

## 2019-04-07 DIAGNOSIS — M79.89 CALF SWELLING: ICD-10-CM

## 2019-04-07 DIAGNOSIS — M25.469 KNEE SWELLING: Primary | ICD-10-CM

## 2019-04-07 DIAGNOSIS — M70.41 PREPATELLAR BURSITIS OF RIGHT KNEE: ICD-10-CM

## 2019-04-07 PROBLEM — Z72.0 TOBACCO ABUSE: Status: ACTIVE | Noted: 2019-04-07

## 2019-04-07 LAB
ANION GAP SERPL CALCULATED.3IONS-SCNC: 13 MMOL/L (ref 4–13)
APTT PPP: 29 SECONDS (ref 26–38)
BASOPHILS # BLD AUTO: 0.03 THOUSANDS/ΜL (ref 0–0.1)
BASOPHILS NFR BLD AUTO: 0 % (ref 0–1)
BUN SERPL-MCNC: 15 MG/DL (ref 5–25)
CALCIUM SERPL-MCNC: 9.9 MG/DL (ref 8.3–10.1)
CHLORIDE SERPL-SCNC: 99 MMOL/L (ref 100–108)
CO2 SERPL-SCNC: 28 MMOL/L (ref 21–32)
CREAT SERPL-MCNC: 1.07 MG/DL (ref 0.6–1.3)
CRP SERPL QL: 11.8 MG/L
EOSINOPHIL # BLD AUTO: 0.01 THOUSAND/ΜL (ref 0–0.61)
EOSINOPHIL NFR BLD AUTO: 0 % (ref 0–6)
ERYTHROCYTE [DISTWIDTH] IN BLOOD BY AUTOMATED COUNT: 12 % (ref 11.6–15.1)
ERYTHROCYTE [SEDIMENTATION RATE] IN BLOOD: 3 MM/HOUR (ref 0–10)
GFR SERPL CREATININE-BSD FRML MDRD: 86 ML/MIN/1.73SQ M
GLUCOSE SERPL-MCNC: 123 MG/DL (ref 65–140)
HCT VFR BLD AUTO: 44.9 % (ref 36.5–49.3)
HGB BLD-MCNC: 15.5 G/DL (ref 12–17)
IMM GRANULOCYTES # BLD AUTO: 0.05 THOUSAND/UL (ref 0–0.2)
IMM GRANULOCYTES NFR BLD AUTO: 0 % (ref 0–2)
INR PPP: 0.95 (ref 0.86–1.17)
LYMPHOCYTES # BLD AUTO: 1.77 THOUSANDS/ΜL (ref 0.6–4.47)
LYMPHOCYTES NFR BLD AUTO: 12 % (ref 14–44)
MCH RBC QN AUTO: 31.8 PG (ref 26.8–34.3)
MCHC RBC AUTO-ENTMCNC: 34.5 G/DL (ref 31.4–37.4)
MCV RBC AUTO: 92 FL (ref 82–98)
MONOCYTES # BLD AUTO: 1.06 THOUSAND/ΜL (ref 0.17–1.22)
MONOCYTES NFR BLD AUTO: 7 % (ref 4–12)
NEUTROPHILS # BLD AUTO: 11.78 THOUSANDS/ΜL (ref 1.85–7.62)
NEUTS SEG NFR BLD AUTO: 81 % (ref 43–75)
NRBC BLD AUTO-RTO: 0 /100 WBCS
PLATELET # BLD AUTO: 299 THOUSANDS/UL (ref 149–390)
PMV BLD AUTO: 9.8 FL (ref 8.9–12.7)
POTASSIUM SERPL-SCNC: 3.9 MMOL/L (ref 3.5–5.3)
PROTHROMBIN TIME: 12.8 SECONDS (ref 11.8–14.2)
RBC # BLD AUTO: 4.88 MILLION/UL (ref 3.88–5.62)
SODIUM SERPL-SCNC: 140 MMOL/L (ref 136–145)
WBC # BLD AUTO: 14.7 THOUSAND/UL (ref 4.31–10.16)

## 2019-04-07 PROCEDURE — 20610 DRAIN/INJ JOINT/BURSA W/O US: CPT | Performed by: EMERGENCY MEDICINE

## 2019-04-07 PROCEDURE — 96365 THER/PROPH/DIAG IV INF INIT: CPT

## 2019-04-07 PROCEDURE — 96375 TX/PRO/DX INJ NEW DRUG ADDON: CPT

## 2019-04-07 PROCEDURE — 85610 PROTHROMBIN TIME: CPT | Performed by: EMERGENCY MEDICINE

## 2019-04-07 PROCEDURE — 99223 1ST HOSP IP/OBS HIGH 75: CPT | Performed by: INTERNAL MEDICINE

## 2019-04-07 PROCEDURE — 85652 RBC SED RATE AUTOMATED: CPT | Performed by: EMERGENCY MEDICINE

## 2019-04-07 PROCEDURE — 93971 EXTREMITY STUDY: CPT | Performed by: SURGERY

## 2019-04-07 PROCEDURE — 86140 C-REACTIVE PROTEIN: CPT | Performed by: EMERGENCY MEDICINE

## 2019-04-07 PROCEDURE — 0SJC3ZZ INSPECTION OF RIGHT KNEE JOINT, PERCUTANEOUS APPROACH: ICD-10-PCS | Performed by: EMERGENCY MEDICINE

## 2019-04-07 PROCEDURE — 85025 COMPLETE CBC W/AUTO DIFF WBC: CPT | Performed by: EMERGENCY MEDICINE

## 2019-04-07 PROCEDURE — 85730 THROMBOPLASTIN TIME PARTIAL: CPT | Performed by: EMERGENCY MEDICINE

## 2019-04-07 PROCEDURE — 36415 COLL VENOUS BLD VENIPUNCTURE: CPT | Performed by: EMERGENCY MEDICINE

## 2019-04-07 PROCEDURE — 99285 EMERGENCY DEPT VISIT HI MDM: CPT | Performed by: EMERGENCY MEDICINE

## 2019-04-07 PROCEDURE — 99242 OFF/OP CONSLTJ NEW/EST SF 20: CPT | Performed by: PHYSICIAN ASSISTANT

## 2019-04-07 PROCEDURE — 93971 EXTREMITY STUDY: CPT

## 2019-04-07 PROCEDURE — 99285 EMERGENCY DEPT VISIT HI MDM: CPT

## 2019-04-07 PROCEDURE — 80048 BASIC METABOLIC PNL TOTAL CA: CPT | Performed by: EMERGENCY MEDICINE

## 2019-04-07 PROCEDURE — 73560 X-RAY EXAM OF KNEE 1 OR 2: CPT

## 2019-04-07 PROCEDURE — G0382 LEV 3 HOSP TYPE B ED VISIT: HCPCS | Performed by: PHYSICIAN ASSISTANT

## 2019-04-07 PROCEDURE — 96376 TX/PRO/DX INJ SAME DRUG ADON: CPT

## 2019-04-07 RX ORDER — ALBUTEROL SULFATE 90 UG/1
2 AEROSOL, METERED RESPIRATORY (INHALATION) EVERY 4 HOURS PRN
Status: DISCONTINUED | OUTPATIENT
Start: 2019-04-07 | End: 2019-04-10 | Stop reason: HOSPADM

## 2019-04-07 RX ORDER — CEFAZOLIN SODIUM 1 G/50ML
1000 SOLUTION INTRAVENOUS EVERY 8 HOURS
Status: DISCONTINUED | OUTPATIENT
Start: 2019-04-07 | End: 2019-04-08

## 2019-04-07 RX ORDER — VENLAFAXINE HYDROCHLORIDE 150 MG/1
300 CAPSULE, EXTENDED RELEASE ORAL DAILY
Status: DISCONTINUED | OUTPATIENT
Start: 2019-04-07 | End: 2019-04-10 | Stop reason: HOSPADM

## 2019-04-07 RX ORDER — KETOROLAC TROMETHAMINE 30 MG/ML
15 INJECTION, SOLUTION INTRAMUSCULAR; INTRAVENOUS ONCE
Status: COMPLETED | OUTPATIENT
Start: 2019-04-07 | End: 2019-04-07

## 2019-04-07 RX ORDER — HYDROCHLOROTHIAZIDE 12.5 MG/1
12.5 TABLET ORAL DAILY
Status: DISCONTINUED | OUTPATIENT
Start: 2019-04-07 | End: 2019-04-10 | Stop reason: HOSPADM

## 2019-04-07 RX ORDER — ONDANSETRON 2 MG/ML
4 INJECTION INTRAMUSCULAR; INTRAVENOUS EVERY 6 HOURS PRN
Status: DISCONTINUED | OUTPATIENT
Start: 2019-04-07 | End: 2019-04-10 | Stop reason: HOSPADM

## 2019-04-07 RX ORDER — KETOROLAC TROMETHAMINE 30 MG/ML
30 INJECTION, SOLUTION INTRAMUSCULAR; INTRAVENOUS EVERY 6 HOURS PRN
Status: DISCONTINUED | OUTPATIENT
Start: 2019-04-07 | End: 2019-04-10 | Stop reason: HOSPADM

## 2019-04-07 RX ORDER — LIDOCAINE HYDROCHLORIDE 10 MG/ML
1 INJECTION, SOLUTION EPIDURAL; INFILTRATION; INTRACAUDAL; PERINEURAL ONCE
Status: COMPLETED | OUTPATIENT
Start: 2019-04-07 | End: 2019-04-07

## 2019-04-07 RX ORDER — SUMATRIPTAN 25 MG/1
25 TABLET, FILM COATED ORAL ONCE AS NEEDED
Status: COMPLETED | OUTPATIENT
Start: 2019-04-07 | End: 2019-04-08

## 2019-04-07 RX ORDER — CEFAZOLIN SODIUM 2 G/50ML
2000 SOLUTION INTRAVENOUS ONCE
Status: COMPLETED | OUTPATIENT
Start: 2019-04-07 | End: 2019-04-07

## 2019-04-07 RX ORDER — NICOTINE 21 MG/24HR
1 PATCH, TRANSDERMAL 24 HOURS TRANSDERMAL DAILY
Status: DISCONTINUED | OUTPATIENT
Start: 2019-04-07 | End: 2019-04-10 | Stop reason: HOSPADM

## 2019-04-07 RX ORDER — ONDANSETRON 2 MG/ML
4 INJECTION INTRAMUSCULAR; INTRAVENOUS ONCE
Status: COMPLETED | OUTPATIENT
Start: 2019-04-07 | End: 2019-04-07

## 2019-04-07 RX ORDER — LIDOCAINE HYDROCHLORIDE AND EPINEPHRINE 10; 10 MG/ML; UG/ML
10 INJECTION, SOLUTION INFILTRATION; PERINEURAL ONCE
Status: DISCONTINUED | OUTPATIENT
Start: 2019-04-07 | End: 2019-04-07

## 2019-04-07 RX ORDER — ZOLPIDEM TARTRATE 5 MG/1
10 TABLET ORAL
Status: DISCONTINUED | OUTPATIENT
Start: 2019-04-07 | End: 2019-04-10 | Stop reason: HOSPADM

## 2019-04-07 RX ORDER — LIDOCAINE HYDROCHLORIDE 10 MG/ML
10 INJECTION, SOLUTION EPIDURAL; INFILTRATION; INTRACAUDAL; PERINEURAL ONCE
Status: DISCONTINUED | OUTPATIENT
Start: 2019-04-07 | End: 2019-04-07

## 2019-04-07 RX ORDER — MORPHINE SULFATE 4 MG/ML
4 INJECTION, SOLUTION INTRAMUSCULAR; INTRAVENOUS ONCE
Status: COMPLETED | OUTPATIENT
Start: 2019-04-07 | End: 2019-04-07

## 2019-04-07 RX ORDER — OXYCODONE HYDROCHLORIDE 5 MG/1
5 TABLET ORAL EVERY 6 HOURS PRN
Status: DISCONTINUED | OUTPATIENT
Start: 2019-04-07 | End: 2019-04-10 | Stop reason: HOSPADM

## 2019-04-07 RX ORDER — ALPRAZOLAM 0.5 MG/1
0.5 TABLET ORAL DAILY PRN
Status: DISCONTINUED | OUTPATIENT
Start: 2019-04-07 | End: 2019-04-10 | Stop reason: HOSPADM

## 2019-04-07 RX ORDER — CEFAZOLIN SODIUM 2 G/50ML
2000 SOLUTION INTRAVENOUS EVERY 8 HOURS
Status: DISCONTINUED | OUTPATIENT
Start: 2019-04-07 | End: 2019-04-07

## 2019-04-07 RX ORDER — HYDROMORPHONE HCL/PF 1 MG/ML
1 SYRINGE (ML) INJECTION ONCE
Status: COMPLETED | OUTPATIENT
Start: 2019-04-07 | End: 2019-04-07

## 2019-04-07 RX ORDER — PANTOPRAZOLE SODIUM 40 MG/1
40 TABLET, DELAYED RELEASE ORAL DAILY
Status: DISCONTINUED | OUTPATIENT
Start: 2019-04-07 | End: 2019-04-10 | Stop reason: HOSPADM

## 2019-04-07 RX ADMIN — HYDROMORPHONE HYDROCHLORIDE 1 MG: 1 INJECTION, SOLUTION INTRAMUSCULAR; INTRAVENOUS; SUBCUTANEOUS at 11:13

## 2019-04-07 RX ADMIN — OXYCODONE HYDROCHLORIDE 5 MG: 5 TABLET ORAL at 23:26

## 2019-04-07 RX ADMIN — LIDOCAINE HYDROCHLORIDE 8 ML: 10 INJECTION, SOLUTION EPIDURAL; INFILTRATION; INTRACAUDAL; PERINEURAL at 11:14

## 2019-04-07 RX ADMIN — LIDOCAINE HYDROCHLORIDE 10 ML: 10 INJECTION, SOLUTION EPIDURAL; INFILTRATION; INTRACAUDAL; PERINEURAL at 10:18

## 2019-04-07 RX ADMIN — HYDROMORPHONE HYDROCHLORIDE 1 MG: 1 INJECTION, SOLUTION INTRAMUSCULAR; INTRAVENOUS; SUBCUTANEOUS at 12:58

## 2019-04-07 RX ADMIN — ONDANSETRON 4 MG: 2 INJECTION INTRAMUSCULAR; INTRAVENOUS at 18:39

## 2019-04-07 RX ADMIN — KETOROLAC TROMETHAMINE 30 MG: 30 INJECTION, SOLUTION INTRAMUSCULAR at 18:10

## 2019-04-07 RX ADMIN — NICOTINE 1 PATCH: 21 PATCH TRANSDERMAL at 14:31

## 2019-04-07 RX ADMIN — MORPHINE SULFATE 4 MG: 4 INJECTION INTRAVENOUS at 09:30

## 2019-04-07 RX ADMIN — ONDANSETRON 4 MG: 2 INJECTION INTRAMUSCULAR; INTRAVENOUS at 09:58

## 2019-04-07 RX ADMIN — KETOROLAC TROMETHAMINE 15 MG: 30 INJECTION, SOLUTION INTRAMUSCULAR at 09:21

## 2019-04-07 RX ADMIN — CEFAZOLIN SODIUM 2000 MG: 2 SOLUTION INTRAVENOUS at 11:21

## 2019-04-07 RX ADMIN — OXYCODONE HYDROCHLORIDE 5 MG: 5 TABLET ORAL at 14:32

## 2019-04-07 RX ADMIN — ENOXAPARIN SODIUM 40 MG: 40 INJECTION SUBCUTANEOUS at 14:31

## 2019-04-07 RX ADMIN — CEFAZOLIN SODIUM 1000 MG: 1 SOLUTION INTRAVENOUS at 18:12

## 2019-04-08 LAB
ANION GAP SERPL CALCULATED.3IONS-SCNC: 7 MMOL/L (ref 4–13)
BASOPHILS # BLD AUTO: 0.03 THOUSANDS/ΜL (ref 0–0.1)
BASOPHILS NFR BLD AUTO: 0 % (ref 0–1)
BUN SERPL-MCNC: 15 MG/DL (ref 5–25)
CALCIUM SERPL-MCNC: 9 MG/DL (ref 8.3–10.1)
CHLORIDE SERPL-SCNC: 100 MMOL/L (ref 100–108)
CO2 SERPL-SCNC: 31 MMOL/L (ref 21–32)
CREAT SERPL-MCNC: 1.14 MG/DL (ref 0.6–1.3)
EOSINOPHIL # BLD AUTO: 0.04 THOUSAND/ΜL (ref 0–0.61)
EOSINOPHIL NFR BLD AUTO: 0 % (ref 0–6)
ERYTHROCYTE [DISTWIDTH] IN BLOOD BY AUTOMATED COUNT: 12 % (ref 11.6–15.1)
GFR SERPL CREATININE-BSD FRML MDRD: 79 ML/MIN/1.73SQ M
GLUCOSE P FAST SERPL-MCNC: 119 MG/DL (ref 65–99)
GLUCOSE SERPL-MCNC: 119 MG/DL (ref 65–140)
HCT VFR BLD AUTO: 41.1 % (ref 36.5–49.3)
HGB BLD-MCNC: 14.3 G/DL (ref 12–17)
IMM GRANULOCYTES # BLD AUTO: 0.03 THOUSAND/UL (ref 0–0.2)
IMM GRANULOCYTES NFR BLD AUTO: 0 % (ref 0–2)
LYMPHOCYTES # BLD AUTO: 2 THOUSANDS/ΜL (ref 0.6–4.47)
LYMPHOCYTES NFR BLD AUTO: 18 % (ref 14–44)
MCH RBC QN AUTO: 32.6 PG (ref 26.8–34.3)
MCHC RBC AUTO-ENTMCNC: 34.8 G/DL (ref 31.4–37.4)
MCV RBC AUTO: 94 FL (ref 82–98)
MONOCYTES # BLD AUTO: 1.15 THOUSAND/ΜL (ref 0.17–1.22)
MONOCYTES NFR BLD AUTO: 11 % (ref 4–12)
NEUTROPHILS # BLD AUTO: 7.73 THOUSANDS/ΜL (ref 1.85–7.62)
NEUTS SEG NFR BLD AUTO: 71 % (ref 43–75)
NRBC BLD AUTO-RTO: 0 /100 WBCS
PLATELET # BLD AUTO: 240 THOUSANDS/UL (ref 149–390)
PMV BLD AUTO: 9.7 FL (ref 8.9–12.7)
POTASSIUM SERPL-SCNC: 3.3 MMOL/L (ref 3.5–5.3)
RBC # BLD AUTO: 4.38 MILLION/UL (ref 3.88–5.62)
SODIUM SERPL-SCNC: 138 MMOL/L (ref 136–145)
WBC # BLD AUTO: 10.98 THOUSAND/UL (ref 4.31–10.16)

## 2019-04-08 PROCEDURE — 99231 SBSQ HOSP IP/OBS SF/LOW 25: CPT | Performed by: PHYSICIAN ASSISTANT

## 2019-04-08 PROCEDURE — 85025 COMPLETE CBC W/AUTO DIFF WBC: CPT | Performed by: INTERNAL MEDICINE

## 2019-04-08 PROCEDURE — 80048 BASIC METABOLIC PNL TOTAL CA: CPT | Performed by: INTERNAL MEDICINE

## 2019-04-08 PROCEDURE — 99232 SBSQ HOSP IP/OBS MODERATE 35: CPT | Performed by: HOSPITALIST

## 2019-04-08 PROCEDURE — 99233 SBSQ HOSP IP/OBS HIGH 50: CPT | Performed by: INTERNAL MEDICINE

## 2019-04-08 RX ORDER — ACETAMINOPHEN 325 MG/1
650 TABLET ORAL EVERY 6 HOURS PRN
Status: DISCONTINUED | OUTPATIENT
Start: 2019-04-08 | End: 2019-04-10 | Stop reason: HOSPADM

## 2019-04-08 RX ORDER — POTASSIUM CHLORIDE 20 MEQ/1
40 TABLET, EXTENDED RELEASE ORAL ONCE
Status: COMPLETED | OUTPATIENT
Start: 2019-04-08 | End: 2019-04-08

## 2019-04-08 RX ADMIN — KETOROLAC TROMETHAMINE 30 MG: 30 INJECTION, SOLUTION INTRAMUSCULAR at 02:47

## 2019-04-08 RX ADMIN — ACETAMINOPHEN 650 MG: 325 TABLET ORAL at 15:46

## 2019-04-08 RX ADMIN — VANCOMYCIN HYDROCHLORIDE 1500 MG: 1 INJECTION, POWDER, LYOPHILIZED, FOR SOLUTION INTRAVENOUS at 23:04

## 2019-04-08 RX ADMIN — PANTOPRAZOLE SODIUM 40 MG: 40 TABLET, DELAYED RELEASE ORAL at 06:18

## 2019-04-08 RX ADMIN — SUMATRIPTAN SUCCINATE 25 MG: 25 TABLET ORAL at 08:20

## 2019-04-08 RX ADMIN — CEFAZOLIN SODIUM 1000 MG: 1 SOLUTION INTRAVENOUS at 02:20

## 2019-04-08 RX ADMIN — POTASSIUM CHLORIDE 40 MEQ: 1500 TABLET, EXTENDED RELEASE ORAL at 12:23

## 2019-04-08 RX ADMIN — OXYCODONE HYDROCHLORIDE 5 MG: 5 TABLET ORAL at 13:46

## 2019-04-08 RX ADMIN — ENOXAPARIN SODIUM 40 MG: 40 INJECTION SUBCUTANEOUS at 11:18

## 2019-04-08 RX ADMIN — VANCOMYCIN HYDROCHLORIDE 1500 MG: 1 INJECTION, POWDER, LYOPHILIZED, FOR SOLUTION INTRAVENOUS at 12:23

## 2019-04-08 RX ADMIN — CEFAZOLIN SODIUM 1000 MG: 1 SOLUTION INTRAVENOUS at 11:18

## 2019-04-08 RX ADMIN — VENLAFAXINE HYDROCHLORIDE 300 MG: 150 CAPSULE, EXTENDED RELEASE ORAL at 08:20

## 2019-04-08 RX ADMIN — KETOROLAC TROMETHAMINE 30 MG: 30 INJECTION, SOLUTION INTRAMUSCULAR at 21:45

## 2019-04-08 RX ADMIN — KETOROLAC TROMETHAMINE 30 MG: 30 INJECTION, SOLUTION INTRAMUSCULAR at 08:23

## 2019-04-08 RX ADMIN — NICOTINE 1 PATCH: 21 PATCH TRANSDERMAL at 08:20

## 2019-04-09 LAB
ANION GAP SERPL CALCULATED.3IONS-SCNC: 9 MMOL/L (ref 4–13)
BASOPHILS # BLD AUTO: 0.03 THOUSANDS/ΜL (ref 0–0.1)
BASOPHILS NFR BLD AUTO: 0 % (ref 0–1)
BUN SERPL-MCNC: 15 MG/DL (ref 5–25)
CALCIUM SERPL-MCNC: 9.2 MG/DL (ref 8.3–10.1)
CHLORIDE SERPL-SCNC: 103 MMOL/L (ref 100–108)
CO2 SERPL-SCNC: 29 MMOL/L (ref 21–32)
CREAT SERPL-MCNC: 0.95 MG/DL (ref 0.6–1.3)
EOSINOPHIL # BLD AUTO: 0.04 THOUSAND/ΜL (ref 0–0.61)
EOSINOPHIL NFR BLD AUTO: 0 % (ref 0–6)
ERYTHROCYTE [DISTWIDTH] IN BLOOD BY AUTOMATED COUNT: 11.9 % (ref 11.6–15.1)
GFR SERPL CREATININE-BSD FRML MDRD: 99 ML/MIN/1.73SQ M
GLUCOSE SERPL-MCNC: 117 MG/DL (ref 65–140)
HCT VFR BLD AUTO: 40.2 % (ref 36.5–49.3)
HGB BLD-MCNC: 13.6 G/DL (ref 12–17)
IMM GRANULOCYTES # BLD AUTO: 0.04 THOUSAND/UL (ref 0–0.2)
IMM GRANULOCYTES NFR BLD AUTO: 0 % (ref 0–2)
LYMPHOCYTES # BLD AUTO: 1.7 THOUSANDS/ΜL (ref 0.6–4.47)
LYMPHOCYTES NFR BLD AUTO: 16 % (ref 14–44)
MAGNESIUM SERPL-MCNC: 2.1 MG/DL (ref 1.6–2.6)
MCH RBC QN AUTO: 31.9 PG (ref 26.8–34.3)
MCHC RBC AUTO-ENTMCNC: 33.8 G/DL (ref 31.4–37.4)
MCV RBC AUTO: 94 FL (ref 82–98)
MONOCYTES # BLD AUTO: 1.02 THOUSAND/ΜL (ref 0.17–1.22)
MONOCYTES NFR BLD AUTO: 9 % (ref 4–12)
NEUTROPHILS # BLD AUTO: 7.97 THOUSANDS/ΜL (ref 1.85–7.62)
NEUTS SEG NFR BLD AUTO: 75 % (ref 43–75)
NRBC BLD AUTO-RTO: 0 /100 WBCS
PLATELET # BLD AUTO: 255 THOUSANDS/UL (ref 149–390)
PMV BLD AUTO: 10 FL (ref 8.9–12.7)
POTASSIUM SERPL-SCNC: 3.9 MMOL/L (ref 3.5–5.3)
RBC # BLD AUTO: 4.26 MILLION/UL (ref 3.88–5.62)
SODIUM SERPL-SCNC: 141 MMOL/L (ref 136–145)
WBC # BLD AUTO: 10.8 THOUSAND/UL (ref 4.31–10.16)

## 2019-04-09 PROCEDURE — 85025 COMPLETE CBC W/AUTO DIFF WBC: CPT | Performed by: HOSPITALIST

## 2019-04-09 PROCEDURE — 80048 BASIC METABOLIC PNL TOTAL CA: CPT | Performed by: HOSPITALIST

## 2019-04-09 PROCEDURE — 99232 SBSQ HOSP IP/OBS MODERATE 35: CPT | Performed by: INTERNAL MEDICINE

## 2019-04-09 PROCEDURE — 83735 ASSAY OF MAGNESIUM: CPT | Performed by: HOSPITALIST

## 2019-04-09 PROCEDURE — 99231 SBSQ HOSP IP/OBS SF/LOW 25: CPT | Performed by: ORTHOPAEDIC SURGERY

## 2019-04-09 RX ADMIN — NICOTINE 1 PATCH: 21 PATCH TRANSDERMAL at 08:49

## 2019-04-09 RX ADMIN — VANCOMYCIN HYDROCHLORIDE 1500 MG: 1 INJECTION, POWDER, LYOPHILIZED, FOR SOLUTION INTRAVENOUS at 23:34

## 2019-04-09 RX ADMIN — OXYCODONE HYDROCHLORIDE 5 MG: 5 TABLET ORAL at 08:49

## 2019-04-09 RX ADMIN — OXYCODONE HYDROCHLORIDE 5 MG: 5 TABLET ORAL at 17:15

## 2019-04-09 RX ADMIN — VENLAFAXINE HYDROCHLORIDE 300 MG: 150 CAPSULE, EXTENDED RELEASE ORAL at 08:49

## 2019-04-09 RX ADMIN — VANCOMYCIN HYDROCHLORIDE 1500 MG: 1 INJECTION, POWDER, LYOPHILIZED, FOR SOLUTION INTRAVENOUS at 11:43

## 2019-04-09 RX ADMIN — ENOXAPARIN SODIUM 40 MG: 40 INJECTION SUBCUTANEOUS at 08:50

## 2019-04-09 RX ADMIN — KETOROLAC TROMETHAMINE 30 MG: 30 INJECTION, SOLUTION INTRAMUSCULAR at 11:43

## 2019-04-09 RX ADMIN — PANTOPRAZOLE SODIUM 40 MG: 40 TABLET, DELAYED RELEASE ORAL at 05:40

## 2019-04-09 RX ADMIN — HYDROCHLOROTHIAZIDE 12.5 MG: 12.5 TABLET ORAL at 08:49

## 2019-04-10 VITALS
WEIGHT: 195 LBS | RESPIRATION RATE: 16 BRPM | OXYGEN SATURATION: 98 % | HEART RATE: 71 BPM | BODY MASS INDEX: 30.61 KG/M2 | DIASTOLIC BLOOD PRESSURE: 73 MMHG | HEIGHT: 67 IN | TEMPERATURE: 97.4 F | SYSTOLIC BLOOD PRESSURE: 116 MMHG

## 2019-04-10 PROCEDURE — 99239 HOSP IP/OBS DSCHRG MGMT >30: CPT | Performed by: HOSPITALIST

## 2019-04-10 PROCEDURE — 99232 SBSQ HOSP IP/OBS MODERATE 35: CPT | Performed by: INTERNAL MEDICINE

## 2019-04-10 RX ORDER — SULFAMETHOXAZOLE AND TRIMETHOPRIM 800; 160 MG/1; MG/1
1 TABLET ORAL EVERY 12 HOURS SCHEDULED
Qty: 20 TABLET | Refills: 0 | Status: SHIPPED | OUTPATIENT
Start: 2019-04-10 | End: 2019-04-20

## 2019-04-10 RX ADMIN — VENLAFAXINE HYDROCHLORIDE 300 MG: 150 CAPSULE, EXTENDED RELEASE ORAL at 08:10

## 2019-04-10 RX ADMIN — PANTOPRAZOLE SODIUM 40 MG: 40 TABLET, DELAYED RELEASE ORAL at 06:13

## 2019-04-10 RX ADMIN — ENOXAPARIN SODIUM 40 MG: 40 INJECTION SUBCUTANEOUS at 08:09

## 2019-04-10 RX ADMIN — HYDROCHLOROTHIAZIDE 12.5 MG: 12.5 TABLET ORAL at 08:10

## 2019-04-10 RX ADMIN — KETOROLAC TROMETHAMINE 30 MG: 30 INJECTION, SOLUTION INTRAMUSCULAR at 08:10

## 2019-04-10 RX ADMIN — NICOTINE 1 PATCH: 21 PATCH TRANSDERMAL at 08:10

## 2019-04-12 ENCOUNTER — TRANSITIONAL CARE MANAGEMENT (OUTPATIENT)
Dept: INTERNAL MEDICINE CLINIC | Facility: CLINIC | Age: 44
End: 2019-04-12

## 2019-04-15 ENCOUNTER — OFFICE VISIT (OUTPATIENT)
Dept: INTERNAL MEDICINE CLINIC | Age: 44
End: 2019-04-15
Payer: COMMERCIAL

## 2019-04-15 VITALS
DIASTOLIC BLOOD PRESSURE: 84 MMHG | BODY MASS INDEX: 31.92 KG/M2 | WEIGHT: 203.8 LBS | SYSTOLIC BLOOD PRESSURE: 130 MMHG | OXYGEN SATURATION: 99 % | TEMPERATURE: 98.4 F | HEART RATE: 86 BPM

## 2019-04-15 DIAGNOSIS — IMO0001 TRANSITION OF CARE PERFORMED WITH SHARING OF CLINICAL SUMMARY: ICD-10-CM

## 2019-04-15 DIAGNOSIS — N63.0 BREAST MASS IN MALE: Primary | ICD-10-CM

## 2019-04-15 DIAGNOSIS — M70.41 PREPATELLAR BURSITIS OF RIGHT KNEE: ICD-10-CM

## 2019-04-15 PROBLEM — T80.1XXA IV INFILTRATION: Status: ACTIVE | Noted: 2019-04-15

## 2019-04-15 PROBLEM — M70.40 PREPATELLAR BURSITIS: Status: ACTIVE | Noted: 2019-04-07

## 2019-04-15 PROCEDURE — 99495 TRANSJ CARE MGMT MOD F2F 14D: CPT | Performed by: INTERNAL MEDICINE

## 2019-05-13 ENCOUNTER — TRANSCRIBE ORDERS (OUTPATIENT)
Dept: ADMINISTRATIVE | Facility: HOSPITAL | Age: 44
End: 2019-05-13

## 2019-05-13 DIAGNOSIS — N63.0 BREAST MASS IN MALE: Primary | ICD-10-CM

## 2019-05-14 ENCOUNTER — HOSPITAL ENCOUNTER (OUTPATIENT)
Dept: MAMMOGRAPHY | Facility: CLINIC | Age: 44
Discharge: HOME | End: 2019-05-14
Payer: COMMERCIAL

## 2019-05-14 VITALS — WEIGHT: 203 LBS | HEIGHT: 68 IN | BODY MASS INDEX: 30.77 KG/M2

## 2019-05-14 DIAGNOSIS — N63.0 BREAST MASS IN MALE: ICD-10-CM

## 2019-05-14 PROCEDURE — 76642 ULTRASOUND BREAST LIMITED: CPT

## 2019-05-14 PROCEDURE — 77066 DX MAMMO INCL CAD BI: CPT

## 2019-06-27 ENCOUNTER — OFFICE VISIT (OUTPATIENT)
Dept: URGENT CARE | Age: 44
End: 2019-06-27
Payer: COMMERCIAL

## 2019-06-27 VITALS
WEIGHT: 206.8 LBS | OXYGEN SATURATION: 95 % | RESPIRATION RATE: 20 BRPM | SYSTOLIC BLOOD PRESSURE: 114 MMHG | HEART RATE: 87 BPM | DIASTOLIC BLOOD PRESSURE: 79 MMHG | TEMPERATURE: 98.2 F | HEIGHT: 68 IN | BODY MASS INDEX: 31.34 KG/M2

## 2019-06-27 DIAGNOSIS — B96.89 ACUTE BACTERIAL BRONCHITIS: Primary | ICD-10-CM

## 2019-06-27 DIAGNOSIS — J20.8 ACUTE BACTERIAL BRONCHITIS: Primary | ICD-10-CM

## 2019-06-27 PROCEDURE — 99213 OFFICE O/P EST LOW 20 MIN: CPT | Performed by: PHYSICIAN ASSISTANT

## 2019-06-27 RX ORDER — AZITHROMYCIN 250 MG/1
TABLET, FILM COATED ORAL
Qty: 6 TABLET | Refills: 0 | Status: SHIPPED | OUTPATIENT
Start: 2019-06-27 | End: 2019-07-02 | Stop reason: ALTCHOICE

## 2019-07-02 ENCOUNTER — OFFICE VISIT (OUTPATIENT)
Dept: INTERNAL MEDICINE CLINIC | Facility: CLINIC | Age: 44
End: 2019-07-02
Payer: COMMERCIAL

## 2019-07-02 VITALS
SYSTOLIC BLOOD PRESSURE: 122 MMHG | OXYGEN SATURATION: 98 % | BODY MASS INDEX: 31.13 KG/M2 | HEIGHT: 68 IN | DIASTOLIC BLOOD PRESSURE: 90 MMHG | HEART RATE: 82 BPM | WEIGHT: 205.4 LBS | TEMPERATURE: 98.3 F

## 2019-07-02 DIAGNOSIS — I10 ESSENTIAL HYPERTENSION: Primary | ICD-10-CM

## 2019-07-02 DIAGNOSIS — K21.9 GASTROESOPHAGEAL REFLUX DISEASE WITHOUT ESOPHAGITIS: ICD-10-CM

## 2019-07-02 DIAGNOSIS — F41.1 GENERALIZED ANXIETY DISORDER: ICD-10-CM

## 2019-07-02 DIAGNOSIS — R76.0 LUPUS ANTICOAGULANT POSITIVE: ICD-10-CM

## 2019-07-02 DIAGNOSIS — J45.20 MILD INTERMITTENT EXTRINSIC ASTHMA WITHOUT COMPLICATION: ICD-10-CM

## 2019-07-02 DIAGNOSIS — G47.33 OSA (OBSTRUCTIVE SLEEP APNEA): ICD-10-CM

## 2019-07-02 DIAGNOSIS — Z13.220 SCREENING CHOLESTEROL LEVEL: ICD-10-CM

## 2019-07-02 DIAGNOSIS — E55.9 VITAMIN D DEFICIENCY: ICD-10-CM

## 2019-07-02 DIAGNOSIS — F17.220 TOBACCO DEPENDENCE DUE TO CHEWING TOBACCO: ICD-10-CM

## 2019-07-02 DIAGNOSIS — Z63.0 MARITAL STRESS: ICD-10-CM

## 2019-07-02 PROBLEM — Z72.0 TOBACCO ABUSE: Status: RESOLVED | Noted: 2019-04-07 | Resolved: 2019-07-02

## 2019-07-02 PROBLEM — T80.1XXA IV INFILTRATION: Status: RESOLVED | Noted: 2019-04-15 | Resolved: 2019-07-02

## 2019-07-02 PROBLEM — F51.04 CHRONIC INSOMNIA: Status: RESOLVED | Noted: 2017-09-29 | Resolved: 2019-07-02

## 2019-07-02 PROBLEM — M62.838 MUSCLE SPASM: Status: RESOLVED | Noted: 2018-09-11 | Resolved: 2019-07-02

## 2019-07-02 PROBLEM — Z20.2 STD EXPOSURE: Status: RESOLVED | Noted: 2019-01-08 | Resolved: 2019-07-02

## 2019-07-02 PROCEDURE — 1036F TOBACCO NON-USER: CPT | Performed by: FAMILY MEDICINE

## 2019-07-02 PROCEDURE — 3008F BODY MASS INDEX DOCD: CPT | Performed by: FAMILY MEDICINE

## 2019-07-02 PROCEDURE — 99214 OFFICE O/P EST MOD 30 MIN: CPT | Performed by: FAMILY MEDICINE

## 2019-07-02 SDOH — SOCIAL STABILITY - SOCIAL INSECURITY: PROBLEMS IN RELATIONSHIP WITH SPOUSE OR PARTNER: Z63.0

## 2019-07-02 NOTE — PROGRESS NOTES
Assessment/Plan:    No problem-specific Assessment & Plan notes found for this encounter  Problem List Items Addressed This Visit        Digestive    Gastroesophageal reflux disease without esophagitis       Respiratory    Extrinsic asthma    BHARGAV (obstructive sleep apnea)       Cardiovascular and Mediastinum    Essential hypertension - Primary       Hematopoietic and Hemostatic    Lupus anticoagulant positive       Other    Generalized anxiety disorder    Relevant Orders    Comprehensive metabolic panel    CBC and differential    Vitamin D deficiency    Relevant Orders    Vitamin D 25 hydroxy    Tobacco dependence due to chewing tobacco    Marital stress      Other Visit Diagnoses     Screening cholesterol level        Relevant Orders    Lipid panel            Discussion, no changes to medications  No changes in Effexor since he feels that it is working well  Cont with  low-dose hydrochlorothiazide for elevated diastolic blood pressure  Check blood pressure at home  Replete vitamin-D with 34758 U weekly and recheck in 6 months  Patient understands and agrees  Patient completed  Controlled substance agreement in June 2018  Failed effexor 225 as well as lower doses  BMI Counseling: Body mass index is 31 23 kg/m²  Discussed the patient's BMI with him  The BMI is above average  BMI counseling and education was provided to the patient  Nutrition recommendations include decreasing overall calorie intake  Subjective:  F/up lipids     Patient ID: Moses Pugh is a 41 y o  male  HPI  Generalized Anxiety Disorder (Brief): The patient is being seen for a routine clinic follow-up of anxiety disorder   Symptoms:  no palpitations,-no chest discomfort,-no trouble breathing,-no trembling,-no paresthesias,-no lightheadedness,-no nausea,-no abdominal discomfort,-no excessive sweating,-no hot flashes,-no racing thoughts,-no excessive worrying,-no fear of dying,-no fear of losing control,-no flashbacks,-no repetitive behaviors-and-no sleep disturbance  The patient is currently asymptomatic  Associated symptoms:  no poor concentration,-no memory impairment,-no avoidance of physical exertion,-no irritability,-no agitation,-no hostility-and-no depression symptoms  Suicidal risk:  no suicidal thoughts  Homicidal risk:  no homicidal thoughts  Current treatment includes selective serotonin-norepinephrine reuptake inhibitors and benzodiazepines  There are no medication side effects  (taking xanax a few times per months, uses ambien at night 2-3 times per week  feels well  ) 9/11/18: update: feels that effexor is working well  Has not really needed his Xanax jan 2019:  Has been stressful in the marriage at home and due to the stress he decided to have an extramarital affair  He is now reconciling with his wife in things are better  He has had more controlled anxiety symptoms and has been out of his Xanax and Ambien but continues to take his Effexor as directed  Prior to his increase in anxiety he was using Xanax very infrequently  July 2019:Has been having some increased marital stress  and going to counseling  Usually his anxiety is very well controlled except when he his wife drinks too much  She is in denial and not willing to admit her alcohol intake which upsets the whole family life and harm in but he states children are safe and she has a good mom and they have great family and social support      Headache    This is a chronic problem  Episode onset: since saturday  The problem occurs intermittently  The problem has been waxing and waning  The pain is located in the frontal region  The pain does not radiate  The pain quality is similar to prior headaches  The quality of the pain is described as aching  Associated symptoms include blurred vision, dizziness and nausea   Pertinent negatives include no abdominal pain, back pain, coughing, ear pain, eye pain, fever, loss of balance, muscle aches, neck pain, numbness, rhinorrhea, sinus pressure, sore throat, visual change, vomiting or weakness  Nothing aggravates the symptoms  Treatments tried: tramadol, sumatriptan  The treatment provided mild relief  9/11/18: has had complete w/up by neuro  Better with improved hydration,  Has not missed effexor, HA is now 2-3- frontal locationa nd has not hcanged or radiated July 2019, doing better with headaches       Insomnia, uses Ambien occasionally throughout the week    Has been out for several weeks  Does not take it consistently July 2019, relatively well controlled  insomnia    The following portions of the patient's history were reviewed and updated as appropriate: allergies, current medications, past family history, past medical history, past social history, past surgical history and problem list     Review of Systems      Constitutional:  Denies fever or chills no significant weight changes  Eyes:  Denies change in visual acuity   HENT:  Denies nasal congestion or sore throat   Respiratory:  Denies cough or shortness of breath or wheezing  Cardiovascular:  Denies palpitations or chest pain  GI:  Denies abdominal pain, nausea, or vomiting no heartburn  Integument:  Denies rash no signs of infection  Neurologic:  Denies  focal weakness,  See HPI  :  No urine issues no discharges no nocturia      Objective:      /90 (BP Location: Left arm, Patient Position: Sitting, Cuff Size: Large)   Pulse 82   Temp 98 3 °F (36 8 °C) (Oral)   Ht 5' 8" (1 727 m)   Wt 93 2 kg (205 lb 6 4 oz)   SpO2 98%   BMI 31 23 kg/m²          Physical Exam      Constitutional:  Well developed, well nourished, no acute distress, non-toxic appearance   Eyes:  PERRL, conjunctiva normal , non icteric sclera  HENT:  Atraumatic, oropharynx moist  Neck-  supple   Respiratory:  CTA b/l, normal breath sounds, no rales, no wheezing   Cardiovascular:  RRR, no murmurs, no LE edema b/l  GI:  Soft, nondistended, normal bowel sounds x 4, nontender, no organomegaly, no mass, no rebound, no guarding   Neurologic:  no focal deficits noted   Psychiatric:  Speech and behavior appropriate , AAO x 3

## 2019-07-03 ENCOUNTER — TELEPHONE (OUTPATIENT)
Dept: INTERNAL MEDICINE CLINIC | Facility: CLINIC | Age: 44
End: 2019-07-03

## 2019-07-03 NOTE — TELEPHONE ENCOUNTER
Hello,     Patient's medication for Venlafaxine HCL is no longer covered by insurance prescription plan  Prior authorization was done and is being denied  I have, and have had Rick look in Epic and [employer], we can not find any other antidepressant medications that he has tried and failed  Patient currently has a 30 day supple he paid out of pocket for at his pharmacy    Please advise  Thank you     Sonali Ferreira

## 2019-07-08 DIAGNOSIS — F41.9 ANXIETY: ICD-10-CM

## 2019-07-08 RX ORDER — VENLAFAXINE HYDROCHLORIDE 150 MG/1
300 CAPSULE, EXTENDED RELEASE ORAL DAILY
Qty: 60 CAPSULE | Refills: 5 | Status: SHIPPED | OUTPATIENT
Start: 2019-07-08 | End: 2019-12-17 | Stop reason: SDUPTHER

## 2019-07-08 NOTE — TELEPHONE ENCOUNTER
Pt will continue to get at [facility]  Discussed with dr Groves Lanes  Per Dr Harlan Márquez ,this is fine  Script sent to Dr Harlan Márquez to fill  Pt notified

## 2019-07-08 NOTE — TELEPHONE ENCOUNTER
Discussed with patient at last appt  please check with him what he would like to do- change to another medication or change dose to 225 mg

## 2019-07-16 DIAGNOSIS — N62 GYNECOMASTIA: Primary | ICD-10-CM

## 2019-07-21 DIAGNOSIS — E55.9 VITAMIN D DEFICIENCY: ICD-10-CM

## 2019-07-22 RX ORDER — ERGOCALCIFEROL 1.25 MG/1
CAPSULE ORAL
Qty: 4 CAPSULE | Refills: 5 | OUTPATIENT
Start: 2019-07-22

## 2019-07-23 DIAGNOSIS — K21.9 GASTROESOPHAGEAL REFLUX DISEASE WITHOUT ESOPHAGITIS: ICD-10-CM

## 2019-07-23 RX ORDER — PANTOPRAZOLE SODIUM 40 MG/1
TABLET, DELAYED RELEASE ORAL
Qty: 30 TABLET | Refills: 5 | OUTPATIENT
Start: 2019-07-23

## 2019-07-31 ENCOUNTER — CONSULT (OUTPATIENT)
Dept: SURGERY | Facility: CLINIC | Age: 44
End: 2019-07-31
Payer: COMMERCIAL

## 2019-07-31 VITALS
BODY MASS INDEX: 31.4 KG/M2 | DIASTOLIC BLOOD PRESSURE: 78 MMHG | TEMPERATURE: 97.7 F | SYSTOLIC BLOOD PRESSURE: 128 MMHG | WEIGHT: 207.2 LBS | HEART RATE: 91 BPM | HEIGHT: 68 IN

## 2019-07-31 DIAGNOSIS — N63.0 BREAST MASS IN MALE: Primary | ICD-10-CM

## 2019-07-31 DIAGNOSIS — N62 GYNECOMASTIA: ICD-10-CM

## 2019-07-31 PROCEDURE — 99203 OFFICE O/P NEW LOW 30 MIN: CPT | Performed by: SURGERY

## 2019-07-31 NOTE — H&P (VIEW-ONLY)
Office Visit - [address] MRN: [id number]  Encounter: [id number]    Assessment and Plan    Problem List Items Addressed This Visit        Other    Breast mass in male - Primary     There is a firm area behind the nipple-areolar complex on the right side  I explained that what it would entail to remove this  I discussed the procedure in detail including risks, benefits, alternatives, and what to expect postoperatively  I told him there is always chance of seroma formation that may need to be drained  I told there is also sometimes problems with the nipple areolar complex and sometimes her left with a dip at that location  He understands and is agreeable to proceed  Plan:  Right breast biopsy                   Chief Complaint:  Jj Lam is a 41 y o  male who presents for Breast Problem (Consult right sided gynocomastia)    Subjective  21-year-old male referred for evaluation of his right breast   He has noted it is at least 1 lump in his breast   It hurts when somebody pushes on that area  He thinks it has gotten a little bit larger    He had mammogram and ultrasound performed    Past Medical History  Past Medical History:   Diagnosis Date    Accelerated essential hypertension     Colitis     DVT (deep venous thrombosis) (HCC)     Elevated ALT measurement     Headache     Holter monitor, abnormal     Left leg DVT ([address] ) 10/8/2016    PE (p[address] thromboembolism) [address] [address]     Syncope        Past Surgical History  Past Surgical History:   Procedure Laterality Date    HERNIA REPAIR Right     groin    KNEE ARTHROSCOPY Left     x2    KNEE SURGERY  10/2015    SHOULDER SURGERY Left     3x    VASECTOMY         Family History  Family History   Problem Relation Age of Onset   [facility] Migraines Mother     Diabetes Mother     Hypertension Father     Diabetes Father     Cancer Sister     Thyroid cancer Sister 28    Cancer Brother     Hypertension Brother     Liver disease Brother fatty liver    Cancer Maternal Grandfather     Other Maternal Grandfather         history of travel    Hypertension Paternal Grandmother     Diabetes Paternal Grandmother     Hypertension Paternal Grandfather     Cancer Other     Cancer Maternal Aunt        Social History  Social History     Socioeconomic History    Marital status: /Civil Union     Spouse name: None    Number of children: None    Years of education: None    Highest education level: None   Occupational History    Occupation:    Social Needs    Financial resource strain: None    Food insecurity:     Worry: None     Inability: None    Transportation needs:     Medical: None     Non-medical: None   Tobacco Use    Smoking status: Never Smoker    Smokeless tobacco: Current User     Types: Chew   Substance and Sexual Activity    Alcohol use: Yes     Comment: occassional    Drug use: No    Sexual activity: None   Lifestyle    Physical activity:     Days per week: None     Minutes per session: None    Stress: None   Relationships    Social connections:     Talks on phone: None     Gets together: None     Attends Gnosticist service: None     Active member of club or organization: None     Attends meetings of clubs or organizations: None     Relationship status: None    Intimate partner violence:     Fear of current or ex partner: None     Emotionally abused: None     Physically abused: None     Forced sexual activity: None   Other Topics Concern    None   Social History Narrative    Caffeine use - soda, 2 cans per day        Medications  Current Outpatient Medications on File Prior to Visit   Medication Sig Dispense Refill    ALPRAZolam (XANAX) 0 5 mg tablet Take 1 tablet (0 5 mg total) by mouth daily as needed for anxiety 30 tablet 3    hydrochlorothiazide (HYDRODIURIL) 12 5 mg tablet Take 1 tablet (12 5 mg total) by mouth daily 30 tablet 5    pantoprazole (PROTONIX) 40 mg tablet Take 1 tablet (40 mg total) by mouth daily 30 tablet 5    SUMAtriptan (IMITREX) 25 mg tablet Take 1 tablet by mouth as needed        traMADol (ULTRAM) 50 mg tablet Take 1 tablet (50 mg total) by mouth every 6 (six) hours as needed (as needed) 60 tablet 3    venlafaxine (EFFEXOR-XR) 150 mg 24 hr capsule Take 2 capsules (300 mg total) by mouth daily 60 capsule 5    zolpidem (AMBIEN) 10 mg tablet Take 1 tablet (10 mg total) by mouth daily at bedtime as needed for sleep 30 tablet 3     No current facility-administered medications on file prior to visit  Allergies  Allergies   Allergen Reactions    Lidocaine Anaphylaxis     Patch       Review of Systems   Constitutional: Negative for chills, fatigue and fever  HENT: Negative for congestion, ear pain, sneezing, trouble swallowing and voice change  Eyes: Negative for pain and discharge  Respiratory: Negative for cough, shortness of breath and wheezing  Cardiovascular: Negative for palpitations and leg swelling  Gastrointestinal: Negative for abdominal pain, constipation, diarrhea, nausea and vomiting  Endocrine: Negative for cold intolerance, heat intolerance and polyuria  Genitourinary: Negative for decreased urine volume, difficulty urinating and dysuria  Musculoskeletal: Negative for arthralgias and back pain  Skin: Negative for rash and wound  Allergic/Immunologic: Negative for environmental allergies and food allergies  Neurological: Positive for headaches  Negative for dizziness and weakness  Hematological: Negative for adenopathy  Does not bruise/bleed easily  Psychiatric/Behavioral: Negative for confusion and sleep disturbance  The patient is not nervous/anxious  All other systems reviewed and are negative  Objective  Vitals:    07/31/19 1553   BP: 128/78   Pulse: 91   Temp: 97 7 °F (36 5 °C)       Physical Exam   Constitutional: He is oriented to person, place, and time  He appears well-developed and well-nourished  No distress     HENT:   Head: Normocephalic and atraumatic  Right Ear: External ear normal    Left Ear: External ear normal    Eyes: Conjunctivae are normal  No scleral icterus  Neck: Normal range of motion  Neck supple  No tracheal deviation present  No thyromegaly present  Cardiovascular: Normal rate, regular rhythm and normal heart sounds  No murmur heard  Pulmonary/Chest: Effort normal and breath sounds normal  No respiratory distress  He has no wheezes  He has no rales  Breast exam  Right breast:  Somewhat larger than the left  There is a firm area about 4 cm across underneath the nipple-areolar complex  No other masses, no axillary adenopathy  Left breast:  Flat no discrete masses, no axillary adenopathy   Abdominal: Soft  He exhibits no distension and no mass  There is no tenderness  There is no rebound and no guarding  Musculoskeletal: Normal range of motion  He exhibits no edema  Lymphadenopathy:     He has no cervical adenopathy  Neurological: He is alert and oriented to person, place, and time  Skin: Skin is warm and dry  He is not diaphoretic  Psychiatric: He has a normal mood and affect  His behavior is normal  Judgment and thought content normal    Nursing note and vitals reviewed

## 2019-07-31 NOTE — PROGRESS NOTES
Office Visit - [address] MRN: [id number]  Encounter: [id number]    Assessment and Plan    Problem List Items Addressed This Visit        Other    Breast mass in male - Primary     There is a firm area behind the nipple-areolar complex on the right side  I explained that what it would entail to remove this  I discussed the procedure in detail including risks, benefits, alternatives, and what to expect postoperatively  I told him there is always chance of seroma formation that may need to be drained  I told there is also sometimes problems with the nipple areolar complex and sometimes her left with a dip at that location  He understands and is agreeable to proceed  Plan:  Right breast biopsy                   Chief Complaint:  Vita Stevenson is a 41 y o  male who presents for Breast Problem (Consult right sided gynocomastia)    Subjective  53-year-old male referred for evaluation of his right breast   He has noted it is at least 1 lump in his breast   It hurts when somebody pushes on that area  He thinks it has gotten a little bit larger    He had mammogram and ultrasound performed    Past Medical History  Past Medical History:   Diagnosis Date    Accelerated essential hypertension     Colitis     DVT (deep venous thrombosis) (HCC)     Elevated ALT measurement     Headache     Holter monitor, abnormal     Left leg DVT ([address] ) 10/8/2016    PE (p[facility] thromboembolism) ([facility] [address]     Syncope        Past Surgical History  Past Surgical History:   Procedure Laterality Date    HERNIA REPAIR Right     groin    KNEE ARTHROSCOPY Left     x2    KNEE SURGERY  10/2015    SHOULDER SURGERY Left     3x    VASECTOMY         Family History  Family History   Problem Relation Age of Onset   [facility] Migraines Mother     Diabetes Mother     Hypertension Father     Diabetes Father     Cancer Sister     Thyroid cancer Sister 28    Cancer Brother     Hypertension Brother     Liver disease Brother fatty liver    Cancer Maternal Grandfather     Other Maternal Grandfather         history of travel    Hypertension Paternal Grandmother     Diabetes Paternal Grandmother     Hypertension Paternal Grandfather     Cancer Other     Cancer Maternal Aunt        Social History  Social History     Socioeconomic History    Marital status: /Civil Union     Spouse name: None    Number of children: None    Years of education: None    Highest education level: None   Occupational History    Occupation:    Social Needs    Financial resource strain: None    Food insecurity:     Worry: None     Inability: None    Transportation needs:     Medical: None     Non-medical: None   Tobacco Use    Smoking status: Never Smoker    Smokeless tobacco: Current User     Types: Chew   Substance and Sexual Activity    Alcohol use: Yes     Comment: occassional    Drug use: No    Sexual activity: None   Lifestyle    Physical activity:     Days per week: None     Minutes per session: None    Stress: None   Relationships    Social connections:     Talks on phone: None     Gets together: None     Attends Oriental orthodox service: None     Active member of club or organization: None     Attends meetings of clubs or organizations: None     Relationship status: None    Intimate partner violence:     Fear of current or ex partner: None     Emotionally abused: None     Physically abused: None     Forced sexual activity: None   Other Topics Concern    None   Social History Narrative    Caffeine use - soda, 2 cans per day        Medications  Current Outpatient Medications on File Prior to Visit   Medication Sig Dispense Refill    ALPRAZolam (XANAX) 0 5 mg tablet Take 1 tablet (0 5 mg total) by mouth daily as needed for anxiety 30 tablet 3    hydrochlorothiazide (HYDRODIURIL) 12 5 mg tablet Take 1 tablet (12 5 mg total) by mouth daily 30 tablet 5    pantoprazole (PROTONIX) 40 mg tablet Take 1 tablet (40 mg total) by mouth daily 30 tablet 5    SUMAtriptan (IMITREX) 25 mg tablet Take 1 tablet by mouth as needed        traMADol (ULTRAM) 50 mg tablet Take 1 tablet (50 mg total) by mouth every 6 (six) hours as needed (as needed) 60 tablet 3    venlafaxine (EFFEXOR-XR) 150 mg 24 hr capsule Take 2 capsules (300 mg total) by mouth daily 60 capsule 5    zolpidem (AMBIEN) 10 mg tablet Take 1 tablet (10 mg total) by mouth daily at bedtime as needed for sleep 30 tablet 3     No current facility-administered medications on file prior to visit  Allergies  Allergies   Allergen Reactions    Lidocaine Anaphylaxis     Patch       Review of Systems   Constitutional: Negative for chills, fatigue and fever  HENT: Negative for congestion, ear pain, sneezing, trouble swallowing and voice change  Eyes: Negative for pain and discharge  Respiratory: Negative for cough, shortness of breath and wheezing  Cardiovascular: Negative for palpitations and leg swelling  Gastrointestinal: Negative for abdominal pain, constipation, diarrhea, nausea and vomiting  Endocrine: Negative for cold intolerance, heat intolerance and polyuria  Genitourinary: Negative for decreased urine volume, difficulty urinating and dysuria  Musculoskeletal: Negative for arthralgias and back pain  Skin: Negative for rash and wound  Allergic/Immunologic: Negative for environmental allergies and food allergies  Neurological: Positive for headaches  Negative for dizziness and weakness  Hematological: Negative for adenopathy  Does not bruise/bleed easily  Psychiatric/Behavioral: Negative for confusion and sleep disturbance  The patient is not nervous/anxious  All other systems reviewed and are negative  Objective  Vitals:    07/31/19 1553   BP: 128/78   Pulse: 91   Temp: 97 7 °F (36 5 °C)       Physical Exam   Constitutional: He is oriented to person, place, and time  He appears well-developed and well-nourished  No distress     HENT:   Head: Normocephalic and atraumatic  Right Ear: External ear normal    Left Ear: External ear normal    Eyes: Conjunctivae are normal  No scleral icterus  Neck: Normal range of motion  Neck supple  No tracheal deviation present  No thyromegaly present  Cardiovascular: Normal rate, regular rhythm and normal heart sounds  No murmur heard  Pulmonary/Chest: Effort normal and breath sounds normal  No respiratory distress  He has no wheezes  He has no rales  Breast exam  Right breast:  Somewhat larger than the left  There is a firm area about 4 cm across underneath the nipple-areolar complex  No other masses, no axillary adenopathy  Left breast:  Flat no discrete masses, no axillary adenopathy   Abdominal: Soft  He exhibits no distension and no mass  There is no tenderness  There is no rebound and no guarding  Musculoskeletal: Normal range of motion  He exhibits no edema  Lymphadenopathy:     He has no cervical adenopathy  Neurological: He is alert and oriented to person, place, and time  Skin: Skin is warm and dry  He is not diaphoretic  Psychiatric: He has a normal mood and affect  His behavior is normal  Judgment and thought content normal    Nursing note and vitals reviewed

## 2019-07-31 NOTE — ASSESSMENT & PLAN NOTE
There is a firm area behind the nipple-areolar complex on the right side  I explained that what it would entail to remove this  I discussed the procedure in detail including risks, benefits, alternatives, and what to expect postoperatively  I told him there is always chance of seroma formation that may need to be drained  I told there is also sometimes problems with the nipple areolar complex and sometimes her left with a dip at that location  He understands and is agreeable to proceed      Plan:  Right breast biopsy

## 2019-08-01 ENCOUNTER — TELEPHONE (OUTPATIENT)
Dept: INTERNAL MEDICINE CLINIC | Facility: CLINIC | Age: 44
End: 2019-08-01

## 2019-08-01 PROBLEM — N63.10 BREAST MASS, RIGHT: Status: ACTIVE | Noted: 2019-08-01

## 2019-08-01 NOTE — TELEPHONE ENCOUNTER
Pharmacy called over to advise us of the efexxor requiring a prior auth due to the pt having got a new insurance

## 2019-08-01 NOTE — TELEPHONE ENCOUNTER
Called [facility] pharmacy to obtain patient's Rx insurance information      [facility]    Phone #: [phone]    ID# [id number]

## 2019-08-01 NOTE — TELEPHONE ENCOUNTER
[employer] appeal started for Venlafaxine 150 mg 24hr - Take 2 caps daily, [phone]  Appeal completed via CoverMyMeds and faxed, [phone]

## 2019-08-05 DIAGNOSIS — K21.9 GASTROESOPHAGEAL REFLUX DISEASE WITHOUT ESOPHAGITIS: ICD-10-CM

## 2019-08-05 RX ORDER — PANTOPRAZOLE SODIUM 40 MG/1
40 TABLET, DELAYED RELEASE ORAL DAILY
Qty: 30 TABLET | Refills: 5 | Status: SHIPPED | OUTPATIENT
Start: 2019-08-05 | End: 2019-12-17 | Stop reason: SDUPTHER

## 2019-08-16 NOTE — TELEPHONE ENCOUNTER
Called to check prior auth status with [employer] and they indicated that it's still currently pending  Standard appeals take 30 days  Status is due back, 8/31/19

## 2019-08-19 ENCOUNTER — ANESTHESIA EVENT (OUTPATIENT)
Dept: PERIOP | Facility: AMBULARY SURGERY CENTER | Age: 44
End: 2019-08-19
Payer: COMMERCIAL

## 2019-08-19 NOTE — PRE-PROCEDURE INSTRUCTIONS
Pre-Surgery Instructions:   Medication Instructions    ALPRAZolam (XANAX) 0 5 mg tablet Instructed patient per Anesthesia Guidelines   hydrochlorothiazide (HYDRODIURIL) 12 5 mg tablet Instructed patient per Anesthesia Guidelines   pantoprazole (PROTONIX) 40 mg tablet Instructed patient per Anesthesia Guidelines   SUMAtriptan (IMITREX) 25 mg tablet Instructed patient per Anesthesia Guidelines   traMADol (ULTRAM) 50 mg tablet Instructed patient per Anesthesia Guidelines   venlafaxine (EFFEXOR-XR) 150 mg 24 hr capsule Instructed patient per Anesthesia Guidelines   zolpidem (AMBIEN) 10 mg tablet Instructed patient per Anesthesia Guidelines  Pre op and bathing instructions reviewed   Pt has hibiclens

## 2019-08-22 ENCOUNTER — ANESTHESIA (OUTPATIENT)
Dept: PERIOP | Facility: AMBULARY SURGERY CENTER | Age: 44
End: 2019-08-22
Payer: COMMERCIAL

## 2019-08-22 ENCOUNTER — HOSPITAL ENCOUNTER (OUTPATIENT)
Facility: AMBULARY SURGERY CENTER | Age: 44
Setting detail: OUTPATIENT SURGERY
Discharge: HOME | End: 2019-08-22
Attending: SURGERY | Admitting: SURGERY
Payer: COMMERCIAL

## 2019-08-22 VITALS
RESPIRATION RATE: 18 BRPM | TEMPERATURE: 98.2 F | OXYGEN SATURATION: 98 % | DIASTOLIC BLOOD PRESSURE: 70 MMHG | HEIGHT: 68 IN | WEIGHT: 204 LBS | HEART RATE: 82 BPM | BODY MASS INDEX: 30.92 KG/M2 | SYSTOLIC BLOOD PRESSURE: 120 MMHG

## 2019-08-22 DIAGNOSIS — N63.10 BREAST MASS, RIGHT: ICD-10-CM

## 2019-08-22 PROCEDURE — 88341 IMHCHEM/IMCYTCHM EA ADD ANTB: CPT | Performed by: PATHOLOGY

## 2019-08-22 PROCEDURE — 88342 IMHCHEM/IMCYTCHM 1ST ANTB: CPT | Performed by: PATHOLOGY

## 2019-08-22 PROCEDURE — 19120 REMOVAL OF BREAST LESION: CPT | Performed by: SURGERY

## 2019-08-22 PROCEDURE — 88305 TISSUE EXAM BY PATHOLOGIST: CPT | Performed by: PATHOLOGY

## 2019-08-22 RX ORDER — ONDANSETRON 2 MG/ML
INJECTION INTRAMUSCULAR; INTRAVENOUS AS NEEDED
Status: DISCONTINUED | OUTPATIENT
Start: 2019-08-22 | End: 2019-08-22 | Stop reason: SURG

## 2019-08-22 RX ORDER — ONDANSETRON 2 MG/ML
4 INJECTION INTRAMUSCULAR; INTRAVENOUS ONCE
Status: DISCONTINUED | OUTPATIENT
Start: 2019-08-22 | End: 2019-08-22 | Stop reason: HOSPADM

## 2019-08-22 RX ORDER — OXYCODONE HYDROCHLORIDE 5 MG/1
5 TABLET ORAL EVERY 4 HOURS PRN
Qty: 10 TABLET | Refills: 0 | Status: SHIPPED | OUTPATIENT
Start: 2019-08-22 | End: 2019-09-01

## 2019-08-22 RX ORDER — ONDANSETRON 2 MG/ML
4 INJECTION INTRAMUSCULAR; INTRAVENOUS ONCE AS NEEDED
Status: DISCONTINUED | OUTPATIENT
Start: 2019-08-22 | End: 2019-08-22 | Stop reason: HOSPADM

## 2019-08-22 RX ORDER — MIDAZOLAM HYDROCHLORIDE 1 MG/ML
INJECTION INTRAMUSCULAR; INTRAVENOUS AS NEEDED
Status: DISCONTINUED | OUTPATIENT
Start: 2019-08-22 | End: 2019-08-22 | Stop reason: SURG

## 2019-08-22 RX ORDER — HYDROMORPHONE HCL/PF 1 MG/ML
0.5 SYRINGE (ML) INJECTION
Status: DISCONTINUED | OUTPATIENT
Start: 2019-08-22 | End: 2019-08-22 | Stop reason: HOSPADM

## 2019-08-22 RX ORDER — SODIUM CHLORIDE, SODIUM LACTATE, POTASSIUM CHLORIDE, CALCIUM CHLORIDE 600; 310; 30; 20 MG/100ML; MG/100ML; MG/100ML; MG/100ML
INJECTION, SOLUTION INTRAVENOUS CONTINUOUS PRN
Status: DISCONTINUED | OUTPATIENT
Start: 2019-08-22 | End: 2019-08-22 | Stop reason: SURG

## 2019-08-22 RX ORDER — HYDROCODONE BITARTRATE AND ACETAMINOPHEN 5; 325 MG/1; MG/1
1 TABLET ORAL EVERY 4 HOURS PRN
Status: DISCONTINUED | OUTPATIENT
Start: 2019-08-22 | End: 2019-08-22 | Stop reason: HOSPADM

## 2019-08-22 RX ORDER — PROPOFOL 10 MG/ML
INJECTION, EMULSION INTRAVENOUS AS NEEDED
Status: DISCONTINUED | OUTPATIENT
Start: 2019-08-22 | End: 2019-08-22 | Stop reason: SURG

## 2019-08-22 RX ORDER — KETOROLAC TROMETHAMINE 30 MG/ML
INJECTION, SOLUTION INTRAMUSCULAR; INTRAVENOUS AS NEEDED
Status: DISCONTINUED | OUTPATIENT
Start: 2019-08-22 | End: 2019-08-22 | Stop reason: SURG

## 2019-08-22 RX ORDER — DEXAMETHASONE SODIUM PHOSPHATE 4 MG/ML
INJECTION, SOLUTION INTRA-ARTICULAR; INTRALESIONAL; INTRAMUSCULAR; INTRAVENOUS; SOFT TISSUE AS NEEDED
Status: DISCONTINUED | OUTPATIENT
Start: 2019-08-22 | End: 2019-08-22 | Stop reason: SURG

## 2019-08-22 RX ORDER — SODIUM CHLORIDE, SODIUM LACTATE, POTASSIUM CHLORIDE, CALCIUM CHLORIDE 600; 310; 30; 20 MG/100ML; MG/100ML; MG/100ML; MG/100ML
100 INJECTION, SOLUTION INTRAVENOUS CONTINUOUS
Status: DISCONTINUED | OUTPATIENT
Start: 2019-08-22 | End: 2019-08-22 | Stop reason: HOSPADM

## 2019-08-22 RX ORDER — BUPIVACAINE HYDROCHLORIDE AND EPINEPHRINE 5; 5 MG/ML; UG/ML
INJECTION, SOLUTION PERINEURAL AS NEEDED
Status: DISCONTINUED | OUTPATIENT
Start: 2019-08-22 | End: 2019-08-22 | Stop reason: HOSPADM

## 2019-08-22 RX ORDER — FENTANYL CITRATE/PF 50 MCG/ML
25 SYRINGE (ML) INJECTION
Status: DISCONTINUED | OUTPATIENT
Start: 2019-08-22 | End: 2019-08-22 | Stop reason: HOSPADM

## 2019-08-22 RX ORDER — GLYCOPYRROLATE 0.2 MG/ML
INJECTION INTRAMUSCULAR; INTRAVENOUS AS NEEDED
Status: DISCONTINUED | OUTPATIENT
Start: 2019-08-22 | End: 2019-08-22 | Stop reason: SURG

## 2019-08-22 RX ORDER — MAGNESIUM HYDROXIDE 1200 MG/15ML
LIQUID ORAL AS NEEDED
Status: DISCONTINUED | OUTPATIENT
Start: 2019-08-22 | End: 2019-08-22 | Stop reason: HOSPADM

## 2019-08-22 RX ORDER — METOCLOPRAMIDE HYDROCHLORIDE 5 MG/ML
10 INJECTION INTRAMUSCULAR; INTRAVENOUS ONCE AS NEEDED
Status: DISCONTINUED | OUTPATIENT
Start: 2019-08-22 | End: 2019-08-22 | Stop reason: HOSPADM

## 2019-08-22 RX ORDER — FENTANYL CITRATE 50 UG/ML
INJECTION, SOLUTION INTRAMUSCULAR; INTRAVENOUS AS NEEDED
Status: DISCONTINUED | OUTPATIENT
Start: 2019-08-22 | End: 2019-08-22 | Stop reason: SURG

## 2019-08-22 RX ORDER — HYDROMORPHONE HCL/PF 1 MG/ML
1 SYRINGE (ML) INJECTION EVERY 4 HOURS PRN
Status: DISCONTINUED | OUTPATIENT
Start: 2019-08-22 | End: 2019-08-22 | Stop reason: HOSPADM

## 2019-08-22 RX ORDER — SODIUM CHLORIDE, SODIUM LACTATE, POTASSIUM CHLORIDE, CALCIUM CHLORIDE 600; 310; 30; 20 MG/100ML; MG/100ML; MG/100ML; MG/100ML
125 INJECTION, SOLUTION INTRAVENOUS CONTINUOUS
Status: DISCONTINUED | OUTPATIENT
Start: 2019-08-22 | End: 2019-08-22 | Stop reason: HOSPADM

## 2019-08-22 RX ADMIN — ONDANSETRON 4 MG: 2 INJECTION INTRAMUSCULAR; INTRAVENOUS at 08:30

## 2019-08-22 RX ADMIN — FENTANYL CITRATE 50 MCG: 50 INJECTION, SOLUTION INTRAMUSCULAR; INTRAVENOUS at 08:43

## 2019-08-22 RX ADMIN — GLYCOPYRROLATE 0.2 MG: 0.2 INJECTION, SOLUTION INTRAMUSCULAR; INTRAVENOUS at 08:57

## 2019-08-22 RX ADMIN — KETOROLAC TROMETHAMINE 30 MG: 30 INJECTION, SOLUTION INTRAMUSCULAR at 08:56

## 2019-08-22 RX ADMIN — MIDAZOLAM HYDROCHLORIDE 2 MG: 1 INJECTION, SOLUTION INTRAMUSCULAR; INTRAVENOUS at 08:30

## 2019-08-22 RX ADMIN — SODIUM CHLORIDE, SODIUM LACTATE, POTASSIUM CHLORIDE, AND CALCIUM CHLORIDE: .6; .31; .03; .02 INJECTION, SOLUTION INTRAVENOUS at 08:30

## 2019-08-22 RX ADMIN — DEXAMETHASONE SODIUM PHOSPHATE 4 MG: 4 INJECTION, SOLUTION INTRAMUSCULAR; INTRAVENOUS at 08:32

## 2019-08-22 RX ADMIN — PROPOFOL 200 MG: 10 INJECTION, EMULSION INTRAVENOUS at 08:32

## 2019-08-22 RX ADMIN — FENTANYL CITRATE 50 MCG: 50 INJECTION, SOLUTION INTRAMUSCULAR; INTRAVENOUS at 08:32

## 2019-08-22 NOTE — ANESTHESIA PREPROCEDURE EVALUATION
Review of Systems/Medical History  Patient summary reviewed    No history of anesthetic complications     Cardiovascular  EKG reviewed, Negative cardio ROS Exercise tolerance (METS): >4,  Hypertension controlled, No dysrhythmias , No angina ,    Pulmonary  Smoker chewing tobacco use  , Tobacco cessation counseling given , Asthma , well controlled/ stable Last rescue: > 1 year ago , Sleep apnea Sleep Study completed,        GI/Hepatic    GERD , Liver disease ,   Comment: Fatty liver          Endo/Other    Obesity    GYN       Hematology  Negative hematology ROS      Musculoskeletal    Arthritis     Neurology    Headaches,    Psychology   Anxiety,              Physical Exam    Airway    Mallampati score: I  TM Distance: >3 FB  Neck ROM: full     Dental       Cardiovascular  Comment: Negative ROS, Rhythm: regular, Rate: normal,     Pulmonary  Breath sounds clear to auscultation,     Other Findings        Anesthesia Plan  ASA Score- 2     Anesthesia Type- general with ASA Monitors  Additional Monitors:   Airway Plan: LMA  Plan Factors- Patient instructed to abstain from smoking on day of procedure  Patient did not smoke on day of surgery  Induction- intravenous  Postoperative Plan-   Planned trial extubation    Informed Consent- Anesthetic plan and risks discussed with patient  I personally reviewed this patient with the CRNA  Discussed and agreed on the Anesthesia Plan with the CRNA  Pam Gutierrez

## 2019-08-22 NOTE — OP NOTE
OPERATIVE REPORT  PATIENT NAME: Shannon Pitts    :  1975  MRN: [id number]  Pt Location: [facility]    SURGERY DATE: 2019    Surgeon(s) and Role:     * Quique Haider MD - Primary     * Shira Blevins MD - Assisting    Preop Diagnosis:  Breast mass, right [N63 10]    Post-Op Diagnosis Codes:     * Breast mass, right [N63 10]    Procedure(s) (LRB):  EXCISIONAL BIOPSY RIGHT BREAST MASS (Right)    Specimen(s):  ID Type Source Tests Collected by Time Destination   1 : right breast mass Tissue Breast, Right TISSUE EXAM Quique Haider MD 2019 [id number]        Estimated Blood Loss:   2 mL    Drains:  * No LDAs found *    Anesthesia Type:   General    Operative Indications:  Breast mass, right [N63 10]      Operative Findings:  Excessive right breast tissue    Complications:   None    Procedure and Technique:  The patient was identified and brought to the operating room  A time-out was performed  SCDs were placed  Preoperative antibiotics were not indicated  General anesthesia was induced and LMA placed  The patient was prepped and draped in the usual sterile manner with chlorhexidine  A 2nd time-out was performed  Local anesthetic was administered to the lower half of the perirectal area were complex of the right breast   An incision was made along the lower half of the very low complex in a semicircle along the areola using a 15 blade scalpel  Bovie electrocautery was then used to complete this incision and undermined the Catalino accomplished  The umbilical trocars and used to dissect circumferentially around the right breast to remove the excess breast tissue seen  After this had been performed the specimen had been completely removed  There is no evidence of remaining excessive breast tissue  Hemostasis achieved using electrocautery  The incisions and the anti closed with 2 0 Vicryl in interrupted fashion in the deep dermal layer    More local anesthetic had been applied for total 30 cc   The final stitch was then closed using 4 Monocryl and a skin glue dressing was applied  After this had dried a fluff dressing and tape identified to give pressure the patient was on pressors general anesthesia LMA removed and transferred back in stable condition and tolerated procedure well     I was present for the entire procedure       Patient Disposition:  extubated and stable    SIGNATURE: Keysha Mehta MD  DATE: August 22, 2019  TIME: 9:08 AM

## 2019-08-22 NOTE — INTERVAL H&P NOTE
H&P reviewed  After examining the patient I find no changes in the patients condition since the H&P had been written    Plan: right breast biopsy

## 2019-08-26 DIAGNOSIS — G43.109 MIGRAINE WITH AURA AND WITHOUT STATUS MIGRAINOSUS, NOT INTRACTABLE: ICD-10-CM

## 2019-08-26 DIAGNOSIS — F41.9 ANXIETY: ICD-10-CM

## 2019-08-26 RX ORDER — ALPRAZOLAM 0.5 MG/1
0.5 TABLET ORAL DAILY PRN
Qty: 30 TABLET | Refills: 0 | Status: SHIPPED | OUTPATIENT
Start: 2019-08-26 | End: 2019-10-25 | Stop reason: SDUPTHER

## 2019-08-26 RX ORDER — TRAMADOL HYDROCHLORIDE 50 MG/1
50 TABLET ORAL EVERY 6 HOURS PRN
Qty: 60 TABLET | Refills: 0 | Status: SHIPPED | OUTPATIENT
Start: 2019-08-26 | End: 2019-10-25 | Stop reason: SDUPTHER

## 2019-08-26 NOTE — TELEPHONE ENCOUNTER
MED: Tramadol 50mg   SUPPLY: 90Day  PHARMACY: [facility]  PATIENT PHONE #:[phone]  LAST OV: 7/2/2019  UPCOMING OV: 9/3/2019    MED: Xanax 0 5mg   SUPPLY: 90Day  PHARMACY: [facility]  PATIENT PHONE #:[phone]  LAST OV: 7/2/2019  UPCOMING OV: 9/3/2019

## 2019-08-26 NOTE — TELEPHONE ENCOUNTER
Filled the script for Tramadol- 6/28/19 for 60 tablets for 15 days  Alprazolam- 6/22/19 for 30 tablets for 30 days

## 2019-09-03 ENCOUNTER — OFFICE VISIT (OUTPATIENT)
Dept: INTERNAL MEDICINE CLINIC | Age: 44
End: 2019-09-03
Payer: COMMERCIAL

## 2019-09-03 VITALS
DIASTOLIC BLOOD PRESSURE: 82 MMHG | BODY MASS INDEX: 31.49 KG/M2 | HEART RATE: 90 BPM | HEIGHT: 69 IN | SYSTOLIC BLOOD PRESSURE: 130 MMHG | OXYGEN SATURATION: 99 % | TEMPERATURE: 96.2 F | WEIGHT: 212.6 LBS

## 2019-09-03 DIAGNOSIS — F51.01 PRIMARY INSOMNIA: ICD-10-CM

## 2019-09-03 DIAGNOSIS — N62 GYNECOMASTIA: Primary | ICD-10-CM

## 2019-09-03 PROBLEM — N63.10 BREAST MASS, RIGHT: Status: RESOLVED | Noted: 2019-08-01 | Resolved: 2019-09-03

## 2019-09-03 PROCEDURE — 99213 OFFICE O/P EST LOW 20 MIN: CPT | Performed by: FAMILY MEDICINE

## 2019-09-03 RX ORDER — ZOLPIDEM TARTRATE 10 MG/1
10 TABLET ORAL
Qty: 30 TABLET | Refills: 3 | Status: SHIPPED | OUTPATIENT
Start: 2019-09-03 | End: 2019-12-17 | Stop reason: SDUPTHER

## 2019-09-03 NOTE — PROGRESS NOTES
Assessment/Plan:    No problem-specific Assessment & Plan notes found for this encounter  Problem List Items Addressed This Visit        Other    Gynecomastia - Primary      Other Visit Diagnoses     Primary insomnia        Relevant Medications    zolpidem (AMBIEN) 10 mg tablet            Discussion,  continue with Effexor which is now improved  No change in dose  Return November with lab work  Check blood pressure at home and call if any issues      Continue with postsurgical care  No changes to present medications  Call if any issues  All questions and concerns were addressed today    BMI Counseling: Body mass index is 31 67 kg/m²  Discussed the patient's BMI with him  The BMI is above average  BMI counseling and education was provided to the patient  Nutrition recommendations include decreasing overall calorie intake  Subjective:  F/up      Patient ID: Deb Howard is a 45 y o  male  HPI  Generalized Anxiety Disorder (Brief): The patient is being seen for a routine clinic follow-up of anxiety disorder  Symptoms:  no palpitations,-no chest discomfort,-no trouble breathing,-no trembling,-no paresthesias,-no lightheadedness,-no nausea,-no abdominal discomfort,-no excessive sweating,-no hot flashes,-no racing thoughts,-no excessive worrying,-no fear of dying,-no fear of losing control,-no flashbacks,-no repetitive behaviors-and-no sleep disturbance  The patient is currently asymptomatic  Associated symptoms:  no poor concentration,-no memory impairment,-no avoidance of physical exertion,-no irritability,-no agitation,-no hostility-and-no depression symptoms  Suicidal risk:  no suicidal thoughts  Homicidal risk:  no homicidal thoughts  Current treatment includes selective serotonin-norepinephrine reuptake inhibitors and benzodiazepines  There are no medication side effects  (taking xanax a few times per months, uses ambien at night 2-3 times per week   feels well  ) 9/11/18: update: feels that effexor is working well  Has not really needed his Xanax jan 2019:  Has been stressful in the marriage at home and due to the stress he decided to have an extramarital affair  He is now reconciling with his wife in things are better  He has had more controlled anxiety symptoms and has been out of his Xanax and Ambien but continues to take his Effexor as directed  Prior to his increase in anxiety he was using Xanax very infrequently  July 2019:Has been having some increased marital stress  and going to counseling  Usually his anxiety is very well controlled except when he his wife drinks too much  She is in denial and not willing to admit her alcohol intake which upsets the whole family life and harm in but he states children are safe and she has a good mom and they have great family and social support  September 2019  Doing well and still going to counseling  Sleeping is not a problem  Effexor was approved and taking medications as described          Insomnia, uses Ambien occasionally throughout the week  Has been out for several weeks  Does not take it consistently July 2019, relatively well controlled  insomnia September 2019, doing well with no issues  September 2019, had appointment for breast tissue removal   Healing very well and has not been allowed to go back to work for 2 weeks  Will be seen tomorrow by surgeon and hopefully cleared      The following portions of the patient's history were reviewed and updated as appropriate: allergies, current medications, past family history, past medical history, past social history, past surgical history and problem list     Review of Systems      Constitutional:  Denies fever or chills weight gain since last visit  Eyes:  Denies change in visual acuity   HENT:  Denies nasal congestion or sore throat   Respiratory:  Denies cough or shortness of breath or wheezing  Cardiovascular:  Denies palpitations or chest pain  GI:  Denies abdominal pain, nausea, or vomiting no heartburn  Integument:  Denies rash no signs of infection  :  No urine issues no discharges no nocturia      Objective:      /82   Pulse 90   Temp (!) 96 2 °F (35 7 °C) (Tympanic)   Ht 5' 8 7" (1 745 m)   Wt 96 4 kg (212 lb 9 6 oz)   SpO2 99%   BMI 31 67 kg/m²          Physical Exam      Constitutional:  Well developed, well nourished, no acute distress, non-toxic appearance   Eyes:  PERRL, conjunctiva normal , non icteric sclera  HENT:  Atraumatic, oropharynx moist  Neck-  supple   Respiratory:  CTA b/l, normal breath sounds, no rales, no wheezing   Cardiovascular:  RRR, no murmurs, no LE edema b/l  GI:  Soft, nondistended, normal bowel sounds x 4, nontender, no organomegaly, no mass, no rebound, no guarding   Neurologic:  no focal deficits noted   Psychiatric:  Speech and behavior appropriate , AAO x 3  Skin, left at areolar with surgical scar healing well  No signs of infection

## 2019-09-03 NOTE — TELEPHONE ENCOUNTER
[employer] standard appeal approved, CaseID# [id number], Eff 6/30/2019 - 12/31/2099    Called and notified [facility] pharmacy of prior auth approval     Also called and notified patient of approval

## 2019-09-04 ENCOUNTER — OFFICE VISIT (OUTPATIENT)
Dept: SURGERY | Facility: CLINIC | Age: 44
End: 2019-09-04

## 2019-09-04 VITALS
HEIGHT: 68 IN | WEIGHT: 212.6 LBS | DIASTOLIC BLOOD PRESSURE: 78 MMHG | TEMPERATURE: 97.1 F | BODY MASS INDEX: 32.22 KG/M2 | SYSTOLIC BLOOD PRESSURE: 128 MMHG | HEART RATE: 75 BPM

## 2019-09-04 DIAGNOSIS — Z98.890 STATUS POST RIGHT BREAST BIOPSY: Primary | ICD-10-CM

## 2019-09-04 PROBLEM — N63.0 BREAST MASS IN MALE: Status: RESOLVED | Noted: 2019-04-15 | Resolved: 2019-09-04

## 2019-09-04 PROCEDURE — 99024 POSTOP FOLLOW-UP VISIT: CPT | Performed by: SURGERY

## 2019-09-04 NOTE — LETTER
September 4, 2019     Patient: Nickolas Marlow   YOB: 1975   Date of Visit: 9/4/2019       To Whom it May Concern:    Mateo Crawford is under my professional care  He was seen in my office on 9/4/2019  He may return to work on September 9th with no restrictions  If you have any questions or concerns, please don't hesitate to call  Sincerely,          Michelle Valentin MD        CC: Lisa Chang

## 2019-09-04 NOTE — PROGRESS NOTES
Office Visit - [address] MRN: [id number]  Encounter: [id number]    Assessment and Plan    Problem List Items Addressed This Visit        Other    Status post right breast biopsy - Primary     Doing fairly well  I told him the incisional pain should get better as time goes on  He may have a little fluid accumulation, but will observe it for now  Recheck in a month's time  Chief Complaint:  Patricia Puente is a 45 y o  male who presents for Post-op (biopsy gynecomastia)    Subjective  61-year-old male status post right breast biopsy    Has a little discomfort at the incision site otherwise no other complaints or problems    Past Medical History  Past Medical History:   Diagnosis Date    Accelerated essential hypertension     Anxiety     Colitis     [facility] 2015    Elevated ALT measurement     GERD (gastroesophageal reflux disease)     Headache     Holter monitor, abnormal     Left leg DVT ([facility] [address] ) 10/8/2016    Migraine     PE [facility]pulmonary throm[facility]embolism) ([facility] ) 2015    Sleep apnea     mild    Syncope        Past Surgical History  Past Surgical History:   Procedure Laterality Date    BREAST LUMPECTOMY Right     HERNIA REPAIR Right     groin    KNEE ARTHROSCOPY Left     x2    KNEE SURGERY  10/2015    MO BIOPSY OF BREAST, NEEDLE CORE Right 8/22/2019    Procedure: EXCISIONAL BIOPSY RIGHT BREAST MASS;  Surgeon: Honey Pena MD;  [facility];  Service: General    SHOULDER SURGERY Left     3x    VASECTOMY      WISDOM TOOTH EXTRACTION         Family History  Family History   Problem Relation Age of Onset   [employer] Migraines Mother     Diabetes Mother     Hypertension Father     Diabetes Father     Cancer Sister     Thyroid cancer Sister 28    Cancer Brother     Hypertension Brother     Liver disease Brother         fatty liver    Cancer Maternal Grandfather     Other Maternal Grandfather         history of travel   [employer] Hypertension Paternal Grandmother     Diabetes Paternal Grandmother     Hypertension Paternal Grandfather     Cancer Other     Cancer Maternal Aunt        Social History  Social History     Socioeconomic History    Marital status: /Civil Union     Spouse name: None    Number of children: None    Years of education: None    Highest education level: None   Occupational History    Occupation:    Social Needs    Financial resource strain: None    Food insecurity:     Worry: None     Inability: None    Transportation needs:     Medical: None     Non-medical: None   Tobacco Use    Smoking status: Never Smoker    Smokeless tobacco: Current User     Types: Chew    Tobacco comment: chews daily   Substance and Sexual Activity    Alcohol use: Yes     Frequency: Monthly or less     Drinks per session: 1 or 2     Comment: occassional    Drug use: No    Sexual activity: None   Lifestyle    Physical activity:     Days per week: None     Minutes per session: None    Stress: None   Relationships    Social connections:     Talks on phone: None     Gets together: None     Attends Congregational service: None     Active member of club or organization: None     Attends meetings of clubs or organizations: None     Relationship status: None    Intimate partner violence:     Fear of current or ex partner: None     Emotionally abused: None     Physically abused: None     Forced sexual activity: None   Other Topics Concern    None   Social History Narrative    Caffeine use - soda, 2 cans per day        Medications  Current Outpatient Medications on File Prior to Visit   Medication Sig Dispense Refill    ALPRAZolam (XANAX) 0 5 mg tablet Take 1 tablet (0 5 mg total) by mouth daily as needed for anxiety 30 tablet 0    hydrochlorothiazide (HYDRODIURIL) 12 5 mg tablet Take 1 tablet (12 5 mg total) by mouth daily (Patient taking differently: Take 12 5 mg by mouth daily after breakfast ) 30 tablet 5    pantoprazole (PROTONIX) 40 mg tablet Take 1 tablet (40 mg total) by mouth daily (Patient taking differently: Take 40 mg by mouth daily in the early morning ) 30 tablet 5    SUMAtriptan (IMITREX) 25 mg tablet Take 1 tablet by mouth as needed        traMADol (ULTRAM) 50 mg tablet Take 1 tablet (50 mg total) by mouth every 6 (six) hours as needed (as needed) 60 tablet 0    venlafaxine (EFFEXOR-XR) 150 mg 24 hr capsule Take 2 capsules (300 mg total) by mouth daily (Patient taking differently: Take 300 mg by mouth daily in the early morning ) 60 capsule 5    zolpidem (AMBIEN) 10 mg tablet Take 1 tablet (10 mg total) by mouth daily at bedtime as needed for sleep 30 tablet 3     No current facility-administered medications on file prior to visit  Allergies  Allergies   Allergen Reactions    Lidocaine Anaphylaxis     Patch       Review of Systems    Objective  Vitals:    09/04/19 0835   BP: 128/78   Pulse: 75   Temp: (!) 97 1 °F (36 2 °C)       Physical Exam   Right breast incision healing well  There may be some fluid accumulation underneath the skin  1

## 2019-09-04 NOTE — ASSESSMENT & PLAN NOTE
Doing fairly well  I told him the incisional pain should get better as time goes on  He may have a little fluid accumulation, but will observe it for now  Recheck in a month's time

## 2019-10-02 ENCOUNTER — OFFICE VISIT (OUTPATIENT)
Dept: SURGERY | Facility: CLINIC | Age: 44
End: 2019-10-02

## 2019-10-02 VITALS
BODY MASS INDEX: 32.23 KG/M2 | TEMPERATURE: 97.6 F | HEART RATE: 76 BPM | WEIGHT: 212 LBS | SYSTOLIC BLOOD PRESSURE: 118 MMHG | DIASTOLIC BLOOD PRESSURE: 84 MMHG

## 2019-10-02 DIAGNOSIS — Z98.890 STATUS POST RIGHT BREAST BIOPSY: Primary | ICD-10-CM

## 2019-10-02 DIAGNOSIS — F41.9 ANXIETY: ICD-10-CM

## 2019-10-02 PROCEDURE — 99024 POSTOP FOLLOW-UP VISIT: CPT | Performed by: SURGERY

## 2019-10-02 RX ORDER — VENLAFAXINE HYDROCHLORIDE 150 MG/1
300 CAPSULE, EXTENDED RELEASE ORAL DAILY
Qty: 60 CAPSULE | Refills: 5 | OUTPATIENT
Start: 2019-10-02

## 2019-10-02 NOTE — PROGRESS NOTES
Office Visit - [address] MRN: [id number]  Encounter: [id number]    Assessment and Plan    Problem List Items Addressed This Visit        Other    Status post right breast biopsy - Primary     Doing well  There is no fluid accumulation that I can appreciate  Activity as tolerated  See back if needed  Chief Complaint:  Bette Washington is a 45 y o  male who presents for Post-op (post right gynemastia)    Subjective  70-year-old male status post right breast excision  At no new complaints or problems      Past Medical History  Past Medical History:   Diagnosis Date    Accelerated essential hypertension     Anxiety     Colitis     [facility] thrombosis) ([facility] ) 2015    Elevated ALT measurement     GERD (gastroesophageal reflux disease)     Headache     Holter monitor, abnormal     [place] leg DVT ([place] [address] ) 10/8/2016    Migraine     PE (pulmonary thromboembolism) ([place] [address] ) 2015    Sleep apnea     mild    Syncope        Past Surgical History  Past Surgical History:   Procedure Laterality Date    BREAST LUMPECTOMY Right     HERNIA REPAIR Right     groin    KNEE ARTHROSCOPY Left     x2    KNEE SURGERY  10/2015    [place] BIOPSY OF BREAST, NEEDLE CORE Right 8/22/2019    Procedure: EXCISIONAL BIOPSY RIGHT BREAST MASS;  Surgeon: Arik Hernandez MD;  [facility];  Service: General    SHOULDER SURGERY Left     3x    VASECTOMY      WISDOM TOOTH EXTRACTION         Family History  Family History   Problem Relation Age of Onset   [employer] Migraines Mother     Diabetes Mother     Hypertension Father     Diabetes Father     Cancer Sister     Thyroid cancer Sister 28    Cancer Brother     Hypertension Brother     Liver disease Brother         fatty liver    Cancer Maternal Grandfather     Other Maternal Grandfather         history of travel    Hypertension Paternal Grandmother     Diabetes Paternal Grandmother     Hypertension Paternal Grandfather     Cancer Other     Cancer Maternal Aunt        Social History  Social History     Socioeconomic History    Marital status: /Civil Union     Spouse name: None    Number of children: None    Years of education: None    Highest education level: None   Occupational History    Occupation:    Social Needs    Financial resource strain: None    Food insecurity:     Worry: None     Inability: None    Transportation needs:     Medical: None     Non-medical: None   Tobacco Use    Smoking status: Never Smoker    Smokeless tobacco: Current User     Types: Chew    Tobacco comment: chews daily   Substance and Sexual Activity    Alcohol use: Yes     Frequency: Monthly or less     Drinks per session: 1 or 2     Comment: occassional    Drug use: No    Sexual activity: None   Lifestyle    Physical activity:     Days per week: None     Minutes per session: None    Stress: None   Relationships    Social connections:     Talks on phone: None     Gets together: None     Attends Ep[employer] service: None     Active member of club or organization: None     Attends meetings of clubs or organizations: None     Relationship status: None    Intimate partner violence:     Fear of current or ex partner: None     Emotionally abused: None     Physically abused: None     Forced sexual activity: None   Other Topics Concern    None   Social History Narrative    Caffeine use - soda, 2 cans per day        Medications  Current Outpatient Medications on File Prior to Visit   Medication Sig Dispense Refill    ALPRAZolam (XANAX) 0 5 mg tablet Take 1 tablet (0 5 mg total) by mouth daily as needed for anxiety 30 tablet 0    hydrochlorothiazide (HYDRODIURIL) 12 5 mg tablet Take 1 tablet (12 5 mg total) by mouth daily (Patient taking differently: Take 12 5 mg by mouth daily after breakfast ) 30 tablet 5    pantoprazole (PROTONIX) 40 mg tablet Take 1 tablet (40 mg total) by mouth daily (Patient taking differently: Take 40 mg by mouth daily in the early morning ) 30 tablet 5    SUMAtriptan (IMITREX) 25 mg tablet Take 1 tablet by mouth as needed        venlafaxine (EFFEXOR-XR) 150 mg 24 hr capsule Take 2 capsules (300 mg total) by mouth daily (Patient taking differently: Take 300 mg by mouth daily in the early morning ) 60 capsule 5    zolpidem (AMBIEN) 10 mg tablet Take 1 tablet (10 mg total) by mouth daily at bedtime as needed for sleep 30 tablet 3    traMADol (ULTRAM) 50 mg tablet Take 1 tablet (50 mg total) by mouth every 6 (six) hours as needed (as needed) 60 tablet 0     No current facility-administered medications on file prior to visit  Allergies  Allergies   Allergen Reactions    Lidocaine Anaphylaxis     Patch       Review of Systems    Objective  Vitals:    10/02/19 0815   BP: 118/84   Pulse: 76   Temp: 97 6 °F (36 4 °C)       Physical Exam   Right breast incision healing well    There does not appear to be any fluid accumulation, everything feels in order

## 2019-10-02 NOTE — ASSESSMENT & PLAN NOTE
Doing well  There is no fluid accumulation that I can appreciate  Activity as tolerated  See back if needed

## 2019-10-25 DIAGNOSIS — F41.9 ANXIETY: ICD-10-CM

## 2019-10-25 DIAGNOSIS — G43.109 MIGRAINE WITH AURA AND WITHOUT STATUS MIGRAINOSUS, NOT INTRACTABLE: ICD-10-CM

## 2019-10-26 RX ORDER — TRAMADOL HYDROCHLORIDE 50 MG/1
50 TABLET ORAL EVERY 6 HOURS PRN
Qty: 60 TABLET | Refills: 0 | Status: SHIPPED | OUTPATIENT
Start: 2019-10-26 | End: 2019-12-17 | Stop reason: SDUPTHER

## 2019-10-26 RX ORDER — ALPRAZOLAM 0.5 MG/1
0.5 TABLET ORAL DAILY PRN
Qty: 30 TABLET | Refills: 0 | Status: SHIPPED | OUTPATIENT
Start: 2019-10-26 | End: 2019-12-17 | Stop reason: SDUPTHER

## 2019-12-17 ENCOUNTER — OFFICE VISIT (OUTPATIENT)
Dept: INTERNAL MEDICINE CLINIC | Facility: CLINIC | Age: 44
End: 2019-12-17
Payer: COMMERCIAL

## 2019-12-17 VITALS
TEMPERATURE: 98.6 F | WEIGHT: 218 LBS | DIASTOLIC BLOOD PRESSURE: 88 MMHG | HEIGHT: 68 IN | HEART RATE: 92 BPM | OXYGEN SATURATION: 98 % | SYSTOLIC BLOOD PRESSURE: 128 MMHG | BODY MASS INDEX: 33.04 KG/M2

## 2019-12-17 DIAGNOSIS — G47.33 OSA (OBSTRUCTIVE SLEEP APNEA): ICD-10-CM

## 2019-12-17 DIAGNOSIS — F51.01 PRIMARY INSOMNIA: ICD-10-CM

## 2019-12-17 DIAGNOSIS — Z23 NEED FOR INFLUENZA VACCINATION: Primary | ICD-10-CM

## 2019-12-17 DIAGNOSIS — F17.220 TOBACCO DEPENDENCE DUE TO CHEWING TOBACCO: ICD-10-CM

## 2019-12-17 DIAGNOSIS — K76.0 HEPATIC STEATOSIS: ICD-10-CM

## 2019-12-17 DIAGNOSIS — G43.109 MIGRAINE WITH AURA AND WITHOUT STATUS MIGRAINOSUS, NOT INTRACTABLE: ICD-10-CM

## 2019-12-17 DIAGNOSIS — E55.9 VITAMIN D DEFICIENCY: ICD-10-CM

## 2019-12-17 DIAGNOSIS — F41.1 GENERALIZED ANXIETY DISORDER: ICD-10-CM

## 2019-12-17 DIAGNOSIS — K21.9 GASTROESOPHAGEAL REFLUX DISEASE WITHOUT ESOPHAGITIS: ICD-10-CM

## 2019-12-17 DIAGNOSIS — T78.40XD ALLERGIC STATE, SUBSEQUENT ENCOUNTER: ICD-10-CM

## 2019-12-17 DIAGNOSIS — R06.83 SNORING: ICD-10-CM

## 2019-12-17 DIAGNOSIS — I10 ESSENTIAL HYPERTENSION: ICD-10-CM

## 2019-12-17 DIAGNOSIS — F41.9 ANXIETY: ICD-10-CM

## 2019-12-17 DIAGNOSIS — J45.20 MILD INTERMITTENT EXTRINSIC ASTHMA WITHOUT COMPLICATION: ICD-10-CM

## 2019-12-17 PROBLEM — Z63.0 MARITAL STRESS: Status: RESOLVED | Noted: 2019-07-02 | Resolved: 2019-12-17

## 2019-12-17 PROBLEM — I26.99 PULMONARY EMBOLISM (HCC): Status: RESOLVED | Noted: 2017-02-23 | Resolved: 2019-12-17

## 2019-12-17 PROBLEM — S62.339A CLOSED BOXER'S FRACTURE: Status: RESOLVED | Noted: 2019-01-08 | Resolved: 2019-12-17

## 2019-12-17 PROBLEM — Z98.890 STATUS POST RIGHT BREAST BIOPSY: Status: RESOLVED | Noted: 2019-09-04 | Resolved: 2019-12-17

## 2019-12-17 PROBLEM — N62 GYNECOMASTIA: Status: RESOLVED | Noted: 2019-07-16 | Resolved: 2019-12-17

## 2019-12-17 PROCEDURE — 90682 RIV4 VACC RECOMBINANT DNA IM: CPT

## 2019-12-17 PROCEDURE — 99214 OFFICE O/P EST MOD 30 MIN: CPT | Performed by: FAMILY MEDICINE

## 2019-12-17 PROCEDURE — 90471 IMMUNIZATION ADMIN: CPT

## 2019-12-17 PROCEDURE — 3008F BODY MASS INDEX DOCD: CPT | Performed by: FAMILY MEDICINE

## 2019-12-17 PROCEDURE — 1036F TOBACCO NON-USER: CPT | Performed by: FAMILY MEDICINE

## 2019-12-17 PROCEDURE — 3074F SYST BP LT 130 MM HG: CPT | Performed by: FAMILY MEDICINE

## 2019-12-17 PROCEDURE — 3079F DIAST BP 80-89 MM HG: CPT | Performed by: FAMILY MEDICINE

## 2019-12-17 RX ORDER — ZOLPIDEM TARTRATE 10 MG/1
10 TABLET ORAL
Qty: 30 TABLET | Refills: 3 | Status: SHIPPED | OUTPATIENT
Start: 2019-12-17 | End: 2020-06-11 | Stop reason: SDUPTHER

## 2019-12-17 RX ORDER — SUMATRIPTAN 25 MG/1
25 TABLET, FILM COATED ORAL AS NEEDED
Qty: 30 TABLET | Refills: 1 | Status: SHIPPED | OUTPATIENT
Start: 2019-12-17

## 2019-12-17 RX ORDER — TRAMADOL HYDROCHLORIDE 50 MG/1
50 TABLET ORAL EVERY 6 HOURS PRN
Qty: 60 TABLET | Refills: 1 | Status: SHIPPED | OUTPATIENT
Start: 2019-12-17 | End: 2020-06-11 | Stop reason: SDUPTHER

## 2019-12-17 RX ORDER — PANTOPRAZOLE SODIUM 40 MG/1
40 TABLET, DELAYED RELEASE ORAL DAILY
Qty: 90 TABLET | Refills: 1 | Status: SHIPPED | OUTPATIENT
Start: 2019-12-17 | End: 2020-06-11 | Stop reason: SDUPTHER

## 2019-12-17 RX ORDER — ALPRAZOLAM 0.5 MG/1
0.5 TABLET ORAL DAILY PRN
Qty: 30 TABLET | Refills: 0 | Status: SHIPPED | OUTPATIENT
Start: 2019-12-17 | End: 2020-06-11 | Stop reason: SDUPTHER

## 2019-12-17 RX ORDER — HYDROCHLOROTHIAZIDE 12.5 MG/1
12.5 TABLET ORAL DAILY
Qty: 90 TABLET | Refills: 1 | Status: SHIPPED | OUTPATIENT
Start: 2019-12-17 | End: 2020-06-11 | Stop reason: SDUPTHER

## 2019-12-17 RX ORDER — VENLAFAXINE HYDROCHLORIDE 150 MG/1
300 CAPSULE, EXTENDED RELEASE ORAL DAILY
Qty: 180 CAPSULE | Refills: 1 | Status: SHIPPED | OUTPATIENT
Start: 2019-12-17 | End: 2020-06-11 | Stop reason: SDUPTHER

## 2019-12-17 NOTE — PROGRESS NOTES
Assessment/Plan:    1  Need for influenza vaccination  -     influenza vaccine, [id number], quadrivalent, recombinant, PF, 0 5 mL, for patients 18 yr+ (FLUBLOK)    2  Anxiety  -     ALPRAZolam (XANAX) 0 5 mg tablet; Take 1 tablet (0 5 mg total) by mouth daily as needed for anxiety  -     venlafaxine (EFFEXOR-XR) 150 mg 24 hr capsule; Take 2 capsules (300 mg total) by mouth daily    3  Essential hypertension  -     hydrochlorothiazide (HYDRODIURIL) 12 5 mg tablet; Take 1 tablet (12 5 mg total) by mouth daily    4  Gastroesophageal reflux disease without esophagitis  -     pantoprazole (PROTONIX) 40 mg tablet; Take 1 tablet (40 mg total) by mouth daily    5  Migraine with aura and without status migrainosus, not intractable  -     SUMAtriptan (IMITREX) 25 mg tablet; Take 1 tablet (25 mg total) by mouth as needed for migraine  -     traMADol (ULTRAM) 50 mg tablet; Take 1 tablet (50 mg total) by mouth every 6 (six) hours as needed (as needed)    6  Primary insomnia  -     zolpidem (AMBIEN) 10 mg tablet; Take 1 tablet (10 mg total) by mouth daily at bedtime as needed for sleep    7  Generalized anxiety disorder    8  Hepatic steatosis  -     Hepatic function panel; Future  -     Basic metabolic panel; Future  -     CBC and differential; Future  -     Lipid panel; Future    9  Vitamin D deficiency  -     Vitamin D 25 hydroxy; Future    10  Mild intermittent extrinsic asthma without complication    11  Tobacco dependence due to chewing tobacco    12  Snoring    13  BHARGAV (obstructive sleep apnea)    14  Allergic state, subsequent encounter  -     [facility], Adult; Future        Tobacco Cessation Counseling: Tobacco cessation counseling was provided  The patient is sincerely urged to quit consumption of tobacco  He is ready to quit tobacco  Medication options and side effects of medication discussed  Patient agreed to medication  recommend restarting sleep apnea machine and will find his previous results  Recommend monitoring weight and check lab work  Will check for allergies and start over-the-counter Zyrtec during change of seasons  He is agreeable  Return in 4-5 months with blood work  There are no Patient Instructions on file for this visit  No follow-ups on file  Subjective:      Patient ID: Lorri Hairston is a 45 y o  male  Chief Complaint   Patient presents with    Follow-up     3 month follow up     Sinus Problem     C/O congestion        HPI    Generalized Anxiety Disorder (Brief): The patient is being seen for a routine clinic follow-up of anxiety disorder  Symptoms:  no palpitations,-no chest discomfort,-no trouble breathing,-no trembling,-no paresthesias,-no lightheadedness,-no nausea,-no abdominal discomfort,-no excessive sweating,-no hot flashes,-no racing thoughts,-no excessive worrying,-no fear of dying,-no fear of losing control,-no flashbacks,-no repetitive behaviors-and-no sleep disturbance  The patient is currently asymptomatic  Associated symptoms:  no poor concentration,-no memory impairment,-no avoidance of physical exertion,-no irritability,-no agitation,-no hostility-and-no depression symptoms  Suicidal risk:  no suicidal thoughts  Homicidal risk:  no homicidal thoughts  Current treatment includes selective serotonin-norepinephrine reuptake inhibitors and benzodiazepines  There are no medication side effects  (taking xanax a few times per months, uses ambien at night 2-3 times per week  feels well  ) 9/11/18: update: feels that effexor is working well  Has not really needed his Xanax jan 2019:  Has been stressful in the marriage at home and due to the stress he decided to have an extramarital affair  He is now reconciling with his wife in things are better  He has had more controlled anxiety symptoms and has been out of his Xanax and Ambien but continues to take his Effexor as directed  Prior to his increase in anxiety he was using Xanax very infrequently    July 2019:Has been having some increased marital stress  and going to counseling  Usually his anxiety is very well controlled except when he his wife drinks too much  She is in denial and not willing to admit her alcohol intake which upsets the whole family life and harm in but he states children are safe and she has a good mom and they have great family and social support  September 2019  Doing well and still going to counseling  Sleeping is not a problem  Effexor was approved and taking medications as described    dec 2019: has been using xanax a little more often at night to help with sleep when he doesn't use the [facility]     Home stress levels are good and his marital life is back to normal         Insomnia, uses Ambien occasionally throughout the week  Has been out for several weeks  Does not take it consistently July 2019, relatively well controlled  insomnia September 2019, doing well with no issues  December 2019, has not been sleeping well recently in thinks it was due to the weather and his recent sore throat which required him to go to urgent care  Has been using ambien more frequently but normally does not use it every night     Headaches:  Relatively stable but has needed to use Imitrex with the weather changes and the brain  He is not sure if he has any allergies and has never been checked  Sleep apnea, was sent to 2016 but results are currently not available  Has a sleep apnea machine that walked [facility] but he does not use it  He states that when he woke in the middle of it and when it was forcing air and he had a panic attack  Since that time he is not using    He has seen Neurology for his headaches and it was felt that his insomnia and headaches may have been due to sleep apnea but when did not uses machine long enough to see any changes      The following portions of the patient's history were reviewed and updated as appropriate: allergies, current medications, past family history, past medical history, past social history, past surgical history and problem list     Review of Systems        Constitutional:  Denies fever or chills , 6 lb weight gain in 2 months  Eyes:  Denies double or blurry vision  HENT:  Denies nasal congestion or sore throat , + sinus congestion  Respiratory:  Denies cough or shortness of breath or wheezing  Cardiovascular:  Denies palpitations or chest pain  GI:  Denies abdominal pain, nausea, or vomiting  Integument:  Denies rash , no open areas  Neurologic:  Denies headache or focal weakness      Current Outpatient Medications   Medication Sig Dispense Refill    ALPRAZolam (XANAX) 0 5 mg tablet Take 1 tablet (0 5 mg total) by mouth daily as needed for anxiety 30 tablet 0    hydrochlorothiazide (HYDRODIURIL) 12 5 mg tablet Take 1 tablet (12 5 mg total) by mouth daily 90 tablet 1    pantoprazole (PROTONIX) 40 mg tablet Take 1 tablet (40 mg total) by mouth daily 90 tablet 1    SUMAtriptan (IMITREX) 25 mg tablet Take 1 tablet (25 mg total) by mouth as needed for migraine 30 tablet 1    traMADol (ULTRAM) 50 mg tablet Take 1 tablet (50 mg total) by mouth every 6 (six) hours as needed (as needed) 60 tablet 1    venlafaxine (EFFEXOR-XR) 150 mg 24 hr capsule Take 2 capsules (300 mg total) by mouth daily 180 capsule 1    zolpidem (AMBIEN) 10 mg tablet Take 1 tablet (10 mg total) by mouth daily at bedtime as needed for sleep 30 tablet 3     No current facility-administered medications for this visit          Objective:    /88 (BP Location: Left arm, Patient Position: Sitting, Cuff Size: Large)   Pulse 92   Temp 98 6 °F (37 °C) (Oral)   Ht 5' 8" (1 727 m)   Wt 98 9 kg (218 lb)   SpO2 98%   BMI 33 15 kg/m²        Physical Exam      Constitutional:  Well developed, well nourished, no acute distress, non-toxic appearance   Eyes:  PERRL, conjunctiva normal , non icteric sclera  HENT:  Atraumatic, oropharynx moist  Neck-  supple   Respiratory:  CTA b/l, normal breath sounds, no rales, no wheezing   Cardiovascular:  RRR, no murmurs, no LE edema b/l  GI:  Soft, nondistended, normal bowel sounds x 4, nontender, no organomegaly, no mass, no rebound, no guarding   Neurologic:  no focal deficits noted   Psychiatric:  Speech and behavior appropriate , AAO x 3           Octavio Reyes, DO

## 2020-04-04 ENCOUNTER — HOSPITAL ENCOUNTER (EMERGENCY)
Facility: HOSPITAL | Age: 45
Discharge: HOME | End: 2020-04-04
Attending: EMERGENCY MEDICINE | Admitting: EMERGENCY MEDICINE
Payer: COMMERCIAL

## 2020-04-04 VITALS
OXYGEN SATURATION: 98 % | BODY MASS INDEX: 33.32 KG/M2 | WEIGHT: 219.14 LBS | RESPIRATION RATE: 16 BRPM | HEART RATE: 104 BPM | SYSTOLIC BLOOD PRESSURE: 162 MMHG | DIASTOLIC BLOOD PRESSURE: 81 MMHG | TEMPERATURE: 97.9 F

## 2020-04-04 DIAGNOSIS — L03.115 CELLULITIS OF RIGHT LOWER LEG: ICD-10-CM

## 2020-04-04 DIAGNOSIS — M70.41 PREPATELLAR BURSITIS OF RIGHT KNEE: Primary | ICD-10-CM

## 2020-04-04 PROCEDURE — 99283 EMERGENCY DEPT VISIT LOW MDM: CPT

## 2020-04-04 PROCEDURE — 96372 THER/PROPH/DIAG INJ SC/IM: CPT

## 2020-04-04 PROCEDURE — 99284 EMERGENCY DEPT VISIT MOD MDM: CPT | Performed by: EMERGENCY MEDICINE

## 2020-04-04 RX ORDER — CEPHALEXIN 500 MG/1
500 CAPSULE ORAL EVERY 6 HOURS SCHEDULED
Qty: 28 CAPSULE | Refills: 0 | Status: SHIPPED | OUTPATIENT
Start: 2020-04-04 | End: 2020-04-11

## 2020-04-04 RX ORDER — CEPHALEXIN 250 MG/1
500 CAPSULE ORAL ONCE
Status: COMPLETED | OUTPATIENT
Start: 2020-04-04 | End: 2020-04-04

## 2020-04-04 RX ORDER — SULFAMETHOXAZOLE AND TRIMETHOPRIM 800; 160 MG/1; MG/1
1 TABLET ORAL 2 TIMES DAILY
Qty: 14 TABLET | Refills: 0 | Status: SHIPPED | OUTPATIENT
Start: 2020-04-04 | End: 2020-04-11

## 2020-04-04 RX ORDER — KETOROLAC TROMETHAMINE 30 MG/ML
60 INJECTION, SOLUTION INTRAMUSCULAR; INTRAVENOUS ONCE
Status: COMPLETED | OUTPATIENT
Start: 2020-04-04 | End: 2020-04-04

## 2020-04-04 RX ORDER — TRAMADOL HYDROCHLORIDE 50 MG/1
50 TABLET ORAL EVERY 6 HOURS PRN
Qty: 12 TABLET | Refills: 0 | Status: SHIPPED | OUTPATIENT
Start: 2020-04-04 | End: 2020-04-14

## 2020-04-04 RX ORDER — DOXYCYCLINE HYCLATE 100 MG/1
100 CAPSULE ORAL EVERY 12 HOURS SCHEDULED
COMMUNITY
End: 2020-06-11

## 2020-04-04 RX ORDER — SULFAMETHOXAZOLE AND TRIMETHOPRIM 800; 160 MG/1; MG/1
1 TABLET ORAL ONCE
Status: COMPLETED | OUTPATIENT
Start: 2020-04-04 | End: 2020-04-04

## 2020-04-04 RX ORDER — IBUPROFEN 600 MG/1
600 TABLET ORAL EVERY 6 HOURS SCHEDULED
Qty: 28 TABLET | Refills: 0 | Status: SHIPPED | OUTPATIENT
Start: 2020-04-04 | End: 2020-07-31 | Stop reason: ALTCHOICE

## 2020-04-04 RX ADMIN — SULFAMETHOXAZOLE AND TRIMETHOPRIM 1 TABLET: 800; 160 TABLET ORAL at 21:02

## 2020-04-04 RX ADMIN — CEPHALEXIN 500 MG: 250 CAPSULE ORAL at 21:01

## 2020-04-04 RX ADMIN — KETOROLAC TROMETHAMINE 60 MG: 30 INJECTION, SOLUTION INTRAMUSCULAR at 21:01

## 2020-04-06 ENCOUNTER — VBI (OUTPATIENT)
Dept: ADMINISTRATIVE | Facility: OTHER | Age: 45
End: 2020-04-06

## 2020-06-11 ENCOUNTER — OFFICE VISIT (OUTPATIENT)
Dept: INTERNAL MEDICINE CLINIC | Facility: CLINIC | Age: 45
End: 2020-06-11
Payer: COMMERCIAL

## 2020-06-11 VITALS
WEIGHT: 221.2 LBS | BODY MASS INDEX: 33.52 KG/M2 | OXYGEN SATURATION: 96 % | HEIGHT: 68 IN | TEMPERATURE: 99.4 F | HEART RATE: 86 BPM | SYSTOLIC BLOOD PRESSURE: 130 MMHG | DIASTOLIC BLOOD PRESSURE: 90 MMHG

## 2020-06-11 DIAGNOSIS — G47.33 OSA (OBSTRUCTIVE SLEEP APNEA): ICD-10-CM

## 2020-06-11 DIAGNOSIS — J45.20 MILD INTERMITTENT EXTRINSIC ASTHMA WITHOUT COMPLICATION: ICD-10-CM

## 2020-06-11 DIAGNOSIS — F51.01 PRIMARY INSOMNIA: ICD-10-CM

## 2020-06-11 DIAGNOSIS — I10 ESSENTIAL HYPERTENSION: ICD-10-CM

## 2020-06-11 DIAGNOSIS — E55.9 VITAMIN D DEFICIENCY: ICD-10-CM

## 2020-06-11 DIAGNOSIS — F41.1 GENERALIZED ANXIETY DISORDER: Primary | ICD-10-CM

## 2020-06-11 DIAGNOSIS — Z13.220 SCREENING CHOLESTEROL LEVEL: ICD-10-CM

## 2020-06-11 DIAGNOSIS — F41.9 ANXIETY: ICD-10-CM

## 2020-06-11 DIAGNOSIS — J06.9 VIRAL UPPER RESPIRATORY INFECTION: ICD-10-CM

## 2020-06-11 DIAGNOSIS — K21.9 GASTROESOPHAGEAL REFLUX DISEASE WITHOUT ESOPHAGITIS: ICD-10-CM

## 2020-06-11 DIAGNOSIS — G43.109 MIGRAINE WITH AURA AND WITHOUT STATUS MIGRAINOSUS, NOT INTRACTABLE: ICD-10-CM

## 2020-06-11 PROCEDURE — 3008F BODY MASS INDEX DOCD: CPT | Performed by: FAMILY MEDICINE

## 2020-06-11 PROCEDURE — 4004F PT TOBACCO SCREEN RCVD TLK: CPT | Performed by: FAMILY MEDICINE

## 2020-06-11 PROCEDURE — 3075F SYST BP GE 130 - 139MM HG: CPT | Performed by: FAMILY MEDICINE

## 2020-06-11 PROCEDURE — 99214 OFFICE O/P EST MOD 30 MIN: CPT | Performed by: FAMILY MEDICINE

## 2020-06-11 PROCEDURE — 3080F DIAST BP >= 90 MM HG: CPT | Performed by: FAMILY MEDICINE

## 2020-06-11 RX ORDER — VENLAFAXINE HYDROCHLORIDE 150 MG/1
300 CAPSULE, EXTENDED RELEASE ORAL DAILY
Qty: 180 CAPSULE | Refills: 1 | Status: SHIPPED | OUTPATIENT
Start: 2020-06-11 | End: 2020-12-14 | Stop reason: SDUPTHER

## 2020-06-11 RX ORDER — PANTOPRAZOLE SODIUM 40 MG/1
40 TABLET, DELAYED RELEASE ORAL DAILY
Qty: 90 TABLET | Refills: 1 | Status: SHIPPED | OUTPATIENT
Start: 2020-06-11 | End: 2021-03-02 | Stop reason: SDUPTHER

## 2020-06-11 RX ORDER — ALBUTEROL SULFATE 90 UG/1
2 AEROSOL, METERED RESPIRATORY (INHALATION) EVERY 6 HOURS PRN
Qty: 1 INHALER | Refills: 0 | Status: SHIPPED | OUTPATIENT
Start: 2020-06-11

## 2020-06-11 RX ORDER — HYDROCHLOROTHIAZIDE 12.5 MG/1
12.5 TABLET ORAL DAILY
Qty: 90 TABLET | Refills: 1 | Status: SHIPPED | OUTPATIENT
Start: 2020-06-11 | End: 2021-03-02 | Stop reason: SDUPTHER

## 2020-06-11 RX ORDER — ZOLPIDEM TARTRATE 10 MG/1
10 TABLET ORAL
Qty: 30 TABLET | Refills: 3 | Status: SHIPPED | OUTPATIENT
Start: 2020-06-11 | End: 2020-10-15 | Stop reason: SDUPTHER

## 2020-06-11 RX ORDER — ALPRAZOLAM 0.5 MG/1
0.5 TABLET ORAL DAILY PRN
Qty: 30 TABLET | Refills: 1 | Status: SHIPPED | OUTPATIENT
Start: 2020-06-11 | End: 2020-10-15 | Stop reason: SDUPTHER

## 2020-06-11 RX ORDER — TRAMADOL HYDROCHLORIDE 50 MG/1
50 TABLET ORAL EVERY 6 HOURS PRN
Qty: 60 TABLET | Refills: 1 | Status: SHIPPED | OUTPATIENT
Start: 2020-06-11 | End: 2020-10-15 | Stop reason: SDUPTHER

## 2020-06-12 ENCOUNTER — CLINICAL SUPPORT (OUTPATIENT)
Dept: INTERNAL MEDICINE CLINIC | Facility: CLINIC | Age: 45
End: 2020-06-12
Payer: COMMERCIAL

## 2020-06-12 DIAGNOSIS — J06.9 VIRAL UPPER RESPIRATORY INFECTION: ICD-10-CM

## 2020-06-12 DIAGNOSIS — K76.0 HEPATIC STEATOSIS: ICD-10-CM

## 2020-06-12 DIAGNOSIS — I10 ESSENTIAL HYPERTENSION: ICD-10-CM

## 2020-06-12 DIAGNOSIS — E55.9 VITAMIN D DEFICIENCY: ICD-10-CM

## 2020-06-12 DIAGNOSIS — Z13.220 SCREENING FOR CHOLESTEROL LEVEL: Primary | ICD-10-CM

## 2020-06-12 DIAGNOSIS — T78.40XA ALLERGIC STATE, INITIAL ENCOUNTER: ICD-10-CM

## 2020-06-12 PROCEDURE — 36415 COLL VENOUS BLD VENIPUNCTURE: CPT

## 2020-06-13 LAB
25(OH)D3 SERPL-MCNC: 16 NG/ML (ref 30–100)
ALBUMIN SERPL-MCNC: 4.5 G/DL (ref 3.6–5.1)
ALBUMIN/GLOB SERPL: 2 (CALC) (ref 1–2.5)
ALP SERPL-CCNC: 70 U/L (ref 36–130)
ALT SERPL-CCNC: 60 U/L (ref 9–46)
AST SERPL-CCNC: 31 U/L (ref 10–40)
BASOPHILS # BLD AUTO: 53 CELLS/UL (ref 0–200)
BASOPHILS NFR BLD AUTO: 0.9 %
BILIRUB DIRECT SERPL-MCNC: 0.1 MG/DL
BILIRUB INDIRECT SERPL-MCNC: 0.2 MG/DL (CALC) (ref 0.2–1.2)
BILIRUB SERPL-MCNC: 0.3 MG/DL (ref 0.2–1.2)
BUN SERPL-MCNC: 19 MG/DL (ref 7–25)
BUN/CREAT SERPL: NORMAL (CALC) (ref 6–22)
CALCIUM SERPL-MCNC: 9.1 MG/DL (ref 8.6–10.3)
CHLORIDE SERPL-SCNC: 101 MMOL/L (ref 98–110)
CHOLEST SERPL-MCNC: 195 MG/DL
CHOLEST/HDLC SERPL: 3.4 (CALC)
CO2 SERPL-SCNC: 27 MMOL/L (ref 20–32)
CREAT SERPL-MCNC: 0.94 MG/DL (ref 0.6–1.35)
EOSINOPHIL # BLD AUTO: 83 CELLS/UL (ref 15–500)
EOSINOPHIL NFR BLD AUTO: 1.4 %
ERYTHROCYTE [DISTWIDTH] IN BLOOD BY AUTOMATED COUNT: 12.7 % (ref 11–15)
GLOBULIN SER CALC-MCNC: 2.2 G/DL (CALC) (ref 1.9–3.7)
GLUCOSE SERPL-MCNC: 92 MG/DL (ref 65–99)
HCT VFR BLD AUTO: 43.9 % (ref 38.5–50)
HDLC SERPL-MCNC: 58 MG/DL
HGB BLD-MCNC: 14.8 G/DL (ref 13.2–17.1)
LDLC SERPL CALC-MCNC: 113 MG/DL (CALC)
LYMPHOCYTES # BLD AUTO: 2572 CELLS/UL (ref 850–3900)
LYMPHOCYTES NFR BLD AUTO: 43.6 %
MCH RBC QN AUTO: 31.2 PG (ref 27–33)
MCHC RBC AUTO-ENTMCNC: 33.7 G/DL (ref 32–36)
MCV RBC AUTO: 92.6 FL (ref 80–100)
MONOCYTES # BLD AUTO: 454 CELLS/UL (ref 200–950)
MONOCYTES NFR BLD AUTO: 7.7 %
NEUTROPHILS # BLD AUTO: 2738 CELLS/UL (ref 1500–7800)
NEUTROPHILS NFR BLD AUTO: 46.4 %
NONHDLC SERPL-MCNC: 137 MG/DL (CALC)
PLATELET # BLD AUTO: 250 THOUSAND/UL (ref 140–400)
PMV BLD REES-ECKER: 10.4 FL (ref 7.5–12.5)
POTASSIUM SERPL-SCNC: 4 MMOL/L (ref 3.5–5.3)
PROT SERPL-MCNC: 6.7 G/DL (ref 6.1–8.1)
RBC # BLD AUTO: 4.74 MILLION/UL (ref 4.2–5.8)
SL AMB EGFR AFRICAN AMERICAN: 115 ML/MIN/1.73M2
SL AMB EGFR NON AFRICAN AMERICAN: 100 ML/MIN/1.73M2
SODIUM SERPL-SCNC: 140 MMOL/L (ref 135–146)
TRIGL SERPL-MCNC: 126 MG/DL
WBC # BLD AUTO: 5.9 THOUSAND/UL (ref 3.8–10.8)

## 2020-06-16 LAB
A ALTERNATA IGE QN: <0.1 KU/L
C HERBARUM IGE QN: <0.1 KU/L
CAT DANDER IGE QN: <0.1 KU/L
COMMON RAGWEED IGE QN: <0.1 KU/L
D FARINAE IGE QN: <0.1 KU/L
DEPRECATED A ALTERNATA IGE RAST QL: 0
DEPRECATED C HERBARUM IGE RAST QL: 0
DEPRECATED CAT DANDER IGE RAST QL: 0
DEPRECATED COMMON RAGWEED IGE RAST QL: 0
DEPRECATED D FARINAE IGE RAST QL: 0
DEPRECATED DOG DANDER IGE RAST QL: 0
DEPRECATED GOOSEFOOT IGE RAST QL: 0
DEPRECATED KENT BLUE GRASS IGE RAST QL: 0
DEPRECATED TIMOTHY IGE RAST QL: 0
DEPRECATED WHITE OAK IGE RAST QL: 0
DOG DANDER IGE QN: <0.1 KU/L
GOOSEFOOT IGE QN: <0.1 KU/L
KENT BLUE GRASS IGE QN: <0.1 KU/L
TIMOTHY IGE QN: <0.1 KU/L
WHITE OAK IGE QN: <0.1 KU/L

## 2020-06-17 LAB
SARS-COV-2 IGG SERPL QL IA: NEGATIVE
SARS-COV-2 IGG SERPL QL IA: NORMAL

## 2020-06-22 DIAGNOSIS — E55.9 VITAMIN D DEFICIENCY: Primary | ICD-10-CM

## 2020-06-22 RX ORDER — ERGOCALCIFEROL 1.25 MG/1
50000 CAPSULE ORAL WEEKLY
Qty: 12 CAPSULE | Refills: 0 | Status: SHIPPED | OUTPATIENT
Start: 2020-06-22 | End: 2021-04-08 | Stop reason: SDUPTHER

## 2020-07-02 DIAGNOSIS — J45.20 MILD INTERMITTENT EXTRINSIC ASTHMA WITHOUT COMPLICATION: ICD-10-CM

## 2020-07-02 RX ORDER — ALBUTEROL SULFATE 90 UG/1
AEROSOL, METERED RESPIRATORY (INHALATION)
Qty: 6.7 INHALER | Refills: 0 | OUTPATIENT
Start: 2020-07-02

## 2020-07-31 ENCOUNTER — OFFICE VISIT (OUTPATIENT)
Dept: URGENT CARE | Age: 45
End: 2020-07-31
Payer: COMMERCIAL

## 2020-07-31 ENCOUNTER — APPOINTMENT (EMERGENCY)
Dept: CT IMAGING | Facility: HOSPITAL | Age: 45
End: 2020-07-31
Payer: COMMERCIAL

## 2020-07-31 ENCOUNTER — HOSPITAL ENCOUNTER (EMERGENCY)
Facility: HOSPITAL | Age: 45
Discharge: HOME | End: 2020-07-31
Attending: EMERGENCY MEDICINE
Payer: COMMERCIAL

## 2020-07-31 VITALS
SYSTOLIC BLOOD PRESSURE: 118 MMHG | OXYGEN SATURATION: 97 % | HEART RATE: 73 BPM | DIASTOLIC BLOOD PRESSURE: 70 MMHG | TEMPERATURE: 97.7 F | BODY MASS INDEX: 33.45 KG/M2 | RESPIRATION RATE: 16 BRPM | WEIGHT: 220.02 LBS

## 2020-07-31 VITALS
HEART RATE: 87 BPM | TEMPERATURE: 97.7 F | DIASTOLIC BLOOD PRESSURE: 80 MMHG | WEIGHT: 220.8 LBS | BODY MASS INDEX: 33.46 KG/M2 | SYSTOLIC BLOOD PRESSURE: 127 MMHG | OXYGEN SATURATION: 97 % | HEIGHT: 68 IN | RESPIRATION RATE: 18 BRPM

## 2020-07-31 DIAGNOSIS — R10.31 RIGHT GROIN PAIN: ICD-10-CM

## 2020-07-31 DIAGNOSIS — R10.31 RIGHT LOWER QUADRANT ABDOMINAL PAIN: Primary | ICD-10-CM

## 2020-07-31 LAB
ALBUMIN SERPL BCP-MCNC: 4.3 G/DL (ref 3.5–5)
ALP SERPL-CCNC: 85 U/L (ref 46–116)
ALT SERPL W P-5'-P-CCNC: 135 U/L (ref 12–78)
ANION GAP SERPL CALCULATED.3IONS-SCNC: 12 MMOL/L (ref 4–13)
APTT PPP: 28 SECONDS (ref 23–37)
AST SERPL W P-5'-P-CCNC: 66 U/L (ref 5–45)
BASOPHILS # BLD AUTO: 0.05 THOUSANDS/ΜL (ref 0–0.1)
BASOPHILS NFR BLD AUTO: 1 % (ref 0–1)
BILIRUB SERPL-MCNC: 0.73 MG/DL (ref 0.2–1)
BILIRUB UR QL STRIP: NEGATIVE
BUN SERPL-MCNC: 15 MG/DL (ref 5–25)
CALCIUM SERPL-MCNC: 8.8 MG/DL (ref 8.3–10.1)
CHLORIDE SERPL-SCNC: 102 MMOL/L (ref 100–108)
CLARITY UR: CLEAR
CO2 SERPL-SCNC: 25 MMOL/L (ref 21–32)
COLOR UR: YELLOW
COLOR, POC: YELLOW
CREAT SERPL-MCNC: 1.04 MG/DL (ref 0.6–1.3)
EOSINOPHIL # BLD AUTO: 0.07 THOUSAND/ΜL (ref 0–0.61)
EOSINOPHIL NFR BLD AUTO: 1 % (ref 0–6)
ERYTHROCYTE [DISTWIDTH] IN BLOOD BY AUTOMATED COUNT: 12.1 % (ref 11.6–15.1)
GFR SERPL CREATININE-BSD FRML MDRD: 88 ML/MIN/1.73SQ M
GLUCOSE SERPL-MCNC: 86 MG/DL (ref 65–140)
GLUCOSE UR STRIP-MCNC: NEGATIVE MG/DL
HCT VFR BLD AUTO: 47.1 % (ref 36.5–49.3)
HGB BLD-MCNC: 16.3 G/DL (ref 12–17)
HGB UR QL STRIP.AUTO: NEGATIVE
IMM GRANULOCYTES # BLD AUTO: 0.03 THOUSAND/UL (ref 0–0.2)
IMM GRANULOCYTES NFR BLD AUTO: 0 % (ref 0–2)
INR PPP: 1.03 (ref 0.84–1.19)
KETONES UR STRIP-MCNC: NEGATIVE MG/DL
LEUKOCYTE ESTERASE UR QL STRIP: NEGATIVE
LIPASE SERPL-CCNC: 145 U/L (ref 73–393)
LYMPHOCYTES # BLD AUTO: 3.08 THOUSANDS/ΜL (ref 0.6–4.47)
LYMPHOCYTES NFR BLD AUTO: 44 % (ref 14–44)
MCH RBC QN AUTO: 31.7 PG (ref 26.8–34.3)
MCHC RBC AUTO-ENTMCNC: 34.6 G/DL (ref 31.4–37.4)
MCV RBC AUTO: 92 FL (ref 82–98)
MONOCYTES # BLD AUTO: 0.64 THOUSAND/ΜL (ref 0.17–1.22)
MONOCYTES NFR BLD AUTO: 9 % (ref 4–12)
NEUTROPHILS # BLD AUTO: 3.11 THOUSANDS/ΜL (ref 1.85–7.62)
NEUTS SEG NFR BLD AUTO: 45 % (ref 43–75)
NITRITE UR QL STRIP: NEGATIVE
NRBC BLD AUTO-RTO: 0 /100 WBCS
PH UR STRIP.AUTO: 6.5 [PH] (ref 4.5–8)
PLATELET # BLD AUTO: 321 THOUSANDS/UL (ref 149–390)
PMV BLD AUTO: 10.1 FL (ref 8.9–12.7)
POTASSIUM SERPL-SCNC: 4.2 MMOL/L (ref 3.5–5.3)
PROT SERPL-MCNC: 8.1 G/DL (ref 6.4–8.2)
PROT UR STRIP-MCNC: NEGATIVE MG/DL
PROTHROMBIN TIME: 13.4 SECONDS (ref 11.6–14.5)
RBC # BLD AUTO: 5.15 MILLION/UL (ref 3.88–5.62)
SODIUM SERPL-SCNC: 139 MMOL/L (ref 136–145)
SP GR UR STRIP.AUTO: >=1.03 (ref 1–1.03)
UROBILINOGEN UR QL STRIP.AUTO: 1 E.U./DL
WBC # BLD AUTO: 6.98 THOUSAND/UL (ref 4.31–10.16)

## 2020-07-31 PROCEDURE — 81003 URINALYSIS AUTO W/O SCOPE: CPT

## 2020-07-31 PROCEDURE — 36415 COLL VENOUS BLD VENIPUNCTURE: CPT | Performed by: PHYSICIAN ASSISTANT

## 2020-07-31 PROCEDURE — 83690 ASSAY OF LIPASE: CPT | Performed by: PHYSICIAN ASSISTANT

## 2020-07-31 PROCEDURE — 96374 THER/PROPH/DIAG INJ IV PUSH: CPT

## 2020-07-31 PROCEDURE — 85610 PROTHROMBIN TIME: CPT | Performed by: PHYSICIAN ASSISTANT

## 2020-07-31 PROCEDURE — 85025 COMPLETE CBC W/AUTO DIFF WBC: CPT | Performed by: PHYSICIAN ASSISTANT

## 2020-07-31 PROCEDURE — 96375 TX/PRO/DX INJ NEW DRUG ADDON: CPT

## 2020-07-31 PROCEDURE — 85730 THROMBOPLASTIN TIME PARTIAL: CPT | Performed by: PHYSICIAN ASSISTANT

## 2020-07-31 PROCEDURE — 80053 COMPREHEN METABOLIC PANEL: CPT | Performed by: PHYSICIAN ASSISTANT

## 2020-07-31 PROCEDURE — 99213 OFFICE O/P EST LOW 20 MIN: CPT | Performed by: PHYSICIAN ASSISTANT

## 2020-07-31 PROCEDURE — 74177 CT ABD & PELVIS W/CONTRAST: CPT

## 2020-07-31 PROCEDURE — 99285 EMERGENCY DEPT VISIT HI MDM: CPT | Performed by: EMERGENCY MEDICINE

## 2020-07-31 PROCEDURE — 99284 EMERGENCY DEPT VISIT MOD MDM: CPT

## 2020-07-31 PROCEDURE — 96361 HYDRATE IV INFUSION ADD-ON: CPT

## 2020-07-31 RX ORDER — MORPHINE SULFATE 4 MG/ML
4 INJECTION, SOLUTION INTRAMUSCULAR; INTRAVENOUS ONCE
Status: COMPLETED | OUTPATIENT
Start: 2020-07-31 | End: 2020-07-31

## 2020-07-31 RX ORDER — ONDANSETRON 2 MG/ML
4 INJECTION INTRAMUSCULAR; INTRAVENOUS ONCE
Status: COMPLETED | OUTPATIENT
Start: 2020-07-31 | End: 2020-07-31

## 2020-07-31 RX ADMIN — IOHEXOL 100 ML: 350 INJECTION, SOLUTION INTRAVENOUS at 19:32

## 2020-07-31 RX ADMIN — ONDANSETRON 4 MG: 2 INJECTION INTRAMUSCULAR; INTRAVENOUS at 18:41

## 2020-07-31 RX ADMIN — MORPHINE SULFATE 4 MG: 4 INJECTION INTRAVENOUS at 18:43

## 2020-07-31 RX ADMIN — SODIUM CHLORIDE 1000 ML: 0.9 INJECTION, SOLUTION INTRAVENOUS at 18:40

## 2020-07-31 NOTE — PATIENT INSTRUCTIONS
Abdominal Pain   WHAT YOU NEED TO KNOW:   Abdominal pain can be dull, achy, or sharp  You may have pain in one area of your abdomen, or in your entire abdomen  Your pain may be caused by a condition such as constipation, food sensitivity or poisoning, infection, or a blockage  Abdominal pain can also be from a hernia, appendicitis, or an ulcer  Liver, gallbladder, or kidney conditions can also cause abdominal pain  The cause of your abdominal pain may be unknown  DISCHARGE INSTRUCTIONS:   Return to the emergency department if:   · You have new chest pain or shortness of breath  · You have pulsing pain in your upper abdomen or lower back that suddenly becomes constant  · Your pain is in the right lower abdominal area and worsens with movement  · You have a fever over 100 4°F (38°C) or shaking chills  · You are vomiting and cannot keep food or liquids down  · Your pain does not improve or gets worse over the next 8 to 12 hours  · You see blood in your vomit or bowel movements, or they look black and tarry  · Your skin or the whites of your eyes turn yellow  · You are a woman and have a large amount of vaginal bleeding that is not your monthly period  Contact your healthcare provider if:   · You have pain in your lower back  · You are a man and have pain in your testicles  · You have pain when you urinate  · You have questions or concerns about your condition or care  Follow up with your healthcare provider within 24 hours or as directed:  Write down your questions so you remember to ask them during your visits  Medicines:   · Medicines  may be given to calm your stomach and prevent vomiting or to decrease pain  Ask how to take pain medicine safely  · Take your medicine as directed  Contact your healthcare provider if you think your medicine is not helping or if you have side effects  Tell him of her if you are allergic to any medicine   Keep a list of the medicines, vitamins, and herbs you take  Include the amounts, and when and why you take them  Bring the list or the pill bottles to follow-up visits  Carry your medicine list with you in case of an emergency  © 2017 [employer] Information is for End User's use only and may not be sold, redistributed or otherwise used for commercial purposes  All illustrations and images included in CareNotes® are the copyrighted property of A D [employer] M , Inc  or [employer]  The above information is an  only  It is not intended as medical advice for individual conditions or treatments  Talk to your doctor, nurse or pharmacist before following any medical regimen to see if it is safe and effective for you

## 2020-07-31 NOTE — PROGRESS NOTES
[email or web address] [facility]        NAME: Mike Delgado is a 45 y o  male  : 1975    MRN: [id number]  DATE: 2020  TIME: 5:00 PM    /80   Pulse 87   Temp 97 7 °F (36 5 °C)   Resp 18   Ht 5' 8" (1 727 m)   Wt 100 kg (220 lb 12 8 oz)   SpO2 97%   BMI 33 57 kg/m²     Assessment and Plan   Right lower quadrant abdominal pain [R10 31]  1  Right lower quadrant abdominal pain           Patient Instructions       Follow up with PCP in 3-5 days  Proceed to  ER if symptoms worsen  Chief Complaint     Chief Complaint   Patient presents with    Abdominal Pain     PT started Tuesday with right lower groin pain  Has SOB  Has taken meds to help but has not helped at all  Has not had any trouble passing bowel movements         History of Present Illness       Pt with right abdomen pain and nausea for 2 days     Abdominal Pain   This is a new problem  The current episode started yesterday  The onset quality is gradual  The problem occurs constantly  The problem has been unchanged  The pain is located in the suprapubic region and RLQ  The pain is at a severity of 7/10  The pain is moderate  The quality of the pain is aching and cramping  The abdominal pain does not radiate  Pertinent negatives include no anorexia, arthralgias, belching, constipation, diarrhea, dysuria, fever, flatus, frequency, headaches, hematochezia, hematuria, melena, myalgias, nausea, vomiting or weight loss  Nothing aggravates the pain  The pain is relieved by nothing  He has tried nothing for the symptoms  The treatment provided no relief  Review of Systems   Review of Systems   Constitutional: Negative  Negative for fever and weight loss  HENT: Negative  Eyes: Negative  Respiratory: Negative  Cardiovascular: Negative  Gastrointestinal: Positive for abdominal pain  Negative for anorexia, constipation, diarrhea, flatus, hematochezia, melena, nausea and vomiting  Endocrine: Negative  Genitourinary: Negative  Negative for dysuria, frequency and hematuria  Musculoskeletal: Negative  Negative for arthralgias and myalgias  Skin: Negative  Allergic/Immunologic: Negative  Neurological: Negative  Negative for headaches  Hematological: Negative  Psychiatric/Behavioral: Negative  All other systems reviewed and are negative          Current Medications       Current Outpatient Medications:     albuterol (PROVENTIL HFA,VENTOLIN HFA) 90 mcg/act inhaler, Inhale 2 puffs every 6 (six) hours as needed for wheezing or shortness of breath, Disp: 1 Inhaler, Rfl: 0    ALPRAZolam (XANAX) 0 5 mg tablet, Take 1 tablet (0 5 mg total) by mouth daily as needed for anxiety, Disp: 30 tablet, Rfl: 1    ergocalciferol (VITAMIN D2) 50,000 units, Take 1 capsule (50,000 Units total) by mouth once a week, Disp: 12 capsule, Rfl: 0    hydrochlorothiazide (HYDRODIURIL) 12 5 mg tablet, Take 1 tablet (12 5 mg total) by mouth daily, Disp: 90 tablet, Rfl: 1    pantoprazole (PROTONIX) 40 mg tablet, Take 1 tablet (40 mg total) by mouth daily, Disp: 90 tablet, Rfl: 1    SUMAtriptan (IMITREX) 25 mg tablet, Take 1 tablet (25 mg total) by mouth as needed for migraine, Disp: 30 tablet, Rfl: 1    traMADol (ULTRAM) 50 mg tablet, Take 1 tablet (50 mg total) by mouth every 6 (six) hours as needed (as needed), Disp: 60 tablet, Rfl: 1    venlafaxine (EFFEXOR-XR) 150 mg 24 hr capsule, Take 2 capsules (300 mg total) by mouth daily, Disp: 180 capsule, Rfl: 1    zolpidem (Ambien) 10 mg tablet, Take 1 tablet (10 mg total) by mouth daily at bedtime as needed for sleep, Disp: 30 tablet, Rfl: 3    diclofenac sodium (VOLTAREN) 1 %, APPLY 2 GRAMS TO THE AFFECTED AREA(S) BY TOPICAL ROUTE 4 TIMES PER DAY, Disp: , Rfl:     ibuprofen (MOTRIN) 600 mg tablet, Take 1 tablet (600 mg total) by mouth every 6 (six) hours for 7 days, Disp: 28 tablet, Rfl: 0    Current Allergies     Allergies as of 07/31/2020 - Reviewed 07/31/2020   Allergen Reaction Noted    Lidocaine Anaphylaxis 09/09/2016            The following portions of the patient's history were reviewed and updated as appropriate: allergies, current medications, past family history, past medical history, past social history, past surgical history and problem list      Past Medical History:   Diagnosis Date    Accelerated essential hypertension     Anxiety     Colitis     [facility]VT [facility]deep venous thrombosis) ([facility] ) 2015    Elevated ALT measurement     GERD (gastroesophageal reflux disease)     Gynecomastia 7/16/2019    Headache     Holter monitor, abnormal     Left leg DVT ([facility] ) 10/8/2016    Migraine     PE (pulmonary thromboembolism) ([facility] ) 2015    Pulmonary embolism ([facility] ) 2/23/2017    Sleep apnea     mild    Status post right breast biopsy 9/4/2019    Syncope     Tobacco dependence due to chewing tobacco 7/2/2019       Past Surgical History:   Procedure Laterality Date    BREAST LUMPECTOMY Right     HERNIA REPAIR Right     groin    KNEE ARTHROSCOPY Left     x2    KNEE SURGERY  10/2015    ME BIOPSY OF BREAST, NEEDLE CORE Right 8/22/2019    Procedure: EXCISIONAL BIOPSY RIGHT BREAST MASS;  Surgeon: Angely Stafford MD;  [facility];  Service: General    SHOULDER SURGERY Left     3x    VASECTOMY      WISDOM TOOTH EXTRACTION         Family History   Problem Relation Age of Onset   Holly Moraesy Migraines Mother     Diabetes Mother     Hypertension Father     Diabetes Father     Cancer Sister     Thyroid cancer Sister 28    Cancer Brother     Hypertension Brother     Liver disease Brother         fatty liver    Cancer Maternal Grandfather     Other Maternal Grandfather         history of travel    Hypertension Paternal Grandmother     Diabetes Paternal Grandmother     Hypertension Paternal Grandfather     Cancer Other     Cancer Maternal Aunt          Medications have been verified          Objective   /80   Pulse 87   Temp 97 7 °F (36 5 °C)   Resp 18   Ht 5' 8" (1 727 m)   Wt 100 kg (220 lb 12 8 oz)   SpO2 97%   BMI 33 57 kg/m²        Physical Exam     Physical Exam   Constitutional: He is oriented to person, place, and time  He appears well-developed and well-nourished  Bumps in road on way to office hurt   Down hard on heels +pain    HENT:   Head: Normocephalic  Mouth/Throat: Oropharynx is clear and moist    Eyes: Pupils are equal, round, and reactive to light  EOM are normal    Cardiovascular: Normal rate, regular rhythm, normal heart sounds and intact distal pulses  Pulmonary/Chest: Effort normal and breath sounds normal    Abdominal: Normal appearance and bowel sounds are normal  There is tenderness in the right lower quadrant and suprapubic area  Neurological: He is alert and oriented to person, place, and time  Skin: Skin is warm  Capillary refill takes less than 2 seconds  Psychiatric: He has a normal mood and affect  His behavior is normal    Nursing note and vitals reviewed

## 2020-08-01 NOTE — DISCHARGE INSTRUCTIONS
Please refer to the attached information for strict return instructions  If symptoms worsen or new symptoms develop please return to the ER  Please follow up with general surgery for re-evaluation of prior hernia

## 2020-08-01 NOTE — ED PROVIDER NOTES
History  Chief Complaint   Patient presents with    Abdominal Pain     Patient reports rlq abdominal pain x2 days  Pt reports nausea and SOB  Patient seen at urgent care and told to come to ED for possible apendicitis  Miriam Street is a 46 yo M presenting with RLQ abdominal pain for the past two days  States pain occasionally radiates into R side of groin/R thigh  Reports pain is dull, aching, and gradually worsening since onset  Patient also describes associated nausea without vomiting  Pt reports pain seems to worsen with bearing down and with coughing, as well as with certain movements  Denies radiation pain to back or flank  Denies fevers or chills  Denies dysuria, urinary urgency, urinary frequency, or hematuria  Denies testicular/scrotal swelling or pain  Patient does report removed history of right-sided inguinal hernia which he reports was repaired approximately 20 years ago  History provided by:  Patient   used: No    Abdominal Pain   Pain location:  RLQ  Pain quality: aching and dull    Pain radiates to:  Groin  Duration:  2 days  Timing:  Constant  Progression:  Worsening  Chronicity:  New  Context: not recent illness, not sick contacts and not trauma    Relieved by:  None tried  Worsened by: Movement and palpation  Ineffective treatments:  None tried  Associated symptoms: nausea    Associated symptoms: no chest pain, no chills, no constipation, no cough, no diarrhea, no dysuria, no fever, no hematuria, no melena, no shortness of breath, no sore throat and no vomiting    Risk factors: has not had multiple surgeries and no NSAID use        Prior to Admission Medications   Prescriptions Last Dose Informant Patient Reported? Taking?    ALPRAZolam (XANAX) 0 5 mg tablet   No Yes   Sig: Take 1 tablet (0 5 mg total) by mouth daily as needed for anxiety   SUMAtriptan (IMITREX) 25 mg tablet  Self No Yes   Sig: Take 1 tablet (25 mg total) by mouth as needed for migraine   albuterol (PROVENTIL HFA,VENTOLIN HFA) 90 mcg/act inhaler   No Yes   Sig: Inhale 2 puffs every 6 (six) hours as needed for wheezing or shortness of breath   diclofenac sodium (VOLTAREN) 1 % Not Taking at Unknown time Self Yes No   Sig: APPLY 2 GRAMS TO THE AFFECTED AREA(S) BY TOPICAL ROUTE 4 TIMES PER DAY   ergocalciferol (VITAMIN D2) 50,000 units   No Yes   Sig: Take 1 capsule (50,000 Units total) by mouth once a week   hydrochlorothiazide (HYDRODIURIL) 12 5 mg tablet 7/31/2020 at Unknown time  No Yes   Sig: Take 1 tablet (12 5 mg total) by mouth daily   pantoprazole (PROTONIX) 40 mg tablet 7/31/2020 at Unknown time  No Yes   Sig: Take 1 tablet (40 mg total) by mouth daily   traMADol (ULTRAM) 50 mg tablet   No Yes   Sig: Take 1 tablet (50 mg total) by mouth every 6 (six) hours as needed (as needed)   venlafaxine (EFFEXOR-XR) 150 mg 24 hr capsule 7/31/2020 at Unknown time  No Yes   Sig: Take 2 capsules (300 mg total) by mouth daily   zolpidem (Ambien) 10 mg tablet   No Yes   Sig: Take 1 tablet (10 mg total) by mouth daily at bedtime as needed for sleep      Facility-Administered Medications: None       Past Medical History:   Diagnosis Date    Accelerated essential hypertension     Anxiety     Colitis     DVT (deep venous thrombosis) (HCC) 2015    Elevated ALT measurement     GERD (gastroesophageal reflux disease)     Gynecomastia 7/16/2019    Headache     Holter monitor, abnormal     Left leg DVT (HCC) 10/8/2016    Migraine     PE (pulmonary thromboembolism) ([facility] [address] [facility] ) 2015    Pulmonary embolism ([facility] [facility] ) 2/23/2017    Sleep apnea     mild    Status post right breast biopsy 9/4/2019    Syncope     Tobacco dependence due to chewing tobacco 7/2/2019       Past Surgical History:   Procedure Laterality Date    BREAST LUMPECTOMY Right     HERNIA REPAIR Right     groin    KNEE ARTHROSCOPY Left     x2    KNEE SURGERY  10/2015    [place] BIOPSY OF BREAST, NEEDLE CORE Right 8/22/2019    Procedure: EXCISIONAL BIOPSY RIGHT BREAST MASS;  Surgeon: Ronen oLw MD;  Location: [facility];  Service: General    SHOULDER SURGERY Left     3x    VASECTOMY      WISDOM TOOTH EXTRACTION         Family History   Problem Relation Age of Onset   Becca Linares Migraines Mother     Diabetes Mother     Hypertension Father     Diabetes Father     Cancer Sister     Thyroid cancer Sister 28    Cancer Brother     Hypertension Brother     Liver disease Brother         fatty liver    Cancer Maternal Grandfather     Other Maternal Grandfather         history of travel    Hypertension Paternal Grandmother     Diabetes Paternal Grandmother     Hypertension Paternal Grandfather     Cancer Other     Cancer Maternal Aunt      I have reviewed and agree with the history as documented  E-Cigarette/Vaping    E-Cigarette Use Never User      E-Cigarette/Vaping Substances     Social History     Tobacco Use    Smoking status: Never Smoker    Smokeless tobacco: Current User     Types: Chew    Tobacco comment: chews daily   Substance Use Topics    Alcohol use: Not Currently     Frequency: Monthly or less     Drinks per session: 1 or 2     Comment: occassional    Drug use: No       Review of Systems   Constitutional: Negative for chills and fever  HENT: Negative for congestion, rhinorrhea and sore throat  Eyes: Negative for pain and visual disturbance  Respiratory: Negative for cough, shortness of breath and wheezing  Cardiovascular: Negative for chest pain and palpitations  Gastrointestinal: Positive for abdominal pain and nausea  Negative for blood in stool, constipation, diarrhea, melena and vomiting  Genitourinary: Negative for dysuria, flank pain, frequency, hematuria, penile swelling, scrotal swelling, testicular pain and urgency  Musculoskeletal: Negative for back pain, neck pain and neck stiffness  Skin: Negative for rash and wound  Neurological: Negative for dizziness, weakness, light-headedness and numbness         Physical Exam  Physical Exam   Constitutional: He is oriented to person, place, and time  He appears well-developed and well-nourished  Non-toxic appearance  No distress  HENT:   Head: Normocephalic and atraumatic  Right Ear: External ear normal    Left Ear: External ear normal    Mouth/Throat: Oropharynx is clear and moist    Eyes: Pupils are equal, round, and reactive to light  Conjunctivae and EOM are normal    Neck: Normal range of motion  Neck supple  Cardiovascular: Normal rate, regular rhythm, normal heart sounds and intact distal pulses  Exam reveals no gallop and no friction rub  No murmur heard  Pulmonary/Chest: Effort normal and breath sounds normal  No respiratory distress  He has no wheezes  Abdominal: Soft  He exhibits no distension  There is tenderness  There is tenderness at McBurney's point  There is no rigidity, no rebound, no guarding, no CVA tenderness and negative Sun's sign  Hernia confirmed negative in the right inguinal area and confirmed negative in the left inguinal area  RLQ TTP  No rigidity, rebound, or guarding  +psoas sign  -obturator  No CVAT  Negative [place]  Genitourinary: Testes normal and penis normal  Cremasteric reflex is present  Right testis shows no mass, no swelling and no tenderness  Left testis shows no mass, no swelling and no tenderness  Genitourinary Comments: No appreciable inguinal or femoral hernia  No testicular/scrotal tenderness, erythema, or swelling  Lymphadenopathy:     He has no cervical adenopathy  Neurological: He is alert and oriented to person, place, and time  He exhibits normal muscle tone  Coordination normal    Skin: Skin is warm and dry  Capillary refill takes less than 2 seconds  No rash noted  He is not diaphoretic  No erythema  Psychiatric: He has a normal mood and affect   His behavior is normal  Judgment and thought content normal        Vital Signs  ED Triage Vitals [07/31/20 1746]   Temperature Pulse Respirations Blood Pressure SpO2   97 7 °F (36 5 °C) 96 16 127/85 98 %      Temp Source Heart Rate Source Patient Position - Orthostatic VS BP Location FiO2 (%)   Temporal Monitor Sitting Right arm --      Pain Score       6           Vitals:    07/31/20 1746 07/31/20 1900 07/31/20 2024   BP: 127/85 140/80 118/70   Pulse: 96 87 73   Patient Position - Orthostatic VS: Sitting Lying Lying         Visual Acuity      ED Medications  Medications   morphine (PF) 4 mg/mL injection 4 mg (4 mg Intravenous Given 7/31/20 1843)   sodium chloride 0 9 % bolus 1,000 mL (0 mL Intravenous Stopped 7/31/20 1959)   ondansetron (ZOFRAN) injection 4 mg (4 mg Intravenous Given 7/31/20 1841)   iohexol (OMNIPAQUE) 350 MG/ML injection (SINGLE-DOSE) 100 mL (100 mL Intravenous Given 7/31/20 1932)       Diagnostic Studies  Results Reviewed     Procedure Component Value Units Date/Time    APTT [[id number]]  (Normal) Collected:  07/31/20 1902    Lab Status:  Final result Specimen:  Blood from Arm, Right Updated:  07/31/20 1931     PTT 28 seconds     Protime-INR [[id number]]  (Normal) Collected:  07/31/20 1902    Lab Status:  Final result Specimen:  Blood from Arm, Right Updated:  07/31/20 1931     Protime 13 4 seconds      INR 1 03    Comprehensive metabolic panel [[id number]]  (Abnormal) Collected:  07/31/20 1840    Lab Status:  Final result Specimen:  Blood from Arm, Right Updated:  07/31/20 1912     Sodium 139 mmol/L      Potassium 4 2 mmol/L      Chloride 102 mmol/L      CO2 25 mmol/L      ANION GAP 12 mmol/L      BUN 15 mg/dL      Creatinine 1 04 mg/dL      Glucose 86 mg/dL      Calcium 8 8 mg/dL      AST 66 U/L       U/L      Alkaline Phosphatase 85 U/L      Total Protein 8 1 g/dL      Albumin 4 3 g/dL      Total Bilirubin 0 73 mg/dL      eGFR 88 ml/min/1 73sq m     Narrative:       [place] guidelines for Chronic Kidney Disease (CKD):     Stage 1 with normal or high GFR (GFR > 90 mL/min/1 73 square meters)    Stage 2 Mild CKD (GFR = 60-89 mL/min/1 73 square meters)    Stage 3A Moderate CKD (GFR = 45-59 mL/min/1 73 square meters)    Stage 3B Moderate CKD (GFR = 30-44 mL/min/1 73 square meters)    Stage 4 Severe CKD (GFR = 15-29 mL/min/1 73 square meters)    Stage 5 End Stage CKD (GFR <15 mL/min/1 73 square meters)  Note: GFR calculation is accurate only with a steady state creatinine    Lipase [[id number]]  (Normal) Collected:  07/31/20 1840    Lab Status:  Final result Specimen:  Blood from Arm, Right Updated:  07/31/20 1912     Lipase 145 u/L     CBC and differential [[id number]] Collected:  07/31/20 1840    Lab Status:  Final result Specimen:  Blood from Arm, Right Updated:  07/31/20 1850     WBC 6 98 Thousand/uL      RBC 5 15 Million/uL      Hemoglobin 16 3 g/dL      Hematocrit 47 1 %      MCV 92 fL      MCH 31 7 pg      MCHC 34 6 g/dL      RDW 12 1 %      MPV 10 1 fL      Platelets 808 Thousands/uL      nRBC 0 /100 WBCs      Neutrophils Relative 45 %      Immat GRANS % 0 %      Lymphocytes Relative 44 %      Monocytes Relative 9 %      Eosinophils Relative 1 %      Basophils Relative 1 %      Neutrophils Absolute 3 11 Thousands/µL      Immature Grans Absolute 0 03 Thousand/uL      Lymphocytes Absolute 3 08 Thousands/µL      Monocytes Absolute 0 64 Thousand/µL      Eosinophils Absolute 0 07 Thousand/µL      Basophils Absolute 0 05 Thousands/µL     POCT urinalysis dipstick [[id number]]  (Normal) Resulted:  07/31/20 1824    Lab Status:  Final result Updated:  07/31/20 1824     Color, UA yellow    Urine Macroscopic, POC [[id number]] Collected:  07/31/20 1819    Lab Status:  Final result Specimen:  Urine Updated:  07/31/20 1820     Color, UA Yellow     Clarity, UA Clear     pH, UA 6 5     Leukocytes, UA Negative     Nitrite, UA Negative     Protein, UA Negative mg/dl      Glucose, UA Negative mg/dl      Ketones, UA Negative mg/dl      Urobilinogen, UA 1 0 E U /dl      Bilirubin, UA Negative     Blood, UA Negative     Specific Gravity, UA >=1 030    Narrative:       CLINITEK RESULT                 CT abdomen pelvis with contrast   Final Result by Maribell Reyes MD (07/31 1947)      Normal appendix  No inflammatory changes in the right lower quadrant  Hepatic steatosis and hepatomegaly  Splenomegaly  Workstation performed: [id number]                    Procedures  Procedures         ED Course       US AUDIT      Most Recent Value   Initial Alcohol Screen: US AUDIT-C    1  How often do you have a drink containing alcohol?  0 Filed at: 07/31/2020 1955   2  How many drinks containing alcohol do you have on a typical day you are drinking? 0 Filed at: 07/31/2020 1955   3a  Male UNDER 65: How often do you have five or more drinks on one occasion? 0 Filed at: 07/31/2020 1955   Audit-C Score  0 Filed at: 07/31/2020 1955                  ANU/DAST-10      Most Recent Value   How many times in the past year have you    Used an illegal drug or used a prescription medication for non-medical reasons? Never Filed at: 07/31/2020 1954                                MDM  Number of Diagnoses or Management Options  Right groin pain:   Right lower quadrant abdominal pain:   Diagnosis management comments: Two days of right lower quadrant abdominal pain with occasional radiation into right groin/right thigh  Remote prior history of right-sided inguinal hernia with surgical repair  Patient with right lower quadrant tenderness on exam, no peritoneal signs  Testicular/scrotal exam is benign, no appreciable hernia on exam   Patient is afebrile, nontoxic, no acute distress  Will check abdominal labs, provide pain control and fluids, check CT abdomen/pelvis   ---------------------------  Panic steatosis/hepatomegaly noted on CT  No evidence of acute appendicitis  Basic labs are grossly within normal limits  No evidence of urinary tract infection    Suspect patient may have recurrence of inguinal/femoral hernia on right side given history, but not appreciated on my exam   No evidence of active herniation or strangulation on CT  Pt is stable for discharge, pain significantly improved since presentation  Strict return indications discussed  Amount and/or Complexity of Data Reviewed  Clinical lab tests: ordered and reviewed  Tests in the radiology section of CPT®: ordered and reviewed    Patient Progress  Patient progress: stable        Disposition  Final diagnoses:   Right lower quadrant abdominal pain   Right groin pain     Time reflects when diagnosis was documented in both MDM as applicable and the Disposition within this note     Time User Action Codes Description Comment    7/31/2020  8:09 PM Dallas Gear Add [R10 31] Right lower quadrant abdominal pain     7/31/2020  8:09 PM Dallas Gear Add [R10 31] Right groin pain       ED Disposition     ED Disposition Condition Date/Time Comment    Discharge Stable Fri Jul 31, 2020  8:09 PM Mark Carrillo discharge to home/self care              Follow-up Information     Follow up With Specialties Details Why Contact Info Additional Information    [facility] General Surgery [facility]  For re-evaluation of possible hernia [address]  [id number] [place], [place] [address], [place] [address], [place], [facility] [address]           Discharge Medication List as of 7/31/2020  8:10 PM      CONTINUE these medications which have NOT CHANGED    Details   albuterol (PROVENTIL HFA,VENTOLIN HFA) 90 mcg/act inhaler Inhale 2 puffs every 6 (six) hours as needed for wheezing or shortness of breath, Starting Thu 6/11/2020, Normal      ALPRAZolam (XANAX) 0 5 mg tablet Take 1 tablet (0 5 mg total) by mouth daily as needed for anxiety, Starting Thu 6/11/2020, Normal      ergocalciferol (VITAMIN D2) 50,000 units Take 1 capsule (50,000 Units total) by mouth once a week, Starting Mon 6/22/2020, Normal hydrochlorothiazide (HYDRODIURIL) 12 5 mg tablet Take 1 tablet (12 5 mg total) by mouth daily, Starting Thu 6/11/2020, Normal      pantoprazole (PROTONIX) 40 mg tablet Take 1 tablet (40 mg total) by mouth daily, Starting Thu 6/11/2020, Normal      SUMAtriptan (IMITREX) 25 mg tablet Take 1 tablet (25 mg total) by mouth as needed for migraine, Starting Tue 12/17/2019, Normal      traMADol (ULTRAM) 50 mg tablet Take 1 tablet (50 mg total) by mouth every 6 (six) hours as needed (as needed), Starting Thu 6/11/2020, Normal      venlafaxine (EFFEXOR-XR) 150 mg 24 hr capsule Take 2 capsules (300 mg total) by mouth daily, Starting Thu 6/11/2020, Normal      zolpidem (Ambien) 10 mg tablet Take 1 tablet (10 mg total) by mouth daily at bedtime as needed for sleep, Starting Thu 6/11/2020, Normal      diclofenac sodium (VOLTAREN) 1 % APPLY 2 GRAMS TO THE AFFECTED AREA(S) BY TOPICAL ROUTE 4 TIMES PER DAY, Historical Med           No discharge procedures on file      PDMP Review     None          ED Provider  Electronically Signed by           Jil Meadows PA-C  07/31/20 5069

## 2020-08-03 ENCOUNTER — VBI (OUTPATIENT)
Dept: INTERNAL MEDICINE CLINIC | Facility: CLINIC | Age: 45
End: 2020-08-03

## 2020-08-03 NOTE — TELEPHONE ENCOUNTER
Rob Pittman    ED Visit Information     Ed visit date: 7/31/20  Diagnosis Description: Right lower quadrant abdominal pain; Right groin pain  In Network? Yes [facility] [place] [facility]  Discharge status: Home  Discharged with meds ? No  Number of ED visits to date: 2  ED Severity:N/A     Outreach Information    Outreach successful: Yes 2  Date letter mailed:0  Date Finalized:8/4/20    Care Coordination    Follow up appointment with specialilty: no patient states he will call general surgery and schedule an appointment   Transportation issues ? No    Value [employer] type:  3 Day Outreach  Emergent necessity warranted by diagnosis:  Yes  [facility] PCP:  Yes  Transportation:  Friend/Family Transport  Called PCP first?:  No  Feels able to call PCP for urgent problems ?:  Yes  Understands what emergencies can be handled by PCP ?:  Yes  Ever any problems getting appointment with PCP for minor emergency/urgency problems?:  No  Practice Contacted Patient ?:  No  Pt had ED follow up with pcp/staff ?:  No    Seen for follow-up out of network ?:  No  Reason Patient went to ED instead of Urgent Care or PCP?:  Pt referred by Urgent Care  Urgent care Education?:  No  08/03/2020 12:50 PM Phone (VBI) Paras Oconnor (Self) [phone] (M) Remove  Left Message - attempt 1  By [address] communication with patient regarding recent ED visit on 7/31/20  Patient stated that he is doing okay but is still having some pain   Patient declined PCP follow-up at this time  Patient does not meet OPCM criteria  Patient is aware of PCP after hour's on-call service, education not given  Patient was aware of her nearest [place] [address] urgent care facility, education not given  Patient does feel comfortable contacting PCP office for medical advice and does not have issues with getting a [address] [facility] or next day appointment

## 2020-10-15 ENCOUNTER — OFFICE VISIT (OUTPATIENT)
Dept: INTERNAL MEDICINE CLINIC | Facility: CLINIC | Age: 45
End: 2020-10-15
Payer: COMMERCIAL

## 2020-10-15 VITALS
OXYGEN SATURATION: 98 % | TEMPERATURE: 98.3 F | DIASTOLIC BLOOD PRESSURE: 82 MMHG | WEIGHT: 219.4 LBS | SYSTOLIC BLOOD PRESSURE: 120 MMHG | HEIGHT: 68 IN | HEART RATE: 88 BPM | BODY MASS INDEX: 33.25 KG/M2

## 2020-10-15 DIAGNOSIS — E55.9 VITAMIN D DEFICIENCY: ICD-10-CM

## 2020-10-15 DIAGNOSIS — F51.01 PRIMARY INSOMNIA: ICD-10-CM

## 2020-10-15 DIAGNOSIS — I51.89 GRADE I DIASTOLIC DYSFUNCTION: ICD-10-CM

## 2020-10-15 DIAGNOSIS — R06.02 SOB (SHORTNESS OF BREATH): ICD-10-CM

## 2020-10-15 DIAGNOSIS — Z77.22 SECOND HAND SMOKE EXPOSURE: ICD-10-CM

## 2020-10-15 DIAGNOSIS — F41.9 ANXIETY: ICD-10-CM

## 2020-10-15 DIAGNOSIS — G43.109 MIGRAINE WITH AURA AND WITHOUT STATUS MIGRAINOSUS, NOT INTRACTABLE: ICD-10-CM

## 2020-10-15 DIAGNOSIS — J45.20 MILD INTERMITTENT EXTRINSIC ASTHMA WITHOUT COMPLICATION: ICD-10-CM

## 2020-10-15 DIAGNOSIS — R06.83 SNORING: ICD-10-CM

## 2020-10-15 DIAGNOSIS — G47.33 OSA (OBSTRUCTIVE SLEEP APNEA): ICD-10-CM

## 2020-10-15 DIAGNOSIS — R76.0 LUPUS ANTICOAGULANT POSITIVE: ICD-10-CM

## 2020-10-15 DIAGNOSIS — R06.02 SOB (SHORTNESS OF BREATH) ON EXERTION: Primary | ICD-10-CM

## 2020-10-15 PROBLEM — R94.2 OBSTRUCTIVE PATTERN PRESENT ON PULMONARY FUNCTION TESTING: Status: ACTIVE | Noted: 2020-10-15

## 2020-10-15 PROCEDURE — 99214 OFFICE O/P EST MOD 30 MIN: CPT | Performed by: FAMILY MEDICINE

## 2020-10-15 PROCEDURE — 94640 AIRWAY INHALATION TREATMENT: CPT

## 2020-10-15 PROCEDURE — 3079F DIAST BP 80-89 MM HG: CPT | Performed by: FAMILY MEDICINE

## 2020-10-15 PROCEDURE — 3074F SYST BP LT 130 MM HG: CPT | Performed by: FAMILY MEDICINE

## 2020-10-15 PROCEDURE — 1036F TOBACCO NON-USER: CPT | Performed by: FAMILY MEDICINE

## 2020-10-15 RX ORDER — ZOLPIDEM TARTRATE 10 MG/1
10 TABLET ORAL
Qty: 30 TABLET | Refills: 3 | Status: SHIPPED | OUTPATIENT
Start: 2020-10-15 | End: 2021-04-08 | Stop reason: SDUPTHER

## 2020-10-15 RX ORDER — TRAMADOL HYDROCHLORIDE 50 MG/1
50 TABLET ORAL EVERY 6 HOURS PRN
Qty: 60 TABLET | Refills: 1 | Status: SHIPPED | OUTPATIENT
Start: 2020-10-15 | End: 2021-04-08 | Stop reason: SDUPTHER

## 2020-10-15 RX ORDER — ALPRAZOLAM 0.5 MG/1
0.5 TABLET ORAL DAILY PRN
Qty: 30 TABLET | Refills: 1 | Status: SHIPPED | OUTPATIENT
Start: 2020-10-15 | End: 2021-04-08 | Stop reason: SDUPTHER

## 2020-10-15 RX ORDER — ALBUTEROL SULFATE 2.5 MG/3ML
2.5 SOLUTION RESPIRATORY (INHALATION) ONCE
Status: COMPLETED | OUTPATIENT
Start: 2020-10-15 | End: 2020-10-15

## 2020-10-15 RX ADMIN — ALBUTEROL SULFATE 2.5 MG: 2.5 SOLUTION RESPIRATORY (INHALATION) at 16:58

## 2020-10-28 ENCOUNTER — TELEPHONE (OUTPATIENT)
Dept: INTERNAL MEDICINE CLINIC | Facility: CLINIC | Age: 45
End: 2020-10-28

## 2020-11-09 ENCOUNTER — TELEMEDICINE (OUTPATIENT)
Dept: INTERNAL MEDICINE CLINIC | Facility: CLINIC | Age: 45
End: 2020-11-09
Payer: COMMERCIAL

## 2020-11-09 VITALS — DIASTOLIC BLOOD PRESSURE: 104 MMHG | TEMPERATURE: 97.7 F | SYSTOLIC BLOOD PRESSURE: 137 MMHG

## 2020-11-09 DIAGNOSIS — U07.1 COVID-19 VIRUS INFECTION: Primary | ICD-10-CM

## 2020-11-09 DIAGNOSIS — R94.2 OBSTRUCTIVE PATTERN PRESENT ON PULMONARY FUNCTION TESTING: ICD-10-CM

## 2020-11-09 PROCEDURE — 99213 OFFICE O/P EST LOW 20 MIN: CPT | Performed by: INTERNAL MEDICINE

## 2020-11-09 PROCEDURE — 3075F SYST BP GE 130 - 139MM HG: CPT | Performed by: INTERNAL MEDICINE

## 2020-11-09 PROCEDURE — 1036F TOBACCO NON-USER: CPT | Performed by: INTERNAL MEDICINE

## 2020-11-09 PROCEDURE — 3080F DIAST BP >= 90 MM HG: CPT | Performed by: INTERNAL MEDICINE

## 2020-11-09 RX ORDER — PREDNISONE 20 MG/1
40 TABLET ORAL DAILY
Qty: 10 TABLET | Refills: 0 | Status: SHIPPED | OUTPATIENT
Start: 2020-11-09 | End: 2020-11-14

## 2020-11-12 ENCOUNTER — TELEPHONE (OUTPATIENT)
Dept: INTERNAL MEDICINE CLINIC | Facility: CLINIC | Age: 45
End: 2020-11-12

## 2020-12-14 DIAGNOSIS — F41.9 ANXIETY: ICD-10-CM

## 2020-12-14 RX ORDER — VENLAFAXINE HYDROCHLORIDE 150 MG/1
300 CAPSULE, EXTENDED RELEASE ORAL DAILY
Qty: 180 CAPSULE | Refills: 1 | Status: SHIPPED | OUTPATIENT
Start: 2020-12-14 | End: 2021-03-02 | Stop reason: SDUPTHER

## 2021-01-16 DIAGNOSIS — I10 ESSENTIAL HYPERTENSION: ICD-10-CM

## 2021-01-18 RX ORDER — HYDROCHLOROTHIAZIDE 12.5 MG/1
TABLET ORAL
Qty: 90 TABLET | Refills: 1 | OUTPATIENT
Start: 2021-01-18

## 2021-03-02 ENCOUNTER — OFFICE VISIT (OUTPATIENT)
Dept: INTERNAL MEDICINE CLINIC | Facility: CLINIC | Age: 46
End: 2021-03-02
Payer: COMMERCIAL

## 2021-03-02 DIAGNOSIS — B34.9 VIRAL ILLNESS: ICD-10-CM

## 2021-03-02 DIAGNOSIS — K21.9 GASTROESOPHAGEAL REFLUX DISEASE WITHOUT ESOPHAGITIS: ICD-10-CM

## 2021-03-02 DIAGNOSIS — F41.9 ANXIETY: ICD-10-CM

## 2021-03-02 DIAGNOSIS — F41.1 GENERALIZED ANXIETY DISORDER: ICD-10-CM

## 2021-03-02 DIAGNOSIS — I10 ESSENTIAL HYPERTENSION: ICD-10-CM

## 2021-03-02 DIAGNOSIS — R53.83 FATIGUE, UNSPECIFIED TYPE: Primary | ICD-10-CM

## 2021-03-02 PROCEDURE — 1036F TOBACCO NON-USER: CPT | Performed by: NURSE PRACTITIONER

## 2021-03-02 PROCEDURE — 99214 OFFICE O/P EST MOD 30 MIN: CPT | Performed by: NURSE PRACTITIONER

## 2021-03-02 PROCEDURE — U0005 INFEC AGEN DETEC AMPLI PROBE: HCPCS | Performed by: NURSE PRACTITIONER

## 2021-03-02 PROCEDURE — U0003 INFECTIOUS AGENT DETECTION BY NUCLEIC ACID (DNA OR RNA); SEVERE ACUTE RESPIRATORY SYNDROME CORONAVIRUS 2 (SARS-COV-2) (CORONAVIRUS DISEASE [COVID-19]), AMPLIFIED PROBE TECHNIQUE, MAKING USE OF HIGH THROUGHPUT TECHNOLOGIES AS DESCRIBED BY CMS-2020-01-R: HCPCS | Performed by: NURSE PRACTITIONER

## 2021-03-02 PROCEDURE — 3725F SCREEN DEPRESSION PERFORMED: CPT | Performed by: NURSE PRACTITIONER

## 2021-03-02 RX ORDER — VENLAFAXINE HYDROCHLORIDE 150 MG/1
300 CAPSULE, EXTENDED RELEASE ORAL DAILY
Qty: 90 CAPSULE | Refills: 1 | Status: SHIPPED | OUTPATIENT
Start: 2021-03-02 | End: 2021-06-17 | Stop reason: SDUPTHER

## 2021-03-02 RX ORDER — HYDROCHLOROTHIAZIDE 12.5 MG/1
12.5 TABLET ORAL DAILY
Qty: 90 TABLET | Refills: 1 | Status: SHIPPED | OUTPATIENT
Start: 2021-03-02 | End: 2021-09-09 | Stop reason: SDUPTHER

## 2021-03-02 RX ORDER — PANTOPRAZOLE SODIUM 40 MG/1
40 TABLET, DELAYED RELEASE ORAL DAILY
Qty: 90 TABLET | Refills: 1 | Status: SHIPPED | OUTPATIENT
Start: 2021-03-02 | End: 2021-09-09 | Stop reason: SDUPTHER

## 2021-03-02 NOTE — LETTER
[address] [address]  Phone#  [phone]  Fax#  [phone]    March 2, 2021     Patient: Delvin Rondon   YOB: 1975   Date of Visit: 3/2/2021     To whom it may concern,     Delvin Rondon is under my care and has one or more chronic conditions that has been shown to increase his risk for severe illness from COVID-19  The CDC recommends that employers protect employees at higher risk for severe illness through supportive policies and practices which can be reviewed via the link below:     [employer] no    We recommend that all appropriate precautions be taken to keep this patient safe and reduce their risk, including but not limited to    · Promote social distancing, universal mask wearing when in proximity of others,hand hygiene, cleaning of surfaces and equipment and screening of all individuals in the workplace for symptoms and fever  · Ensure that other businesses and employers sharing the same workspace follow this guidance  · Support options for remote work, if available and appropriate  · Consider offering duties that minimize contact with other individuals, if the patient agrees to this     If these precautions cannot be observed or maintained due to the nature of the work environment, please consider reasonable accommodations for an alternate and flexible work environment       Sincerely,        ANDREW Diamond

## 2021-03-02 NOTE — PROGRESS NOTES
Virtual Regular Visit      Assessment/Plan:    Problem List Items Addressed This Visit        Digestive    Gastroesophageal reflux disease without esophagitis     Refill pantoprazole         Relevant Medications    pantoprazole (PROTONIX) 40 mg tablet       Cardiovascular and Mediastinum    Essential hypertension     Refill HCTZ  Follow up with PCP          Relevant Medications    hydrochlorothiazide (HYDRODIURIL) 12 5 mg tablet       Other    Viral illness     Will check COVID test, quarantine measures reviewed  Will check CBC, CMP, TSH also  F/u based on symptoms/ results   Relevant Orders    Novel Coronavirus (COVID-19), PCR [facility] Collected at [facility] or [facility]    Generalized anxiety disorder     Refill Effexor  Follow up with PCP          Relevant Medications    venlafaxine (EFFEXOR-XR) 150 mg 24 hr capsule    Fatigue - Primary     Check labs and COVID testing  F/u based on results  Relevant Orders    CBC and differential    Comprehensive metabolic panel    TSH, 3rd generation with Free T4 reflex      Other Visit Diagnoses     Anxiety        Relevant Medications    venlafaxine (EFFEXOR-XR) 150 mg 24 hr capsule          BMI Counseling: There is no height or weight on file to calculate BMI  The BMI is above normal  Nutrition recommendations include encouraging healthy choices of fruits and vegetables and consuming healthier snacks  Reason for visit is   Chief Complaint   Patient presents with    Fatigue     -tired/headache/migraines, congestion  Has had congestion and fatigue for a while and the headaches for a few days   Had COVID in October    Medication Refill     has been out of a few meds for a while that he hasnt been taking        Encounter provider ANDREW Lee    Provider located at [address] [place] [address] [address] [facility] [id number]      Recent Visits  No visits were found meeting these conditions  Showing recent visits within past 7 days and meeting all other requirements     Today's Visits  Date Type Provider Dept   03/02/21 Office Visit [address], CRNP [facility]   Showing today's visits and meeting all other requirements     Future Appointments  No visits were found meeting these conditions  Showing future appointments within next 150 days and meeting all other requirements        The patient was identified by name and date of birth  Marjamisonguido Tobars was informed that this is a telemedicine visit and that the visit is being conducted through [address] and patient was informed that this is not a secure, HIPAA-compliant platform  He agrees to proceed     My office door was closed  No one else was in the room  He acknowledged consent and understanding of privacy and security of the video platform  The patient has agreed to participate and understands they can discontinue the visit at any time  Patient is aware this is a billable service  Rainer Carroll is a 39 y o  male    Patient presents for an acute visit  He reports worsening fatigue x 3 days  He has headache, sleeping all day, sinus pain/pressure  He had covid in October and feels like his sinuses have been affected since then  He denies cough, N/V/D, constipation, abdominal pain  He has SOB, but this is chronic and has been worked up since before he had [facility]  He reports mild loss of sense of taste, no change in sense of smell  He reports a hx of chronic migraines  His headache started as a migraine, has improved, but is still mildly lingering, per patient  Patient has been out of protonix for about a month  His heartburn is worsening/bad  He has been out of his HCTZ for about a month due to having to reschedule follow up with PCP  He is doing well on current dosing of Effexor but needs refill  Fatigue  This is a new problem  The current episode started in the past 7 days  The problem occurs constantly  The problem has been unchanged  Associated symptoms include diaphoresis (chronic), fatigue, headaches and numbness (down arms, chronic)  Pertinent negatives include no abdominal pain, anorexia, arthralgias, change in bowel habit, chest pain, chills, congestion, coughing, fever, myalgias, nausea, neck pain, rash, sore throat, swollen glands, urinary symptoms, vertigo or vomiting  Nothing aggravates the symptoms  He has tried rest and sleep for the symptoms  Medication Refill  Associated symptoms include diaphoresis (chronic), fatigue, headaches and numbness (down arms, chronic)  Pertinent negatives include no abdominal pain, anorexia, arthralgias, change in bowel habit, chest pain, chills, congestion, coughing, fever, myalgias, nausea, neck pain, rash, sore throat, swollen glands, urinary symptoms, vertigo or vomiting          Past Medical History:   Diagnosis Date    Accelerated essential hypertension     Anxiety     Colitis     COVID-19 10/2020    DVT (deep venous thrombosis) (HCC) 2015    Elevated ALT measurement     GERD (gastroesophageal reflux disease)     Gynecomastia 7/16/2019    Headache     Holter monitor, abnormal     Left leg DVT ([facility] ) 10/8/2016    Migraine     PE (pulmonary thromboembolism) ([facility] ) 2015    Pulmonary embolism ([facility] ) 2/23/2017    Sleep apnea     mild    Status post right breast biopsy 9/4/2019    Syncope     Tobacco dependence due to chewing tobacco 7/2/2019       Past Surgical History:   Procedure Laterality Date    BREAST LUMPECTOMY Right     HERNIA REPAIR Right     groin    KNEE ARTHROSCOPY Left     x2    KNEE SURGERY  10/2015    LA BIOPSY OF BREAST, NEEDLE CORE Right 8/22/2019    Procedure: EXCISIONAL BIOPSY RIGHT BREAST MASS;  Surgeon: Dixie Hanna MD;  [facility];  Service: General    SHOULDER SURGERY Left     3x    VASECTOMY      WISDOM TOOTH EXTRACTION Current Outpatient Medications   Medication Sig Dispense Refill    albuterol (PROVENTIL HFA,VENTOLIN HFA) 90 mcg/act inhaler Inhale 2 puffs every 6 (six) hours as needed for wheezing or shortness of breath 1 Inhaler 0    ALPRAZolam (XANAX) 0 5 mg tablet Take 1 tablet (0 5 mg total) by mouth daily as needed for anxiety 30 tablet 1    fluticasone-salmeterol (Wixela Inhub) 100-50 mcg/dose inhaler Inhale 1 puff 2 (two) times a day Rinse mouth after use  1 Inhaler 5    SUMAtriptan (IMITREX) 25 mg tablet Take 1 tablet (25 mg total) by mouth as needed for migraine 30 tablet 1    traMADol (ULTRAM) 50 mg tablet Take 1 tablet (50 mg total) by mouth every 6 (six) hours as needed (as needed) 60 tablet 1    venlafaxine (EFFEXOR-XR) 150 mg 24 hr capsule Take 2 capsules (300 mg total) by mouth daily 90 capsule 1    zolpidem (Ambien) 10 mg tablet Take 1 tablet (10 mg total) by mouth daily at bedtime as needed for sleep 30 tablet 3    diclofenac sodium (VOLTAREN) 1 % APPLY 2 GRAMS TO THE AFFECTED AREA(S) BY TOPICAL ROUTE 4 TIMES PER DAY      ergocalciferol (VITAMIN D2) 50,000 units Take 1 capsule (50,000 Units total) by mouth once a week (Patient not taking: Reported on 3/2/2021) 12 capsule 0    hydrochlorothiazide (HYDRODIURIL) 12 5 mg tablet Take 1 tablet (12 5 mg total) by mouth daily 90 tablet 1    pantoprazole (PROTONIX) 40 mg tablet Take 1 tablet (40 mg total) by mouth daily 90 tablet 1     No current facility-administered medications for this visit  Allergies   Allergen Reactions    Lidocaine Anaphylaxis     Patch       Review of Systems   Constitutional: Positive for diaphoresis (chronic) and fatigue  Negative for chills and fever  HENT: Negative for congestion and sore throat  Eyes: Negative for visual disturbance  Respiratory: Positive for shortness of breath (per HPI)  Negative for cough  Cardiovascular: Negative for chest pain, palpitations and leg swelling     Gastrointestinal: Negative for abdominal pain, anorexia, change in bowel habit, nausea and vomiting  Genitourinary: Negative for difficulty urinating  Musculoskeletal: Negative for arthralgias, myalgias and neck pain  Skin: Negative for rash  Neurological: Positive for numbness (down arms, chronic) and headaches  Negative for dizziness and vertigo  Psychiatric/Behavioral: The patient is not nervous/anxious  Video Exam    There were no vitals filed for this visit  Physical Exam  Constitutional:       General: He is not in acute distress  Appearance: He is well-developed  He is not ill-appearing  HENT:      Head: Normocephalic and atraumatic  Mouth/Throat:      Comments: Using chewing tobacco during virtual visit   Eyes:      General: No scleral icterus  Neck:      Musculoskeletal: Normal range of motion  Pulmonary:      Effort: Pulmonary effort is normal    Abdominal:      Palpations: Abdomen is soft  Skin:     Findings: No erythema  Neurological:      Mental Status: He is alert and oriented to person, place, and time  Psychiatric:         Behavior: Behavior normal           I spent 22 minutes directly with the patient during this visit      [facility] acknowledges that he has consented to an online visit or consultation  He understands that the online visit is based solely on information provided by him, and that, in the absence of a face-to-face physical evaluation by the physician, the diagnosis he receives is both limited and provisional in terms of accuracy and completeness  This is not intended to replace a full medical face-to-face evaluation by the physician  Theron Beyer understands and accepts these terms

## 2021-03-02 NOTE — ASSESSMENT & PLAN NOTE
Will check COVID test, quarantine measures reviewed  Will check CBC, CMP, TSH also  F/u based on symptoms/ results

## 2021-03-02 NOTE — LETTER
March 2, 2021     Patient: Eugenia Zepeda   YOB: 1975   Date of Visit: 3/2/2021       To Whom it May Concern:    Michael Brennan is under my professional care  He was seen in my office on 3/2/2021  He may not return to work until the results of his COVID test are available, at which point a further determination will be made  If you have any questions or concerns, please don't hesitate to call           Sincerely,          ANDREW Key        CC: No Recipients

## 2021-03-03 LAB — SARS-COV-2 RNA RESP QL NAA+PROBE: NEGATIVE

## 2021-03-04 ENCOUNTER — APPOINTMENT (OUTPATIENT)
Dept: LAB | Facility: IMAGING CENTER | Age: 46
End: 2021-03-04
Payer: COMMERCIAL

## 2021-03-04 DIAGNOSIS — R53.83 FATIGUE, UNSPECIFIED TYPE: ICD-10-CM

## 2021-03-04 DIAGNOSIS — E55.9 VITAMIN D DEFICIENCY: ICD-10-CM

## 2021-03-04 LAB
25(OH)D3 SERPL-MCNC: 14.4 NG/ML (ref 30–100)
ALBUMIN SERPL BCP-MCNC: 4.7 G/DL (ref 3.5–5)
ALP SERPL-CCNC: 95 U/L (ref 46–116)
ALT SERPL W P-5'-P-CCNC: 104 U/L (ref 12–78)
ANION GAP SERPL CALCULATED.3IONS-SCNC: 5 MMOL/L (ref 4–13)
AST SERPL W P-5'-P-CCNC: 52 U/L (ref 5–45)
BASOPHILS # BLD AUTO: 0.05 THOUSANDS/ΜL (ref 0–0.1)
BASOPHILS NFR BLD AUTO: 1 % (ref 0–1)
BILIRUB SERPL-MCNC: 0.65 MG/DL (ref 0.2–1)
BUN SERPL-MCNC: 14 MG/DL (ref 5–25)
CALCIUM SERPL-MCNC: 9.7 MG/DL (ref 8.3–10.1)
CHLORIDE SERPL-SCNC: 103 MMOL/L (ref 100–108)
CO2 SERPL-SCNC: 30 MMOL/L (ref 21–32)
CREAT SERPL-MCNC: 1.12 MG/DL (ref 0.6–1.3)
EOSINOPHIL # BLD AUTO: 0.04 THOUSAND/ΜL (ref 0–0.61)
EOSINOPHIL NFR BLD AUTO: 1 % (ref 0–6)
ERYTHROCYTE [DISTWIDTH] IN BLOOD BY AUTOMATED COUNT: 12.6 % (ref 11.6–15.1)
GFR SERPL CREATININE-BSD FRML MDRD: 80 ML/MIN/1.73SQ M
GLUCOSE P FAST SERPL-MCNC: 99 MG/DL (ref 65–99)
HCT VFR BLD AUTO: 48.7 % (ref 36.5–49.3)
HGB BLD-MCNC: 16.4 G/DL (ref 12–17)
IMM GRANULOCYTES # BLD AUTO: 0.02 THOUSAND/UL (ref 0–0.2)
IMM GRANULOCYTES NFR BLD AUTO: 0 % (ref 0–2)
LYMPHOCYTES # BLD AUTO: 2.86 THOUSANDS/ΜL (ref 0.6–4.47)
LYMPHOCYTES NFR BLD AUTO: 43 % (ref 14–44)
MCH RBC QN AUTO: 31.4 PG (ref 26.8–34.3)
MCHC RBC AUTO-ENTMCNC: 33.7 G/DL (ref 31.4–37.4)
MCV RBC AUTO: 93 FL (ref 82–98)
MONOCYTES # BLD AUTO: 0.56 THOUSAND/ΜL (ref 0.17–1.22)
MONOCYTES NFR BLD AUTO: 8 % (ref 4–12)
NEUTROPHILS # BLD AUTO: 3.11 THOUSANDS/ΜL (ref 1.85–7.62)
NEUTS SEG NFR BLD AUTO: 47 % (ref 43–75)
NRBC BLD AUTO-RTO: 0 /100 WBCS
PLATELET # BLD AUTO: 291 THOUSANDS/UL (ref 149–390)
PMV BLD AUTO: 11 FL (ref 8.9–12.7)
POTASSIUM SERPL-SCNC: 4.2 MMOL/L (ref 3.5–5.3)
PROT SERPL-MCNC: 7.9 G/DL (ref 6.4–8.2)
RBC # BLD AUTO: 5.22 MILLION/UL (ref 3.88–5.62)
SODIUM SERPL-SCNC: 138 MMOL/L (ref 136–145)
TSH SERPL DL<=0.05 MIU/L-ACNC: 1.34 UIU/ML (ref 0.36–3.74)
WBC # BLD AUTO: 6.64 THOUSAND/UL (ref 4.31–10.16)

## 2021-03-04 PROCEDURE — 84443 ASSAY THYROID STIM HORMONE: CPT

## 2021-03-04 PROCEDURE — 85025 COMPLETE CBC W/AUTO DIFF WBC: CPT

## 2021-03-04 PROCEDURE — 36415 COLL VENOUS BLD VENIPUNCTURE: CPT

## 2021-03-04 PROCEDURE — 80053 COMPREHEN METABOLIC PANEL: CPT

## 2021-03-04 PROCEDURE — 82306 VITAMIN D 25 HYDROXY: CPT

## 2021-04-08 ENCOUNTER — OFFICE VISIT (OUTPATIENT)
Dept: INTERNAL MEDICINE CLINIC | Age: 46
End: 2021-04-08
Payer: COMMERCIAL

## 2021-04-08 VITALS
TEMPERATURE: 98.3 F | OXYGEN SATURATION: 98 % | HEIGHT: 68 IN | SYSTOLIC BLOOD PRESSURE: 132 MMHG | HEART RATE: 82 BPM | DIASTOLIC BLOOD PRESSURE: 80 MMHG | WEIGHT: 227.1 LBS | BODY MASS INDEX: 34.42 KG/M2

## 2021-04-08 DIAGNOSIS — G47.33 OSA (OBSTRUCTIVE SLEEP APNEA): ICD-10-CM

## 2021-04-08 DIAGNOSIS — F41.9 ANXIETY: ICD-10-CM

## 2021-04-08 DIAGNOSIS — I10 ESSENTIAL HYPERTENSION: ICD-10-CM

## 2021-04-08 DIAGNOSIS — K76.0 HEPATIC STEATOSIS: Primary | ICD-10-CM

## 2021-04-08 DIAGNOSIS — R09.82 POST-NASAL DRIP: ICD-10-CM

## 2021-04-08 DIAGNOSIS — G43.109 MIGRAINE WITH AURA AND WITHOUT STATUS MIGRAINOSUS, NOT INTRACTABLE: ICD-10-CM

## 2021-04-08 DIAGNOSIS — F41.1 GENERALIZED ANXIETY DISORDER: ICD-10-CM

## 2021-04-08 DIAGNOSIS — K21.9 GASTROESOPHAGEAL REFLUX DISEASE WITHOUT ESOPHAGITIS: ICD-10-CM

## 2021-04-08 DIAGNOSIS — E55.9 VITAMIN D DEFICIENCY: ICD-10-CM

## 2021-04-08 DIAGNOSIS — F51.01 PRIMARY INSOMNIA: ICD-10-CM

## 2021-04-08 PROBLEM — I51.89 GRADE I DIASTOLIC DYSFUNCTION: Status: RESOLVED | Noted: 2020-10-15 | Resolved: 2021-04-08

## 2021-04-08 PROBLEM — R53.83 FATIGUE: Status: RESOLVED | Noted: 2021-03-02 | Resolved: 2021-04-08

## 2021-04-08 PROBLEM — B34.9 VIRAL ILLNESS: Status: RESOLVED | Noted: 2021-03-02 | Resolved: 2021-04-08

## 2021-04-08 PROCEDURE — 3075F SYST BP GE 130 - 139MM HG: CPT | Performed by: FAMILY MEDICINE

## 2021-04-08 PROCEDURE — 3079F DIAST BP 80-89 MM HG: CPT | Performed by: FAMILY MEDICINE

## 2021-04-08 PROCEDURE — 1036F TOBACCO NON-USER: CPT | Performed by: FAMILY MEDICINE

## 2021-04-08 PROCEDURE — 99214 OFFICE O/P EST MOD 30 MIN: CPT | Performed by: FAMILY MEDICINE

## 2021-04-08 PROCEDURE — 3008F BODY MASS INDEX DOCD: CPT | Performed by: FAMILY MEDICINE

## 2021-04-08 RX ORDER — FLUTICASONE PROPIONATE 50 MCG
1 SPRAY, SUSPENSION (ML) NASAL DAILY
Qty: 3 BOTTLE | Refills: 3 | Status: SHIPPED | OUTPATIENT
Start: 2021-04-08 | End: 2022-06-13 | Stop reason: SDUPTHER

## 2021-04-08 RX ORDER — TRAMADOL HYDROCHLORIDE 50 MG/1
50 TABLET ORAL EVERY 6 HOURS PRN
Qty: 60 TABLET | Refills: 1 | Status: SHIPPED | OUTPATIENT
Start: 2021-04-08 | End: 2021-12-07 | Stop reason: SDUPTHER

## 2021-04-08 RX ORDER — ERGOCALCIFEROL 1.25 MG/1
50000 CAPSULE ORAL WEEKLY
Qty: 12 CAPSULE | Refills: 1 | Status: SHIPPED | OUTPATIENT
Start: 2021-04-08

## 2021-04-08 RX ORDER — ZOLPIDEM TARTRATE 10 MG/1
10 TABLET ORAL
Qty: 30 TABLET | Refills: 3 | Status: SHIPPED | OUTPATIENT
Start: 2021-04-08 | End: 2022-03-15 | Stop reason: SDUPTHER

## 2021-04-08 RX ORDER — ALPRAZOLAM 0.5 MG/1
0.5 TABLET ORAL DAILY PRN
Qty: 30 TABLET | Refills: 1 | Status: SHIPPED | OUTPATIENT
Start: 2021-04-08 | End: 2021-09-09 | Stop reason: SDUPTHER

## 2021-04-08 NOTE — PROGRESS NOTES
Assessment/Plan:    1  Hepatic steatosis    2  Migraine with aura and without status migrainosus, not intractable  -     traMADol (ULTRAM) 50 mg tablet; Take 1 tablet (50 mg total) by mouth every 6 (six) hours as needed (as needed)    3  Vitamin D deficiency  -     ergocalciferol (VITAMIN D2) 50,000 units; Take 1 capsule (50,000 Units total) by mouth once a week  -     Vitamin D 25 hydroxy; Future    4  Anxiety  -     ALPRAZolam (XANAX) 0 5 mg tablet; Take 1 tablet (0 5 mg total) by mouth daily as needed for anxiety    5  Primary insomnia  -     zolpidem (Ambien) 10 mg tablet; Take 1 tablet (10 mg total) by mouth daily at bedtime as needed for sleep    6  BHARGAV (obstructive sleep apnea)    7  Generalized anxiety disorder    8  Gastroesophageal reflux disease without esophagitis    9  Essential hypertension  -     Comprehensive metabolic panel; Future    10  Post-nasal drip  -     fluticasone (FLONASE) 50 mcg/act nasal spray; 1 spray into each nostril daily      BMI Counseling: Body mass index is 34 53 kg/m²  The BMI is above normal  Nutrition recommendations include moderation in carbohydrate intake  Exercise recommendations include exercising 3-5 times per week  No pharmacotherapy was ordered  not using CPAP, blood pressure well controlled with hydrochlorothiazide  [place]ase vitamin[place]D to [address]24 International Units twice a week for 4 weeks and then once a week after that until completed  Okay to take 1000 International Units over-the-counter daily with his prescription dose  Repeat lab work in 6 months  Advised to not skip meals and increased protein levels  There are no Patient Instructions on file for this visit  Return in about 4 weeks (around 5/6/2021) for Recheck and 6 months, biopsy for face  Subjective:      Patient ID: Willis Bueno is a 39 y o  male      Chief Complaint   Patient presents with    Follow-up     review labs 3/4/21       HPI   Vitamin-D deficiency, levels have been low persistently however currently it is 14 and he just restarted taking 1000 International Units of vitamin-D daily  Essential hypertension, on hydrochlorothiazide  Went to the hospital and was prescribed another medication that he did not take  He has significant cardiac testing at that time due to elevated blood pressure and states that his blood pressures at home have been good  He feels it is more of an anxiety and agitation issue instead of blood pressure since he has some marital issues      Generalized Anxiety Disorder (Brief): The patient is being seen for a routine clinic follow-up of anxiety disorder  Symptoms:  no palpitations,-no chest discomfort,-no trouble breathing,-no trembling,-no paresthesias,-no lightheadedness,-no nausea,-no abdominal discomfort,-no excessive sweating,-no hot flashes,-no racing thoughts,-no excessive worrying,-no fear of dying,-no fear of losing control,-no flashbacks,-no repetitive behaviors-and-no sleep disturbance  The patient is currently asymptomatic  Associated symptoms:  no poor concentration,-no memory impairment,-no avoidance of physical exertion,-no irritability,-no agitation,-no hostility-and-no depression symptoms  Suicidal risk:  no suicidal thoughts  Homicidal risk:  no homicidal thoughts  Current treatment includes selective serotonin-norepinephrine reuptake inhibitors and benzodiazepines  There are no medication side effects  (taking xanax a few times per months, uses ambien at night 2-3 times per week   feels well  ) 9/11/18: update: feels that effexor is working well  Tricia Hearn not really needed his Xanax jan 2019: Mehran Flores been stressful in the marriage at home and due to the stress he decided to have an extramarital affair  Neeru Good is now reconciling with his wife in things are better  Neeru Good has had more controlled anxiety symptoms and has been out of his Xanax and Ambien but continues to take his Effexor as directed   Prior to his increase in anxiety he was using Xanax very infrequently  July 2019:Has been having some increased marital stress  and going to counseling   Usually his anxiety is very well controlled except when he his wife drinks too much  Gianluca Alex is in denial and not willing to admit her alcohol intake which upsets the whole family life and harm in but he states children are safe and she has a good mom and they have great family and social support  September 2019  Doing well and still going to counseling  Trent Sloan is not a problem   Effexor was approved and taking medications as described    dec 2019: has been using xanax a little more often at night to help with sleep when he doesn't use the [place]  Ihsan Ear stress levels are good and his marital life is back to normal   [address], things have been relatively stable on his diet is well controlled  Except for the corona virus restrictions there is no added stress  He has been working during the pandemic  April 2021, stable on present dosages  No HI or SI no breakthrough symptoms however does have frequent arguments with his wife  He is looking in to counseling     Shortness of breath :Over the past several weeks he has been having some increased shortness of breath  He thinks he cannot get enough air in and gets short winded with things that he would not before  He has not had his inhaler in quite some time he can not remember the last time he used his rescue inhaler  He is asking for refill  He has a history of asthma but has never had a pulmonary function study done  He denies any wheezing and no shortness of breath at rest  April 2021    Had an issue with dizziness and presyncope and went to [facility]   Had an echocardiogram done there which was normal   There was no diastolic dysfunction which was seen several years ago        Insomnia, uses Ambien occasionally throughout the week   Has been out for several weeks  Does not take it consistently July 2019, relatively well controlled  insomnia September 2019, doing well with no issues   December 2019, has not been sleeping well recently in thinks it was due to the weather and his recent sore throat which required him to go to urgent care   Has been using ambien more frequently but normally does not use it every night  April 2021, uses Ambien regularly     Headaches:  Relatively stable but has needed to use Imitrex with the weather changes and the brain  [facility] is not sure if he has any allergies and has never been checked  April 2021, stable     Sleep apnea, was sent to 2016 but results are currently not available   Has a sleep apnea machine that walked [facility] but he does not use it  [facility] states that when he woke in the middle of it and when it was forcing air and he had a panic attack   Since that time he is not using  [facility] has seen Neurology for his headaches and it was felt that his insomnia and headaches may have been due to sleep apnea but when did not uses machine long enough to see any changes  April 2021, not using CPAP    Not sure if he has in snoring or gasping for air         The following portions of the patient's history were reviewed and updated as appropriate: allergies, current medications, past family history, past medical history, past social history, past surgical history and problem list     Review of Systems      Constitutional:  Denies fever or chills , + weight gain  Eyes:  Denies double or blurry vision  HENT:  Denies nasal congestion or sore throat   Respiratory:  Denies cough or shortness of breath or wheezing  Cardiovascular:  Denies palpitations or chest pain  GI:  Denies abdominal pain, nausea, or vomiting, no loose stools  Integument:  Denies rash , no open areas  Neurologic:  Denies headache or focal weakness, no dizziness      Current Outpatient Medications   Medication Sig Dispense Refill    albuterol (PROVENTIL HFA,VENTOLIN HFA) 90 mcg/act inhaler Inhale 2 puffs every 6 (six) hours as needed for wheezing or shortness of breath 1 Inhaler 0    ALPRAZolam (XANAX) 0 5 mg tablet Take 1 tablet (0 5 mg total) by mouth daily as needed for anxiety 30 tablet 1    diclofenac sodium (VOLTAREN) 1 % APPLY 2 GRAMS TO THE AFFECTED AREA(S) BY TOPICAL ROUTE 4 TIMES PER DAY      fluticasone-salmeterol (Wixela Inhub) 100-50 mcg/dose inhaler Inhale 1 puff 2 (two) times a day Rinse mouth after use  1 Inhaler 5    hydrochlorothiazide (HYDRODIURIL) 12 5 mg tablet Take 1 tablet (12 5 mg total) by mouth daily 90 tablet 1    pantoprazole (PROTONIX) 40 mg tablet Take 1 tablet (40 mg total) by mouth daily 90 tablet 1    SUMAtriptan (IMITREX) 25 mg tablet Take 1 tablet (25 mg total) by mouth as needed for migraine 30 tablet 1    traMADol (ULTRAM) 50 mg tablet Take 1 tablet (50 mg total) by mouth every 6 (six) hours as needed (as needed) 60 tablet 1    venlafaxine (EFFEXOR-XR) 150 mg 24 hr capsule Take 2 capsules (300 mg total) by mouth daily 90 capsule 1    zolpidem (Ambien) 10 mg tablet Take 1 tablet (10 mg total) by mouth daily at bedtime as needed for sleep 30 tablet 3    ergocalciferol (VITAMIN D2) 50,000 units Take 1 capsule (50,000 Units total) by mouth once a week 12 capsule 1    fluticasone (FLONASE) 50 mcg/act nasal spray 1 spray into each nostril daily 3 Bottle 3     No current facility-administered medications for this visit          Objective:    /80 (BP Location: Left arm, Patient Position: Sitting, Cuff Size: Large)   Pulse 82   Temp 98 3 °F (36 8 °C) (Temporal)   Ht 5' 8" (1 727 m)   Wt 103 kg (227 lb 1 6 oz)   SpO2 98%   BMI 34 53 kg/m²        Physical Exam       Constitutional:  Well developed, well nourished, no acute distress, non-toxic appearance   Eyes:  PERRL, conjunctiva normal , non icteric sclera  HENT:  Atraumatic, oropharynx moist  Neck-  supple   Respiratory:  CTA b/l, normal breath sounds, no rales, no wheezing   Cardiovascular:  RRR, no murmurs, no LE edema b/l  GI:  Soft, nondistended, normal bowel sounds x 4, nontender, no organomegaly, no mass, no rebound, no guarding   Neurologic:  no focal deficits noted   Psychiatric:  Speech and behavior appropriate , AAO x 3        Nohelia Barrera, DO

## 2021-04-26 NOTE — PROGRESS NOTES
Assessment   1  Acute bronchitis (466 0) (J20 9)   2  Acute upper respiratory infection (465 9) (J06 9)    Plan   Acute bronchitis    · PredniSONE 10 MG Oral Tablet; 4 tabs x 2 days then 3 tabs x 2 days then 2 tabs x 2    days then 1 tab x 2 days  Acute bronchitis, Hemoptysis    · From  Promethazine-Codeine 6 25-10 MG/5ML Oral Syrup TAKE 5 ML BY MOUTH AT    BEDTIME AS NEEDED To Promethazine-Codeine 6 25-10 MG/5ML Oral Syrup TAKE 5 ML EVERY 4 TO    6 HOURS AS NEEDED  Acute upper respiratory infection, Viral URI    · Benzonatate 200 MG Oral Capsule    Discussion/Summary   Discussion Summary:    Pt counseled to take levaquin with food to be taken in am with food - instructed proper way to take this  next week if not improving offered to keep pt out of work for a few days to help him recouperate but he states he is unable to do so also instructed to report any hemoptysis or SOB  Medication SE Review and Pt Understands Tx: Possible side effects of new medications were reviewed with the patient/guardian today  The treatment plan was reviewed with the patient/guardian  The patient/guardian understands and agrees with the treatment plan      Chief Complaint   Chief Complaint Free Text Note Form: pt  presents for follow up for acute bronchitis  Review CHEST XRAY  pt  was seen in office 12/8/17 was prescribed promethazine with codeine syrup, not helping  History of Present Illness   HPI: f/u from [address] [place] [address] Rd 12/8/17  Pt was initially seen 11/30/17 for URI and tx with zpak which he reports did not improve his symptoms, he has continued with cough with coughing fits and wheezing noted, worse at night  Pt was given promethazine/codeine which he has taken at bedtime which helps sleep somewhat but still wakes up coughing  Hx of tobacco use but denies current use  He had also noted hemoptysis at last visit (small blood tinged sputum) which he reports is gone now   CXR done 12/13/17 negative for pneumonia   denies fevers or chills, has continued to work bc he isn't able to take off and he works outside in the cold air which seems to exacerbate his symptoms      Review of Systems   Complete-Male:      Constitutional: feeling tired, but-- no fever,-- not feeling poorly-- and-- no chills  Eyes: no eye pain-- and-- no purulent discharge from the eyes  ENT: no earache-- and-- no nasal discharge  Cardiovascular: no chest pain,-- no palpitations-- and-- no extremity edema  Respiratory: cough-- and-- wheezing, but-- as noted in HPI  Gastrointestinal: no abdominal pain-- and-- no constipation  Genitourinary: no dysuria  Musculoskeletal: no arthralgias-- and-- no myalgias  Integumentary: no rashes-- and-- no itching  Neurological: no headache-- and-- no dizziness  Hematologic/Lymphatic: no swollen glands  ROS Reviewed:    ROS reviewed  Active Problems   1  Acid reflux (530 81) (K21 9)   2  Acute bronchitis (466 0) (J20 9)   3  Acute upper respiratory infection (465 9) (J06 9)   4  Arrhythmia (427 9) (I49 9)   5  Cervicalgia (723 1) (M54 2)   6  Chronic insomnia (780 52) (F51 04)   7  Classic migraine (346 00) (G43 109)   8  Deep vein thrombosis (DVT) of left lower extremity (453 40) (I82 402)   9  Essential hypertension (401 9) (I10)   10  Extrinsic asthma (493 00) (J45 909)   11  Fatty liver (571 8) (K76 0)   12  Generalized anxiety disorder (300 02) (F41 1)   13  Hemoptysis (786 30) (R04 2)   14  Liver enlargement (789 1) (R16 0)   15  Lupus anticoagulant positive (795 79) (R76 0)   16  BHARGAV (obstructive sleep apnea) (327 23) (G47 33)   17  Other acute pulmonary embolism (415 19) (I26 99)   18  Pulmonary embolism (415 19) (I26 99)   19  Screening for lipoid disorders (V77 91) (Z13 220)   20  Snoring (786 09) (R06 83)   21  Viral URI (465 9) (J06 9,B97 89)   22  Vitamin D deficiency (268 9) (E55 9)    Past Medical History   1  History of Abdominal pain (789 00) (R10 9)   2   History of Accelerated essential hypertension (401 0) (I10)   3  History of Acute conjunctivitis of right eye, unspecified acute conjunctivitis type (372 00) (H10 31)   4  History of Acute intractable headache, unspecified headache type (784 0) (R51)   5  Denied: History of Carrier Of STD   6  History of Colitis (558 9) (K52 9)   7  History of Cough (786 2) (R05)   8  History of Elevated ALT measurement (790 4) (R74 0)   9  History of Encounter for male sterilization procedure (V25 2) (Z30 2)   10  History of Encounter for sterilization (V25 2) (Z30 2)   11  History of headache (V13 89) (Z87 898)   12  History of sinusitis (V12 69) (Z87 09)   13  History of syncope (V15 89) (Z87 898)   14  History of syncope (V15 89) (Z87 898)   15  History of Holter monitor, abnormal (794 31) (R94 31)   16  History of Knee pain (719 46) (M25 569)   17  History of Meniscus tear (836 2) (S83 209A)   18  Denied: History of Mental Status Change   19  History of Nonvenomous Insect Bite (O482 1)  Active Problems And Past Medical History Reviewed: The active problems and past medical history were reviewed and updated today  Surgical History   1  History of Hernia Repair   2  History of Knee Arthroscopy (Therapeutic)   3  History of Shoulder Surgery Left   4  History of Surgery Vas Deferens Vasectomy  Surgical History Reviewed: The surgical history was reviewed and updated today  Family History   Mother    1  Family history of Type 2 Diabetes Mellitus  Father    2  Family history of hypertension (V17 49) (Z82 49)   3  Family history of Type 2 Diabetes Mellitus  Sister    4  Family history of Cancer  Brother    5  Family history of Cancer   6  Family history of hypertension (V17 49) (Z82 49)   7  Family history of Fatty Liver  Grandparent    8  Family history of Cancer  Paternal Grandmother    5  Family history of hypertension (V17 49) (Z82 49)   10  Family history of Type 2 Diabetes Mellitus  Maternal Grandfather    11   Family history of Cancer   12  Family history of Family Health Status 3  Children Living   15  Family history of Travel  Paternal Grandfather    15  Family history of hypertension (V17 49) (Z82 49)  Maternal Aunt    13  Family history of Cancer  Family History Reviewed: The family history was reviewed and updated today  Social History    · Alcohol Use (History)   · Caffeine Use   · Chewing Tobacco Fine Cut   · Former smoker (P67 47) (Y24 471)   · Marital History - Currently    · Never A Smoker   · Never Used Drugs   · Occupation:  Social History Reviewed: The social history was reviewed and updated today  Current Meds    1  ALPRAZolam 0 5 MG Oral Tablet; TAKE 1 TABLET  PRN; Last Rx:83Xnm9315 Ordered   2  Aspirin 81 MG TABS; TAKE 1 TABLET DAILY; Therapy: (Recorded:18Jon9458) to Recorded   3  Benzonatate 200 MG Oral Capsule; TAKE 1 CAPSULE 3 TIMES DAILY AS NEEDED; Therapy: 40GIT8477 to (Evaluate:10Dec2017)  Requested for: 90NZF9476; Last Rx:30Nov2017     Ordered   4  Fluticasone Propionate 50 MCG/ACT Nasal Suspension; USE 1 TO 2 SPRAYS IN EACH NOSTRIL     ONCE DAILY; Therapy: 60EKB4812 to (Last Rx:30Nov2017)  Requested for: 43HRK9167 Ordered   5  LevoFLOXacin 500 MG Oral Tablet; TAKE 1 TABLET DAILY AS DIRECTED; Therapy: 75OVU4518 to (Evaluate:20Dec2017)  Requested for: 69Xcf1596; Last Rx:13Dec2017     Ordered   6  Promethazine-Codeine 6 25-10 MG/5ML Oral Syrup; TAKE 5 ML BY MOUTH AT BEDTIME AS NEEDED; Therapy: 01RWS5429 to (Evaluate:01Jan2018); Last Rx:68Yaj7853 Ordered   7  SUMAtriptan Succinate 25 MG Oral Tablet; TAKE 1 TABLET FOR MIGRAINE RELIEF  MAY REPEAT     EVERY 2 HOURS  MAX 200MG/DAY; Therapy: 30GVE8481 to (Last Rx:17Oct2016) Ordered   8  TraMADol HCl - 50 MG Oral Tablet; TAKE 1 TABLET Every 6 hours PRN pain; Therapy: 18VKP1543 to [phone]); Last Rx:65Mqx8985 Ordered   9   Venlafaxine HCl  MG Oral Capsule Extended Release 24 Hour; TAKE 2 CAPSULES DAILY Requested for: 79Mqv7671; Last Rx:49Mpb0862 Ordered   10  Zolpidem Tartrate 10 MG Oral Tablet; TAKE 1 TABLET AT BEDTIME; Therapy: 58KUB7308 to (Evaluate:58Anr4142); Last Rx:11Nrs4120 Ordered  Medication List Reviewed: The medication list was reviewed and updated today  Allergies   1  Lidoderm PTCH  2  No Known Environmental Allergies   3  No Known Food Allergies  Denied    4  Lidocream CREA    Vitals   Vital Signs    Recorded: 67LJW1075 01:50PM   Temperature 97 9 F, Tympanic   Heart Rate 98   Systolic 975, LUE, Sitting   Diastolic 86, LUE, Sitting   Height 5 ft 8 in   Weight 213 lb    BMI Calculated 32 39   BSA Calculated 2 1   O2 Saturation 98, RA     Physical Exam        Constitutional      General appearance: No acute distress, well appearing and well nourished  Eyes      Conjunctiva and lids: No swelling, erythema, or discharge  Ears, Nose, Mouth, and Throat      External inspection of ears and nose: Normal        Otoscopic examination: Tympanic membrance translucent with normal light reflex  Canals patent without erythema  Nasal mucosa, septum, and turbinates: Normal without edema or erythema  Oropharynx: Normal with no erythema, edema, exudate or lesions  Pulmonary      Auscultation of lungs: Abnormal  -- exp wheeze b/l (pronlonged exp) no rhonchi  Cardiovascular      Auscultation of heart: Normal rate and rhythm, normal S1 and S2, without murmurs  Lymphatic      Palpation of lymph nodes in neck: No lymphadenopathy  Musculoskeletal      Gait and station: Normal        Skin      Skin and subcutaneous tissue: Normal without rashes or lesions         Psychiatric      Orientation to person, place and time: Normal        Mood and affect: Normal           Future Appointments      Date/Time Provider Specialty Site   01/02/2018 05:00 PM Ellyn James DO Internal Medicine [facility]     Signatures    Electronically signed by : Annie Morelos PAC; Dec 14 2017  2:14PM EST                       (Author)     Electronically signed by :  Yevgeniy Engel MD; Dec 14 2017  2:36PM EST                       (Author) normal

## 2021-04-28 ENCOUNTER — IMMUNIZATIONS (OUTPATIENT)
Dept: FAMILY MEDICINE CLINIC | Facility: HOSPITAL | Age: 46
End: 2021-04-28

## 2021-04-28 DIAGNOSIS — Z23 ENCOUNTER FOR IMMUNIZATION: Primary | ICD-10-CM

## 2021-04-28 PROCEDURE — 0001A SARS-COV-2 / COVID-19 MRNA VACCINE (PFIZER-BIONTECH) 30 MCG: CPT

## 2021-04-28 PROCEDURE — 91300 SARS-COV-2 / COVID-19 MRNA VACCINE (PFIZER-BIONTECH) 30 MCG: CPT

## 2021-05-19 ENCOUNTER — IMMUNIZATIONS (OUTPATIENT)
Dept: FAMILY MEDICINE CLINIC | Facility: HOSPITAL | Age: 46
End: 2021-05-19

## 2021-05-19 DIAGNOSIS — Z23 ENCOUNTER FOR IMMUNIZATION: Primary | ICD-10-CM

## 2021-05-19 PROCEDURE — 0002A SARS-COV-2 / COVID-19 MRNA VACCINE (PFIZER-BIONTECH) 30 MCG: CPT

## 2021-05-19 PROCEDURE — 91300 SARS-COV-2 / COVID-19 MRNA VACCINE (PFIZER-BIONTECH) 30 MCG: CPT

## 2021-06-17 ENCOUNTER — OCCMED (OUTPATIENT)
Dept: URGENT CARE | Age: 46
End: 2021-06-17
Payer: OTHER MISCELLANEOUS

## 2021-06-17 ENCOUNTER — APPOINTMENT (OUTPATIENT)
Dept: RADIOLOGY | Age: 46
End: 2021-06-17
Payer: OTHER MISCELLANEOUS

## 2021-06-17 DIAGNOSIS — M79.5 FOREIGN BODY (FB) IN SOFT TISSUE: ICD-10-CM

## 2021-06-17 DIAGNOSIS — F41.9 ANXIETY: ICD-10-CM

## 2021-06-17 DIAGNOSIS — M79.5 FOREIGN BODY (FB) IN SOFT TISSUE: Primary | ICD-10-CM

## 2021-06-17 PROCEDURE — 73140 X-RAY EXAM OF FINGER(S): CPT

## 2021-06-17 PROCEDURE — 99283 EMERGENCY DEPT VISIT LOW MDM: CPT | Performed by: PHYSICIAN ASSISTANT

## 2021-06-17 PROCEDURE — G0382 LEV 3 HOSP TYPE B ED VISIT: HCPCS | Performed by: PHYSICIAN ASSISTANT

## 2021-06-17 RX ORDER — VENLAFAXINE HYDROCHLORIDE 150 MG/1
300 CAPSULE, EXTENDED RELEASE ORAL DAILY
Qty: 180 CAPSULE | Refills: 1 | Status: SHIPPED | OUTPATIENT
Start: 2021-06-17 | End: 2021-09-09 | Stop reason: SDUPTHER

## 2021-06-17 NOTE — TELEPHONE ENCOUNTER
Last ov 4/8/21  Next ov 8/5/21    Patient's last RX was for 90 capsules  He ran out early because he take 2 capsules daily

## 2021-06-17 NOTE — TELEPHONE ENCOUNTER
Patient called and stated that he needs refills of Effexor-XR 150mg tablet) please send to [facility] in [place]

## 2021-06-22 ENCOUNTER — APPOINTMENT (OUTPATIENT)
Dept: URGENT CARE | Age: 46
End: 2021-06-22
Payer: OTHER MISCELLANEOUS

## 2021-06-22 PROCEDURE — 99213 OFFICE O/P EST LOW 20 MIN: CPT | Performed by: PHYSICIAN ASSISTANT

## 2021-07-06 ENCOUNTER — APPOINTMENT (EMERGENCY)
Dept: RADIOLOGY | Facility: HOSPITAL | Age: 46
End: 2021-07-06
Payer: COMMERCIAL

## 2021-07-06 ENCOUNTER — APPOINTMENT (EMERGENCY)
Dept: NON INVASIVE DIAGNOSTICS | Facility: HOSPITAL | Age: 46
End: 2021-07-06
Payer: COMMERCIAL

## 2021-07-06 ENCOUNTER — OFFICE VISIT (OUTPATIENT)
Dept: URGENT CARE | Age: 46
End: 2021-07-06
Payer: COMMERCIAL

## 2021-07-06 ENCOUNTER — HOSPITAL ENCOUNTER (EMERGENCY)
Facility: HOSPITAL | Age: 46
Discharge: HOME | End: 2021-07-06
Attending: EMERGENCY MEDICINE | Admitting: EMERGENCY MEDICINE
Payer: COMMERCIAL

## 2021-07-06 VITALS
BODY MASS INDEX: 34.71 KG/M2 | SYSTOLIC BLOOD PRESSURE: 146 MMHG | WEIGHT: 229 LBS | HEIGHT: 68 IN | TEMPERATURE: 97.5 F | HEART RATE: 91 BPM | DIASTOLIC BLOOD PRESSURE: 102 MMHG | OXYGEN SATURATION: 99 % | RESPIRATION RATE: 18 BRPM

## 2021-07-06 VITALS
SYSTOLIC BLOOD PRESSURE: 160 MMHG | HEART RATE: 87 BPM | TEMPERATURE: 98.4 F | BODY MASS INDEX: 34.53 KG/M2 | RESPIRATION RATE: 18 BRPM | WEIGHT: 227.07 LBS | DIASTOLIC BLOOD PRESSURE: 99 MMHG | OXYGEN SATURATION: 96 %

## 2021-07-06 DIAGNOSIS — S83.90XA: Primary | ICD-10-CM

## 2021-07-06 DIAGNOSIS — M79.661 RIGHT CALF PAIN: Primary | ICD-10-CM

## 2021-07-06 DIAGNOSIS — S93.409A ANKLE SPRAIN: ICD-10-CM

## 2021-07-06 DIAGNOSIS — R81 GLYCOSURIA: ICD-10-CM

## 2021-07-06 LAB
ANION GAP SERPL CALCULATED.3IONS-SCNC: 5 MMOL/L (ref 4–13)
APTT PPP: 27 SECONDS (ref 23–37)
BACTERIA UR QL AUTO: ABNORMAL /HPF
BASOPHILS # BLD AUTO: 0.04 THOUSANDS/ΜL (ref 0–0.1)
BASOPHILS NFR BLD AUTO: 1 % (ref 0–1)
BILIRUB UR QL STRIP: NEGATIVE
BUN SERPL-MCNC: 16 MG/DL (ref 5–25)
CALCIUM SERPL-MCNC: 9.1 MG/DL (ref 8.3–10.1)
CHLORIDE SERPL-SCNC: 103 MMOL/L (ref 100–108)
CLARITY UR: CLEAR
CO2 SERPL-SCNC: 34 MMOL/L (ref 21–32)
COLOR UR: YELLOW
CREAT SERPL-MCNC: 1.12 MG/DL (ref 0.6–1.3)
EOSINOPHIL # BLD AUTO: 0.06 THOUSAND/ΜL (ref 0–0.61)
EOSINOPHIL NFR BLD AUTO: 1 % (ref 0–6)
ERYTHROCYTE [DISTWIDTH] IN BLOOD BY AUTOMATED COUNT: 12.3 % (ref 11.6–15.1)
GFR SERPL CREATININE-BSD FRML MDRD: 80 ML/MIN/1.73SQ M
GLUCOSE SERPL-MCNC: 91 MG/DL (ref 65–140)
GLUCOSE UR STRIP-MCNC: ABNORMAL MG/DL
HCT VFR BLD AUTO: 44 % (ref 36.5–49.3)
HGB BLD-MCNC: 15.1 G/DL (ref 12–17)
HGB UR QL STRIP.AUTO: ABNORMAL
IMM GRANULOCYTES # BLD AUTO: 0.02 THOUSAND/UL (ref 0–0.2)
IMM GRANULOCYTES NFR BLD AUTO: 0 % (ref 0–2)
INR PPP: 0.95 (ref 0.84–1.19)
KETONES UR STRIP-MCNC: NEGATIVE MG/DL
LEUKOCYTE ESTERASE UR QL STRIP: NEGATIVE
LYMPHOCYTES # BLD AUTO: 2.82 THOUSANDS/ΜL (ref 0.6–4.47)
LYMPHOCYTES NFR BLD AUTO: 46 % (ref 14–44)
MCH RBC QN AUTO: 31.7 PG (ref 26.8–34.3)
MCHC RBC AUTO-ENTMCNC: 34.3 G/DL (ref 31.4–37.4)
MCV RBC AUTO: 92 FL (ref 82–98)
MONOCYTES # BLD AUTO: 0.56 THOUSAND/ΜL (ref 0.17–1.22)
MONOCYTES NFR BLD AUTO: 9 % (ref 4–12)
NEUTROPHILS # BLD AUTO: 2.66 THOUSANDS/ΜL (ref 1.85–7.62)
NEUTS SEG NFR BLD AUTO: 43 % (ref 43–75)
NITRITE UR QL STRIP: NEGATIVE
NON-SQ EPI CELLS URNS QL MICRO: ABNORMAL /HPF
NRBC BLD AUTO-RTO: 0 /100 WBCS
PH UR STRIP.AUTO: 6 [PH] (ref 4.5–8)
PLATELET # BLD AUTO: 250 THOUSANDS/UL (ref 149–390)
PMV BLD AUTO: 10.1 FL (ref 8.9–12.7)
POTASSIUM SERPL-SCNC: 3.8 MMOL/L (ref 3.5–5.3)
PROT UR STRIP-MCNC: NEGATIVE MG/DL
PROTHROMBIN TIME: 12.5 SECONDS (ref 11.6–14.5)
RBC # BLD AUTO: 4.77 MILLION/UL (ref 3.88–5.62)
RBC #/AREA URNS AUTO: ABNORMAL /HPF
SODIUM SERPL-SCNC: 142 MMOL/L (ref 136–145)
SP GR UR STRIP.AUTO: 1.02 (ref 1–1.03)
UROBILINOGEN UR QL STRIP.AUTO: 0.2 E.U./DL
WBC # BLD AUTO: 6.16 THOUSAND/UL (ref 4.31–10.16)
WBC #/AREA URNS AUTO: ABNORMAL /HPF

## 2021-07-06 PROCEDURE — 80048 BASIC METABOLIC PNL TOTAL CA: CPT | Performed by: PHYSICIAN ASSISTANT

## 2021-07-06 PROCEDURE — 93971 EXTREMITY STUDY: CPT

## 2021-07-06 PROCEDURE — 73590 X-RAY EXAM OF LOWER LEG: CPT

## 2021-07-06 PROCEDURE — 93971 EXTREMITY STUDY: CPT | Performed by: SURGERY

## 2021-07-06 PROCEDURE — 85730 THROMBOPLASTIN TIME PARTIAL: CPT | Performed by: PHYSICIAN ASSISTANT

## 2021-07-06 PROCEDURE — 99285 EMERGENCY DEPT VISIT HI MDM: CPT | Performed by: PHYSICIAN ASSISTANT

## 2021-07-06 PROCEDURE — 85610 PROTHROMBIN TIME: CPT | Performed by: PHYSICIAN ASSISTANT

## 2021-07-06 PROCEDURE — 99213 OFFICE O/P EST LOW 20 MIN: CPT | Performed by: PHYSICIAN ASSISTANT

## 2021-07-06 PROCEDURE — 85025 COMPLETE CBC W/AUTO DIFF WBC: CPT | Performed by: PHYSICIAN ASSISTANT

## 2021-07-06 PROCEDURE — 99284 EMERGENCY DEPT VISIT MOD MDM: CPT

## 2021-07-06 PROCEDURE — 36415 COLL VENOUS BLD VENIPUNCTURE: CPT | Performed by: PHYSICIAN ASSISTANT

## 2021-07-06 PROCEDURE — 81001 URINALYSIS AUTO W/SCOPE: CPT

## 2021-07-06 RX ORDER — IBUPROFEN 600 MG/1
600 TABLET ORAL ONCE
Status: COMPLETED | OUTPATIENT
Start: 2021-07-06 | End: 2021-07-06

## 2021-07-06 RX ADMIN — IBUPROFEN 600 MG: 600 TABLET, FILM COATED ORAL at 13:13

## 2021-07-06 NOTE — ED PROVIDER NOTES
History  Chief Complaint   Patient presents with    Leg Swelling     Pt reports right calf x 1 week  pt reports he was bit [place] swiming [place] the ocean  Pt reports bruising and swelling in area  pt sent here for dvt eval due to hx of dvts      Patient was in [place] at [facility] about a week ago while he was there in the water he states he felt something bite his left toe and he ran out of the water to get away from what ever bit him in when he was running out he suddenly felt [place] horse in his right calf and had some pain  Patient thought he just pulled a muscle  He states that his wife is a physical therapist and she has been working with him however he feels that he is getting worse and not getting better  Patient was concerned because he has a history of having a DVT to the left lower leg several years ago after surgery  Patient states that he is not still on any blood thinners  Patient states that his right calf is swollen and tender and he has some bruising to the area and so he was concerned and came in for evaluation  Patient states he initially went to urgent care and they sent him here for DVT evaluation  Prior to Admission Medications   Prescriptions Last Dose Informant Patient Reported? Taking?    ALPRAZolam (XANAX) 0 5 mg tablet   No No   Sig: Take 1 tablet (0 5 mg total) by mouth daily as needed for anxiety   SUMAtriptan (IMITREX) 25 mg tablet  Self No No   Sig: Take 1 tablet (25 mg total) by mouth as needed for migraine   albuterol (PROVENTIL HFA,VENTOLIN HFA) 90 mcg/act inhaler   No No   Sig: Inhale 2 puffs every 6 (six) hours as needed for wheezing or shortness of breath   diclofenac sodium (VOLTAREN) 1 %  Self Yes No   Sig: APPLY 2 GRAMS TO THE AFFECTED AREA(S) BY TOPICAL ROUTE 4 TIMES PER DAY   ergocalciferol (VITAMIN D2) 50,000 units   No No   Sig: Take 1 capsule (50,000 Units total) by mouth once a week   fluticasone (FLONASE) 50 mcg/act nasal spray   No No   Si spray into each nostril daily   fluticasone-salmeterol (Wixela Inhub) 100-50 mcg/dose inhaler   No No   Sig: Inhale 1 puff 2 (two) times a day Rinse mouth after use    hydrochlorothiazide (HYDRODIURIL) 12 5 mg tablet   No No   Sig: Take 1 tablet (12 5 mg total) by mouth daily   pantoprazole (PROTONIX) 40 mg tablet   No No   Sig: Take 1 tablet (40 mg total) by mouth daily   traMADol (ULTRAM) 50 mg tablet   No No   Sig: Take 1 tablet (50 mg total) by mouth every 6 (six) hours as needed (as needed)   venlafaxine (EFFEXOR-XR) 150 mg 24 hr capsule   No No   Sig: Take 2 capsules (300 mg total) by mouth daily   zolpidem (Ambien) 10 mg tablet   No No   Sig: Take 1 tablet (10 mg total) by mouth daily at bedtime as needed for sleep      Facility-Administered Medications: None       Past Medical History:   Diagnosis Date    Accelerated essential hypertension     Anxiety     Colitis     COVID-19 10/2020    [facility]deep venous thrombosis) [facility] 2015    Elevated ALT measurement     GERD (gastroesophageal reflux disease)     Gynecomastia 7/16/2019    Headache     Holter monitor, abnormal     Left leg DVT ([facility]) 10/8/2016    Migraine     PE [facility]pulmonary thromboembolism) ([facility] [address] ) 2015    Pulmonary embolism ([facility] [address] ) 2/23/2017    Sleep apnea     mild    Status post right breast biopsy 9/4/2019    Syncope     Tobacco dependence due to chewing tobacco 7/2/2019       Past Surgical History:   Procedure Laterality Date    BREAST LUMPECTOMY Right     HERNIA REPAIR Right     groin    KNEE ARTHROSCOPY Left     x2    KNEE SURGERY  10/2015    KS BIOPSY OF BREAST, NEEDLE CORE Right 8/22/2019    Procedure: EXCISIONAL BIOPSY RIGHT BREAST MASS;  Surgeon: Quique Haider MD;  [facility]: [facility];  Service: General    SHOULDER SURGERY Left     3x    VASECTOMY      WISDOM TOOTH EXTRACTION         Family History   Problem Relation Age of Onset    Migraines Mother     Diabetes Mother     Hypertension Father     Diabetes Father  Cancer Sister     Thyroid cancer Sister 28    Cancer Brother     Hypertension Brother     Liver disease Brother         fatty liver    Cancer Maternal Grandfather     Other Maternal Grandfather         history of travel    Hypertension Paternal Grandmother     Diabetes Paternal Grandmother     Hypertension Paternal Grandfather     Cancer Other     Cancer Maternal Aunt      I have reviewed and agree with the history as documented  E-Cigarette/Vaping    E-Cigarette Use Never User      E-Cigarette/Vaping Substances    Nicotine No     THC No     CBD No     Flavoring No     Other No      Social History     Tobacco Use    Smoking status: Never Smoker    Smokeless tobacco: Current User     Types: Chew    Tobacco comment: chews daily   Vaping Use    Vaping Use: Never used   Substance Use Topics    Alcohol use: Yes     Comment: occassional    Drug use: No       Review of Systems   Respiratory: Negative  Cardiovascular: Negative  Gastrointestinal: Negative  Genitourinary: Negative  Musculoskeletal: Negative for back pain  All other systems reviewed and are negative  Physical Exam  Physical Exam  Vitals and nursing note reviewed  Constitutional:       Appearance: He is well-developed  HENT:      Head: Normocephalic and atraumatic  Right Ear: External ear normal       Left Ear: External ear normal    Eyes:      Conjunctiva/sclera: Conjunctivae normal    Cardiovascular:      Rate and Rhythm: Normal rate and regular rhythm  Heart sounds: Normal heart sounds  Pulmonary:      Effort: Pulmonary effort is normal       Breath sounds: Normal breath sounds  Abdominal:      General: Bowel sounds are normal       Palpations: Abdomen is soft  Musculoskeletal:         General: Normal range of motion  Cervical back: Normal range of motion and neck supple  Right ankle: Ecchymosis present        Right Achilles Tendon: Normal         Legs:       Comments: + edema to right calf and tender - will eval for DVT   Lymphadenopathy:      Cervical: No cervical adenopathy  Skin:     General: Skin is warm  Findings: No rash  Neurological:      Mental Status: He is alert and oriented to person, place, and time  Motor: No abnormal muscle tone        Coordination: Coordination normal    Psychiatric:         Behavior: Behavior normal          Vital Signs  ED Triage Vitals [07/06/21 1216]   Temperature Pulse Respirations Blood Pressure SpO2   98 4 °F (36 9 °C) 87 18 160/99 96 %      Temp Source Heart Rate Source Patient Position - Orthostatic VS BP Location FiO2 (%)   Oral Monitor Sitting Right arm --      Pain Score       --           Vitals:    07/06/21 1216   BP: 160/99   Pulse: 87   Patient Position - Orthostatic VS: Sitting         Visual Acuity      ED Medications  Medications   ibuprofen (MOTRIN) tablet 600 mg (600 mg Oral Given 7/6/21 1313)       Diagnostic Studies  Results Reviewed     Procedure Component Value Units Date/Time    Urine Microscopic [[id number]]  (Abnormal) Collected: 07/06/21 1305    Lab Status: Final result Specimen: Urine, Clean Catch Updated: 07/06/21 1346     RBC, UA 0-1 /hpf      WBC, UA 4-10 /hpf      Epithelial Cells Occasional /hpf      Bacteria, UA Occasional /hpf     Basic metabolic panel [[id number]]  (Abnormal) Collected: 07/06/21 1313    Lab Status: Final result Specimen: Blood from Arm, Left Updated: 07/06/21 1335     Sodium 142 mmol/L      Potassium 3 8 mmol/L      Chloride 103 mmol/L      CO2 34 mmol/L      ANION GAP 5 mmol/L      BUN 16 mg/dL      Creatinine 1 12 mg/dL      Glucose 91 mg/dL      Calcium 9 1 mg/dL      eGFR 80 ml/min/1 73sq m     Narrative:      [place] guidelines for Chronic Kidney Disease (CKD):     Stage 1 with normal or high GFR (GFR > 90 mL/min/1 73 square meters)    Stage 2 Mild CKD (GFR = 60-89 mL/min/1 73 square meters)    Stage 3A Moderate CKD (GFR = 45-59 mL/min/1 73 square meters)   Stage 3B Moderate CKD (GFR = 30-44 mL/min/1 73 square meters)    Stage 4 Severe CKD (GFR = 15-29 mL/min/1 73 square meters)    Stage 5 End Stage CKD (GFR <15 mL/min/1 73 square meters)  Note: GFR calculation is accurate only with a steady state creatinine    Protime-INR [[id number]]  (Normal) Collected: 07/06/21 1313    Lab Status: Final result Specimen: Blood from Arm, Left Updated: 07/06/21 1329     Protime 12 5 seconds      INR 0 95    APTT [[id number]]  (Normal) Collected: 07/06/21 1313    Lab Status: Final result Specimen: Blood from Arm, Left Updated: 07/06/21 1329     PTT 27 seconds     CBC and differential [[id number]]  (Abnormal) Collected: 07/06/21 1313    Lab Status: Final result Specimen: Blood from Arm, Left Updated: 07/06/21 1320     WBC 6 16 Thousand/uL      RBC 4 77 Million/uL      Hemoglobin 15 1 g/dL      Hematocrit 44 0 %      MCV 92 fL      MCH 31 7 pg      MCHC 34 3 g/dL      RDW 12 3 %      MPV 10 1 fL      Platelets 028 Thousands/uL      nRBC 0 /100 WBCs      Neutrophils Relative 43 %      Immat GRANS % 0 %      Lymphocytes Relative 46 %      Monocytes Relative 9 %      Eosinophils Relative 1 %      Basophils Relative 1 %      Neutrophils Absolute 2 66 Thousands/µL      Immature Grans Absolute 0 02 Thousand/uL      Lymphocytes Absolute 2 82 Thousands/µL      Monocytes Absolute 0 56 Thousand/µL      Eosinophils Absolute 0 06 Thousand/µL      Basophils Absolute 0 04 Thousands/µL     Urine Macroscopic, POC [[id number]]  (Abnormal) Collected: 07/06/21 1305    Lab Status: Final result Specimen: Urine Updated: 07/06/21 1306     Color, UA Yellow     Clarity, UA Clear     pH, UA 6 0     Leukocytes, UA Negative     Nitrite, UA Negative     Protein, UA Negative mg/dl      Glucose,  (1/2%) mg/dl      Ketones, UA Negative mg/dl      Urobilinogen, UA 0 2 E U /dl      Bilirubin, UA Negative     Blood, UA Trace     Specific [place], UA 1 025    Narrative:      CLINITEK RESULT                 XR tibia fibula 2 views RIGHT   ED Interpretation by Aubrie Delatorre PA-C (07/06 1402)   No acute pathology       VAS lower limb venous duplex study, unilateral/limited    (Results Pending)              Procedures  Procedures         ED Course  ED Course as of Jul 06 1415   Tue Jul 06, 2021   1410 Venous eval - negative  Discussed results with pt  Pt denies hx of DM- discussed glycosuria and to FU                                              MDM  Number of Diagnoses or Management Options  Ankle sprain: new and requires workup  Glycosuria: new and requires workup  Sprain of lower leg: new and requires workup     Amount and/or Complexity of Data Reviewed  Clinical lab tests: reviewed    Patient Progress  Patient progress: improved      Disposition  Final diagnoses:   Sprain of lower leg   Ankle sprain   Glycosuria     Time reflects when diagnosis was documented in both MDM as applicable and the Disposition within this note     Time User Action Codes Description Comment    7/6/2021  2:11 PM Aubrie Delatorre [S83 90XA] Sprain of lower leg     7/6/2021  2:11 PM Aubrie Delatorre [S93 409A] Ankle sprain     7/6/2021  2:11 PM Aubrie Delatorre [R81] Glycosuria       ED Disposition     ED Disposition Condition Date/Time Comment    Discharge Stable Tue Jul 6, 2021  2:11 PM Antonio Mallory discharge to home/self care              Follow-up Information     Follow up With Specialties Details Why Contact Info Additional Information    Lizy Simmons DO Family Medicine, Internal Medicine   [place] [id number] [facility]   [address]  [address] [facility] [address]  [address] [facility] [place], [address] [place], [place], [address]   Isabella 27, DPM Podiatry, Wound Care   [address]  [address] [facility] [address] [facility] Patient's Medications   Discharge Prescriptions    No medications on file     No discharge procedures on file      PDMP Review     None          ED Provider  Electronically Signed by           Rosalio Baxter PA-C  07/06/21 5417

## 2021-07-06 NOTE — DISCHARGE INSTRUCTIONS
FU with your doctor  Use brace as needed  FU with your doctor for eval of the sugar found in your urine

## 2021-07-06 NOTE — PROGRESS NOTES
[address] Now        NAME: Mosse Pugh is a 39 y o  male  : 1975    MRN: [id number]  DATE: 2021  TIME: 12:02 PM    Assessment and Plan   Right calf pain [M79 [id number]]  1  Right calf pain  Transfer to other facility     40 yo M PMH DVT and PE presents with 6 day hx R unilateral calf pain and swelling  Purple discoloration of foot with some swelling  (+Rafael (+)Calf tenderness  Decreased capillary refill speed  I sent pt to ED for further evaluation    Patient Instructions       Go immediately to ER for further evaluation    Chief Complaint     Chief Complaint   Patient presents with    Leg Pain     pt states he was bit/stung by something in the ocean last week and now is right claf is swollen, ecchymotic, painful  History of Present Illness       Leg Pain   Incident onset: No specific injury  6 days ago [place] and [place] and felt pain in R calf  Gradually worsening  The pain is at a severity of 5/10  The pain has been constant since onset  Pertinent negatives include no inability to bear weight, loss of motion, loss of sensation, muscle weakness, numbness or tingling  The symptoms are aggravated by movement, palpation and weight bearing  States he has noticed swelling of foot as well as purple discoloration of foot and medial aspect of ankle  PMH DVT and PE  Not on any anticoagulants  Review of Systems   Review of Systems   Constitutional: Negative  Negative for chills, fatigue and fever  HENT: Negative  Eyes: Negative  Respiratory: Negative  Negative for chest tightness, shortness of breath and wheezing  Cardiovascular: Negative  Negative for chest pain and palpitations  Gastrointestinal: Negative  Endocrine: Negative  Genitourinary: Negative  Musculoskeletal: Positive for gait problem  Negative for back pain and neck pain  Skin: Negative  Negative for color change, rash and wound     Neurological: Negative for dizziness, tingling, weakness, light-headedness, numbness and headaches  Hematological: Negative  Psychiatric/Behavioral: Negative            Current Medications       Current Outpatient Medications:     albuterol (PROVENTIL HFA,VENTOLIN HFA) 90 mcg/act inhaler, Inhale 2 puffs every 6 (six) hours as needed for wheezing or shortness of breath, Disp: 1 Inhaler, Rfl: 0    ALPRAZolam (XANAX) 0 5 mg tablet, Take 1 tablet (0 5 mg total) by mouth daily as needed for anxiety, Disp: 30 tablet, Rfl: 1    diclofenac sodium (VOLTAREN) 1 %, APPLY 2 GRAMS TO THE AFFECTED AREA(S) BY TOPICAL ROUTE 4 TIMES PER DAY, Disp: , Rfl:     ergocalciferol (VITAMIN D2) 50,000 units, Take 1 capsule (50,000 Units total) by mouth once a week, Disp: 12 capsule, Rfl: 1    fluticasone (FLONASE) 50 mcg/act nasal spray, 1 spray into each nostril daily, Disp: 3 Bottle, Rfl: 3    fluticasone-salmeterol (Wixela Inhub) 100-50 mcg/dose inhaler, Inhale 1 puff 2 (two) times a day Rinse mouth after use , Disp: 1 Inhaler, Rfl: 5    hydrochlorothiazide (HYDRODIURIL) 12 5 mg tablet, Take 1 tablet (12 5 mg total) by mouth daily, Disp: 90 tablet, Rfl: 1    pantoprazole (PROTONIX) 40 mg tablet, Take 1 tablet (40 mg total) by mouth daily, Disp: 90 tablet, Rfl: 1    SUMAtriptan (IMITREX) 25 mg tablet, Take 1 tablet (25 mg total) by mouth as needed for migraine, Disp: 30 tablet, Rfl: 1    traMADol (ULTRAM) 50 mg tablet, Take 1 tablet (50 mg total) by mouth every 6 (six) hours as needed (as needed), Disp: 60 tablet, Rfl: 1    venlafaxine (EFFEXOR-XR) 150 mg 24 hr capsule, Take 2 capsules (300 mg total) by mouth daily, Disp: 180 capsule, Rfl: 1    zolpidem (Ambien) 10 mg tablet, Take 1 tablet (10 mg total) by mouth daily at bedtime as needed for sleep, Disp: 30 tablet, Rfl: 3    Current Allergies     Allergies as of 07/06/2021 - Reviewed 07/06/2021   Allergen Reaction Noted    Lidocaine Anaphylaxis 09/09/2016            The following portions of the patient's history were reviewed and updated as appropriate: allergies, current medications, past family history, past medical history, past social history, past surgical history and problem list      Past Medical History:   Diagnosis Date    Accelerated essential hypertension     Anxiety     Colitis     COVID-19 10/2020    DVT (deep venous thrombosis) ([facility] [address] ) 2015    Elevated ALT measurement     GERD (gastroesophageal reflux disease)     Gynecomastia 7/16/2019    Headache     Holter monitor, abnormal     [facility] 10/8/2016    Migraine     PE (pulmonary throm[facility]embolism) ([facility] ) 2015    Pulmonary embolism ([facility] ) 2/23/2017    Sleep apnea     mild    Status post right breast biopsy 9/4/2019    Syncope     Tobacco dependence due to chewing tobacco 7/2/2019       Past Surgical History:   Procedure Laterality Date    BREAST LUMPECTOMY Right     HERNIA REPAIR Right     groin    KNEE ARTHROSCOPY Left     x2    KNEE SURGERY  10/2015    IN BIOPSY OF BREAST, NEEDLE CORE Right 8/22/2019    Procedure: EXCISIONAL BIOPSY RIGHT BREAST MASS;  Surgeon: Wiliam Fall MD;  Location: [facility];  Service: General    SHOULDER SURGERY Left     3x    VASECTOMY      WISDOM TOOTH EXTRACTION         Family History   Problem Relation Age of Onset   [facility] Migraines Mother     Diabetes Mother     Hypertension Father     Diabetes Father     Cancer Sister     Thyroid cancer Sister 28    Cancer Brother     Hypertension Brother     Liver disease Brother         fatty liver    Cancer Maternal Grandfather     Other Maternal Grandfather         history of travel    Hypertension Paternal Grandmother     Diabetes Paternal Grandmother     Hypertension Paternal Grandfather     Cancer Other     Cancer Maternal Aunt          Medications have been verified          Objective   BP (!) 146/102 Comment: manual  Pulse 91   Temp 97 5 °F (36 4 °C)   Resp 18   Ht 5' 8" (1 727 m)   Wt 104 kg (229 lb)   SpO2 99% BMI 34 82 kg/m²        Physical Exam     Physical Exam  Vitals and nursing note reviewed  Constitutional:       General: He is not in acute distress  Appearance: Normal appearance  He is well-developed  He is not ill-appearing or diaphoretic  HENT:      Head: Normocephalic and atraumatic  Cardiovascular:      Rate and Rhythm: Normal rate and regular rhythm  Heart sounds: Normal heart sounds  Pulmonary:      Effort: Pulmonary effort is normal  No respiratory distress  Breath sounds: Normal breath sounds  No wheezing, rhonchi or rales  Musculoskeletal:      Cervical back: Normal range of motion and neck supple  Right ankle: Swelling present  No deformity, ecchymosis or lacerations  No tenderness  Right Achilles Tendon: Normal  No tenderness or defects  Duarte's test negative  Right foot: Normal range of motion  Swelling (very mild edema when compared to L side) present  No deformity or tenderness  Normal pulse  Legs:    Lymphadenopathy:      Cervical: No cervical adenopathy  Skin:     General: Skin is warm  Capillary Refill: Capillary refill takes less than 2 seconds  Coloration: Skin is not pale  Findings: No rash  Neurological:      Mental Status: He is alert

## 2021-07-09 ENCOUNTER — VBI (OUTPATIENT)
Dept: INTERNAL MEDICINE CLINIC | Age: 46
End: 2021-07-09

## 2021-07-09 NOTE — TELEPHONE ENCOUNTER
Shannon Pitts    ED Visit Information     Ed visit date: 7-6-2021   Diagnosis Description: Sprain of lower leg     Ankle sprain     Glycosuria    In Network? Yes [address]  Discharge status: Home  Discharged with meds ? No  Number of ED visits to date: 2  ED Severity:3     Outreach Information    Outreach successful: No 2  Date letter mailed:covid remote  Date Finalized: 7-    Care Coordination    Follow up appointment with pcp: no    Transportation issues ?  NA    [facility] [employer] type: 3 Day Outreach  [facility] PCP: Yes  Reason Patient went to ED instead of Urgent Care or PCP?: Pt referred by Urgent Care        07/09/2021 11:43 AM Phone (VBI) Zechariah Langford (Self) [phone] (M) Remove  Left Message - LM asking call back for ED FU    By Ayleen Castillo MA    07/13/2021 01:08 PM Phone (VBI) Zechariah Langford (Self) [phone] (M) Remove  Left Message - LM asking call back for ED FU    By Ayleen Castillo MA    07/13/2021 01:08 PM Phone (VBI) Zechariah Langford (Self) [phone] Emmei Proctor) Remove  Left Message - LM asking call back for ED FU    By Ayleen Castillo MA

## 2021-08-05 ENCOUNTER — OFFICE VISIT (OUTPATIENT)
Dept: INTERNAL MEDICINE CLINIC | Facility: CLINIC | Age: 46
End: 2021-08-05
Payer: COMMERCIAL

## 2021-08-05 VITALS
HEART RATE: 84 BPM | WEIGHT: 230 LBS | TEMPERATURE: 98.3 F | DIASTOLIC BLOOD PRESSURE: 90 MMHG | HEIGHT: 68 IN | OXYGEN SATURATION: 94 % | RESPIRATION RATE: 20 BRPM | SYSTOLIC BLOOD PRESSURE: 140 MMHG | BODY MASS INDEX: 34.86 KG/M2

## 2021-08-05 DIAGNOSIS — G47.33 OSA (OBSTRUCTIVE SLEEP APNEA): ICD-10-CM

## 2021-08-05 DIAGNOSIS — E55.9 VITAMIN D DEFICIENCY: ICD-10-CM

## 2021-08-05 DIAGNOSIS — J45.20 MILD INTERMITTENT EXTRINSIC ASTHMA WITHOUT COMPLICATION: ICD-10-CM

## 2021-08-05 DIAGNOSIS — F41.1 GENERALIZED ANXIETY DISORDER: ICD-10-CM

## 2021-08-05 DIAGNOSIS — Z13.220 SCREENING CHOLESTEROL LEVEL: ICD-10-CM

## 2021-08-05 DIAGNOSIS — R81 GLUCOSE FOUND IN URINE ON EXAMINATION: ICD-10-CM

## 2021-08-05 DIAGNOSIS — K21.9 GASTROESOPHAGEAL REFLUX DISEASE WITHOUT ESOPHAGITIS: ICD-10-CM

## 2021-08-05 DIAGNOSIS — F51.01 PRIMARY INSOMNIA: ICD-10-CM

## 2021-08-05 DIAGNOSIS — I10 ESSENTIAL HYPERTENSION: Primary | ICD-10-CM

## 2021-08-05 PROCEDURE — 3080F DIAST BP >= 90 MM HG: CPT | Performed by: FAMILY MEDICINE

## 2021-08-05 PROCEDURE — 1036F TOBACCO NON-USER: CPT | Performed by: FAMILY MEDICINE

## 2021-08-05 PROCEDURE — 3008F BODY MASS INDEX DOCD: CPT | Performed by: FAMILY MEDICINE

## 2021-08-05 PROCEDURE — 99214 OFFICE O/P EST MOD 30 MIN: CPT | Performed by: FAMILY MEDICINE

## 2021-08-05 PROCEDURE — 3077F SYST BP >= 140 MM HG: CPT | Performed by: FAMILY MEDICINE

## 2021-08-05 PROCEDURE — 3725F SCREEN DEPRESSION PERFORMED: CPT | Performed by: FAMILY MEDICINE

## 2021-08-05 RX ORDER — TRAZODONE HYDROCHLORIDE 50 MG/1
50 TABLET ORAL
Qty: 30 TABLET | Refills: 5 | Status: SHIPPED | OUTPATIENT
Start: 2021-08-05

## 2021-08-05 NOTE — PROGRESS NOTES
Assessment/Plan:    No problem-specific Assessment & Plan notes found for this encounter  Problem List Items Addressed This Visit        Digestive    Gastroesophageal reflux disease without esophagitis       Respiratory    Extrinsic asthma    BHARGAV (obstructive sleep apnea)       Cardiovascular and Mediastinum    Essential hypertension - Primary    Relevant Orders    Comprehensive metabolic panel    CBC and differential       Other    Generalized anxiety disorder    Relevant Medications    traZODone (DESYREL) 50 mg tablet    Vitamin D deficiency    Relevant Orders    Vitamin D 25 hydroxy      Other Visit Diagnoses     Primary insomnia        Relevant Medications    traZODone (DESYREL) 50 mg tablet    Glucose found in urine on examination        Relevant Orders    UA (URINE) with reflex to Scope    Screening cholesterol level        Relevant Orders    Lipid panel            Subjective:      Patient ID: Jocelin Baptiste is a 39 y o  male  HPI   Leg Swelling: Now resolved  Had work-up 7/6/21, no DVT  Wife is PT and helped him with exercises  Vitamin-D deficiency, levels have been low persistently however currently it is 14 and he just restarted taking 1000 International Units of vitamin-D daily         Essential hypertension, on hydrochlorothiazide  Went to the hospital and was prescribed another medication that he did not take  He has significant cardiac testing at that time due to elevated blood pressure and states that his blood pressures at home have been good  He feels it is more of an anxiety and agitation issue instead of blood pressure since he has some marital issues  August 2021: Taking HCTZ without issue  No taking pressures at home        Generalized Anxiety Disorder (Brief): The patient is being seen for a routine clinic follow-up of anxiety disorder   Symptoms:  no palpitations,-no chest discomfort,-no trouble breathing,-no trembling,-no paresthesias,-no lightheadedness,-no nausea,-no abdominal discomfort,-no excessive sweating,-no hot flashes,-no racing thoughts,-no excessive worrying,-no fear of dying,-no fear of losing control,-no flashbacks,-no repetitive behaviors-and-no sleep disturbance  The patient is currently asymptomatic  Associated symptoms:  no poor concentration,-no memory impairment,-no avoidance of physical exertion,-no irritability,-no agitation,-no hostility-and-no depression symptoms  Suicidal risk:  no suicidal thoughts  Homicidal risk:  no homicidal thoughts  Current treatment includes selective serotonin-norepinephrine reuptake inhibitors and benzodiazepines  There are no medication side effects  (taking xanax a few times per months, uses ambien at night 2-3 times per week  feels well  ) 9/11/18: update: feels that effexor is working well  Martin Stephens not really needed his Xanax jan 2019: Kymberly Fried been stressful in the marriage at home and due to the stress he decided to have an extramarital affair  Richie Ahumada is now reconciling with his wife in things are better  Richie Ahumada has had more controlled anxiety symptoms and has been out of his Xanax and Ambien but continues to take his Effexor as directed   Prior to his increase in anxiety he was using Xanax very infrequently  July 2019:Has been having some increased marital stress  and going to counseling   Usually his anxiety is very well controlled except when he his wife drinks too much  Sallie Zavala is in denial and not willing to admit her alcohol intake which upsets the whole family life and harm in but he states children are safe and she has a good mom and they have great family and social support  September 2019  Doing well and still going to counseling  Johnathon Plata is not a problem   Effexor was approved and taking medications as described    dec 2019: has been using xanax a little more often at night to help with sleep when he doesn't use the [place]   Ledora Graft stress levels are good and his marital life is back to normal   [address], things have been relatively stable on his diet is well controlled   Except for the corona virus restrictions there is no added stress   He has been working during the pandemic  April 2021, stable on present dosages  No HI or SI no breakthrough symptoms however does have frequent arguments with his wife  He is looking in to counseling  August 2021: States he has had increased stress at work  He continues to have poor sleep with frequent night time awakenings  He takes his Ambien very rarely, only when absolutely needing it  Used it the other  Having extra stress at work  Taking Effexor       GERD: Taking Protonix daily "for a while " Denies break though symptoms  Eats very infrequently  Patient says he is always on the go and does not have time to eat  He ate last night and that was the first time he ate in 2 days  SOB: Stopped 2 months after prescribed     The following portions of the patient's history were reviewed and updated as appropriate: allergies, current medications, past family history, past medical history, past social history, past surgical history and problem list     Review of Systems   Constitutional: Positive for fatigue  Genitourinary: Negative for difficulty urinating and dysuria  Neurological: Negative for dizziness, light-headedness and numbness         Constitutional:  Denies fever or chills   Eyes:  Denies double or blurry vision  HENT:  Denies nasal congestion or sore throat   Respiratory:  Denies cough or shortness of breath or wheezing  Cardiovascular:  Denies palpitations or chest pain  GI:  Denies abdominal pain, nausea, or vomiting, no loose stools, no reflux  Integument:  Denies rash , no open areas  Neurologic:  Denies headache or focal weakness, no dizziness  : no dysuria              Objective:  /90 (BP Location: Left arm, Patient Position: Sitting, Cuff Size: Large)   Pulse 84   Temp 98 3 °F (36 8 °C) (Temporal)   Resp 20   Ht 5' 8" (1 727 m)   Wt 104 kg (230 lb)   SpO2 94%   BMI 34 97 kg/m²          Physical Exam      Constitutional:  Well developed, well nourished, no acute distress, non-toxic appearance   Eyes:  PERRL, conjunctiva normal , non icteric sclera  HENT:  Atraumatic, oropharynx moist  Neck-  supple   Respiratory:  CTA b/l, normal breath sounds, no rales, no wheezing   Cardiovascular:  RRR, no murmurs, no LE edema b/l  GI:  Soft, nondistended, normal bowel sounds x 4, nontender, no organomegaly, no mass, no rebound, no guarding   Neurologic:  no focal deficits noted   Psychiatric:  Speech and behavior appropriate , AAO x 3

## 2021-09-09 ENCOUNTER — PROCEDURE VISIT (OUTPATIENT)
Dept: INTERNAL MEDICINE CLINIC | Facility: CLINIC | Age: 46
End: 2021-09-09
Payer: COMMERCIAL

## 2021-09-09 VITALS
HEART RATE: 89 BPM | WEIGHT: 231 LBS | BODY MASS INDEX: 34.21 KG/M2 | SYSTOLIC BLOOD PRESSURE: 140 MMHG | TEMPERATURE: 97.7 F | DIASTOLIC BLOOD PRESSURE: 100 MMHG | HEIGHT: 69 IN | OXYGEN SATURATION: 98 %

## 2021-09-09 DIAGNOSIS — L81.9 ATYPICAL PIGMENTED SKIN LESION: Primary | ICD-10-CM

## 2021-09-09 DIAGNOSIS — F41.9 ANXIETY: ICD-10-CM

## 2021-09-09 DIAGNOSIS — K21.9 GASTROESOPHAGEAL REFLUX DISEASE WITHOUT ESOPHAGITIS: ICD-10-CM

## 2021-09-09 DIAGNOSIS — I10 ESSENTIAL HYPERTENSION: ICD-10-CM

## 2021-09-09 DIAGNOSIS — L57.0 ACTINIC KERATOSIS: ICD-10-CM

## 2021-09-09 DIAGNOSIS — L81.4 SOLAR LENTIGO: ICD-10-CM

## 2021-09-09 PROCEDURE — 11312 SHAVE SKIN LESION 1.1-2.0 CM: CPT | Performed by: FAMILY MEDICINE

## 2021-09-09 PROCEDURE — 3008F BODY MASS INDEX DOCD: CPT | Performed by: FAMILY MEDICINE

## 2021-09-09 RX ORDER — VENLAFAXINE HYDROCHLORIDE 150 MG/1
300 CAPSULE, EXTENDED RELEASE ORAL DAILY
Qty: 180 CAPSULE | Refills: 1 | Status: SHIPPED | OUTPATIENT
Start: 2021-09-09 | End: 2022-03-15 | Stop reason: SDUPTHER

## 2021-09-09 RX ORDER — ALPRAZOLAM 0.5 MG/1
0.5 TABLET ORAL DAILY PRN
Qty: 30 TABLET | Refills: 1 | Status: SHIPPED | OUTPATIENT
Start: 2021-09-09 | End: 2022-03-15 | Stop reason: SDUPTHER

## 2021-09-09 RX ORDER — HYDROCHLOROTHIAZIDE 12.5 MG/1
12.5 TABLET ORAL DAILY
Qty: 90 TABLET | Refills: 1 | Status: SHIPPED | OUTPATIENT
Start: 2021-09-09 | End: 2022-03-15 | Stop reason: SDUPTHER

## 2021-09-09 RX ORDER — PANTOPRAZOLE SODIUM 40 MG/1
40 TABLET, DELAYED RELEASE ORAL DAILY
Qty: 90 TABLET | Refills: 1 | Status: SHIPPED | OUTPATIENT
Start: 2021-09-09 | End: 2022-03-15 | Stop reason: SDUPTHER

## 2021-09-10 PROCEDURE — 88305 TISSUE EXAM BY PATHOLOGIST: CPT | Performed by: PATHOLOGY

## 2021-09-10 PROCEDURE — 88342 IMHCHEM/IMCYTCHM 1ST ANTB: CPT | Performed by: PATHOLOGY

## 2021-09-10 PROCEDURE — 88341 IMHCHEM/IMCYTCHM EA ADD ANTB: CPT | Performed by: PATHOLOGY

## 2021-09-24 PROBLEM — L57.0 ACTINIC KERATOSIS: Status: ACTIVE | Noted: 2021-09-24

## 2021-09-24 PROBLEM — L81.4 SOLAR LENTIGO: Status: ACTIVE | Noted: 2021-09-24

## 2021-12-07 ENCOUNTER — OFFICE VISIT (OUTPATIENT)
Dept: INTERNAL MEDICINE CLINIC | Age: 46
End: 2021-12-07
Payer: COMMERCIAL

## 2021-12-07 VITALS
HEIGHT: 69 IN | OXYGEN SATURATION: 99 % | WEIGHT: 230.2 LBS | HEART RATE: 79 BPM | TEMPERATURE: 98.5 F | DIASTOLIC BLOOD PRESSURE: 88 MMHG | BODY MASS INDEX: 34.1 KG/M2 | SYSTOLIC BLOOD PRESSURE: 122 MMHG

## 2021-12-07 DIAGNOSIS — G43.109 MIGRAINE WITH AURA AND WITHOUT STATUS MIGRAINOSUS, NOT INTRACTABLE: ICD-10-CM

## 2021-12-07 DIAGNOSIS — E55.9 VITAMIN D DEFICIENCY: ICD-10-CM

## 2021-12-07 DIAGNOSIS — I10 ESSENTIAL HYPERTENSION: ICD-10-CM

## 2021-12-07 DIAGNOSIS — Z23 NEED FOR INFLUENZA VACCINATION: Primary | ICD-10-CM

## 2021-12-07 DIAGNOSIS — F41.1 GENERALIZED ANXIETY DISORDER: ICD-10-CM

## 2021-12-07 DIAGNOSIS — R06.02 SOB (SHORTNESS OF BREATH) ON EXERTION: ICD-10-CM

## 2021-12-07 DIAGNOSIS — J45.20 MILD INTERMITTENT EXTRINSIC ASTHMA WITHOUT COMPLICATION: ICD-10-CM

## 2021-12-07 PROCEDURE — 1036F TOBACCO NON-USER: CPT | Performed by: FAMILY MEDICINE

## 2021-12-07 PROCEDURE — 90682 RIV4 VACC RECOMBINANT DNA IM: CPT

## 2021-12-07 PROCEDURE — 3079F DIAST BP 80-89 MM HG: CPT | Performed by: FAMILY MEDICINE

## 2021-12-07 PROCEDURE — 90471 IMMUNIZATION ADMIN: CPT

## 2021-12-07 PROCEDURE — 99214 OFFICE O/P EST MOD 30 MIN: CPT | Performed by: FAMILY MEDICINE

## 2021-12-07 PROCEDURE — 3008F BODY MASS INDEX DOCD: CPT | Performed by: FAMILY MEDICINE

## 2021-12-07 PROCEDURE — 3074F SYST BP LT 130 MM HG: CPT | Performed by: FAMILY MEDICINE

## 2021-12-07 PROCEDURE — 94640 AIRWAY INHALATION TREATMENT: CPT

## 2021-12-07 RX ORDER — TRAMADOL HYDROCHLORIDE 50 MG/1
50 TABLET ORAL EVERY 6 HOURS PRN
Qty: 60 TABLET | Refills: 1 | Status: SHIPPED | OUTPATIENT
Start: 2021-12-07

## 2021-12-07 RX ORDER — LEVALBUTEROL INHALATION SOLUTION 1.25 MG/3ML
1.25 SOLUTION RESPIRATORY (INHALATION) ONCE
Status: COMPLETED | OUTPATIENT
Start: 2021-12-07 | End: 2021-12-07

## 2021-12-07 RX ADMIN — LEVALBUTEROL INHALATION SOLUTION 1.25 MG: 1.25 SOLUTION RESPIRATORY (INHALATION) at 17:38

## 2022-03-03 ENCOUNTER — APPOINTMENT (OUTPATIENT)
Dept: LAB | Facility: IMAGING CENTER | Age: 47
End: 2022-03-03
Payer: COMMERCIAL

## 2022-03-03 DIAGNOSIS — Z13.220 SCREENING CHOLESTEROL LEVEL: ICD-10-CM

## 2022-03-03 DIAGNOSIS — J45.20 MILD INTERMITTENT EXTRINSIC ASTHMA WITHOUT COMPLICATION: ICD-10-CM

## 2022-03-03 DIAGNOSIS — I10 ESSENTIAL HYPERTENSION: ICD-10-CM

## 2022-03-03 DIAGNOSIS — E55.9 VITAMIN D DEFICIENCY: ICD-10-CM

## 2022-03-03 DIAGNOSIS — R73.09 ABNORMAL GLUCOSE: ICD-10-CM

## 2022-03-03 LAB
25(OH)D3 SERPL-MCNC: 33.7 NG/ML (ref 30–100)
ALBUMIN SERPL BCP-MCNC: 4.1 G/DL (ref 3.5–5)
ALP SERPL-CCNC: 85 U/L (ref 46–116)
ALT SERPL W P-5'-P-CCNC: 90 U/L (ref 12–78)
ANION GAP SERPL CALCULATED.3IONS-SCNC: 4 MMOL/L (ref 4–13)
AST SERPL W P-5'-P-CCNC: 43 U/L (ref 5–45)
BACTERIA UR QL AUTO: ABNORMAL /HPF
BASOPHILS # BLD AUTO: 0.04 THOUSANDS/ΜL (ref 0–0.1)
BASOPHILS NFR BLD AUTO: 1 % (ref 0–1)
BILIRUB SERPL-MCNC: 0.5 MG/DL (ref 0.2–1)
BILIRUB UR QL STRIP: NEGATIVE
BUN SERPL-MCNC: 18 MG/DL (ref 5–25)
CALCIUM SERPL-MCNC: 9.2 MG/DL (ref 8.3–10.1)
CHLORIDE SERPL-SCNC: 103 MMOL/L (ref 100–108)
CHOLEST SERPL-MCNC: 190 MG/DL
CLARITY UR: CLEAR
CO2 SERPL-SCNC: 31 MMOL/L (ref 21–32)
COLOR UR: YELLOW
CREAT SERPL-MCNC: 1.22 MG/DL (ref 0.6–1.3)
EOSINOPHIL # BLD AUTO: 0.06 THOUSAND/ΜL (ref 0–0.61)
EOSINOPHIL NFR BLD AUTO: 1 % (ref 0–6)
ERYTHROCYTE [DISTWIDTH] IN BLOOD BY AUTOMATED COUNT: 12.4 % (ref 11.6–15.1)
GFR SERPL CREATININE-BSD FRML MDRD: 71 ML/MIN/1.73SQ M
GLUCOSE P FAST SERPL-MCNC: 127 MG/DL (ref 65–99)
GLUCOSE UR STRIP-MCNC: NEGATIVE MG/DL
HCT VFR BLD AUTO: 45.5 % (ref 36.5–49.3)
HDLC SERPL-MCNC: 56 MG/DL
HGB BLD-MCNC: 14.8 G/DL (ref 12–17)
HGB UR QL STRIP.AUTO: NEGATIVE
IMM GRANULOCYTES # BLD AUTO: 0.01 THOUSAND/UL (ref 0–0.2)
IMM GRANULOCYTES NFR BLD AUTO: 0 % (ref 0–2)
KETONES UR STRIP-MCNC: NEGATIVE MG/DL
LDLC SERPL CALC-MCNC: 117 MG/DL (ref 0–100)
LEUKOCYTE ESTERASE UR QL STRIP: NEGATIVE
LYMPHOCYTES # BLD AUTO: 2.78 THOUSANDS/ΜL (ref 0.6–4.47)
LYMPHOCYTES NFR BLD AUTO: 50 % (ref 14–44)
MCH RBC QN AUTO: 30.8 PG (ref 26.8–34.3)
MCHC RBC AUTO-ENTMCNC: 32.5 G/DL (ref 31.4–37.4)
MCV RBC AUTO: 95 FL (ref 82–98)
MONOCYTES # BLD AUTO: 0.38 THOUSAND/ΜL (ref 0.17–1.22)
MONOCYTES NFR BLD AUTO: 7 % (ref 4–12)
MUCOUS THREADS UR QL AUTO: ABNORMAL
NEUTROPHILS # BLD AUTO: 2.29 THOUSANDS/ΜL (ref 1.85–7.62)
NEUTS SEG NFR BLD AUTO: 41 % (ref 43–75)
NITRITE UR QL STRIP: NEGATIVE
NON-SQ EPI CELLS URNS QL MICRO: ABNORMAL /HPF
NONHDLC SERPL-MCNC: 134 MG/DL
NRBC BLD AUTO-RTO: 0 /100 WBCS
PH UR STRIP.AUTO: 6 [PH]
PLATELET # BLD AUTO: 280 THOUSANDS/UL (ref 149–390)
PMV BLD AUTO: 10.5 FL (ref 8.9–12.7)
POTASSIUM SERPL-SCNC: 4.2 MMOL/L (ref 3.5–5.3)
PROT SERPL-MCNC: 7.9 G/DL (ref 6.4–8.2)
PROT UR STRIP-MCNC: ABNORMAL MG/DL
RBC # BLD AUTO: 4.8 MILLION/UL (ref 3.88–5.62)
RBC #/AREA URNS AUTO: ABNORMAL /HPF
SODIUM SERPL-SCNC: 138 MMOL/L (ref 136–145)
SP GR UR STRIP.AUTO: 1.02 (ref 1–1.03)
TRIGL SERPL-MCNC: 84 MG/DL
UROBILINOGEN UR STRIP-ACNC: <2 MG/DL
WBC # BLD AUTO: 5.56 THOUSAND/UL (ref 4.31–10.16)
WBC #/AREA URNS AUTO: ABNORMAL /HPF

## 2022-03-03 PROCEDURE — 80053 COMPREHEN METABOLIC PANEL: CPT

## 2022-03-03 PROCEDURE — 81001 URINALYSIS AUTO W/SCOPE: CPT | Performed by: FAMILY MEDICINE

## 2022-03-03 PROCEDURE — 82306 VITAMIN D 25 HYDROXY: CPT

## 2022-03-03 PROCEDURE — 80061 LIPID PANEL: CPT

## 2022-03-03 PROCEDURE — 86003 ALLG SPEC IGE CRUDE XTRC EA: CPT

## 2022-03-03 PROCEDURE — 85025 COMPLETE CBC W/AUTO DIFF WBC: CPT

## 2022-03-03 PROCEDURE — 36415 COLL VENOUS BLD VENIPUNCTURE: CPT

## 2022-03-03 PROCEDURE — 83036 HEMOGLOBIN GLYCOSYLATED A1C: CPT

## 2022-03-03 PROCEDURE — 82785 ASSAY OF IGE: CPT

## 2022-03-04 LAB
ALMOND IGE QN: <0.1 KUA/I
CASHEW NUT IGE QN: <0.1 KUA/I
CODFISH IGE QN: <0.1 KUA/I
EGG WHITE IGE QN: <0.1 KUA/I
EST. AVERAGE GLUCOSE BLD GHB EST-MCNC: 128 MG/DL
GLUTEN IGE QN: <0.1 KUA/I
HAZELNUT IGE QN: <0.1 KUA/L
HBA1C MFR BLD: 6.1 %
MILK IGE QN: <0.1 KUA/I
PEANUT IGE QN: <0.1 KUA/I
SALMON IGE QN: <0.1 KUA/I
SCALLOP IGE QN: <0.1 KUA/L
SESAME SEED IGE QN: <0.1 KUA/I
SHRIMP IGE QN: <0.1 KUA/L
SOYBEAN IGE QN: <0.1 KUA/I
TOTAL IGE SMQN RAST: 9.58 KU/L (ref 0–113)
TUNA IGE QN: <0.1 KUA/I
WALNUT IGE QN: <0.1 KUA/I
WHEAT IGE QN: <0.1 KUA/I

## 2022-03-15 DIAGNOSIS — I10 ESSENTIAL HYPERTENSION: ICD-10-CM

## 2022-03-15 DIAGNOSIS — F41.9 ANXIETY: ICD-10-CM

## 2022-03-15 DIAGNOSIS — K21.9 GASTROESOPHAGEAL REFLUX DISEASE WITHOUT ESOPHAGITIS: ICD-10-CM

## 2022-03-15 DIAGNOSIS — F51.01 PRIMARY INSOMNIA: ICD-10-CM

## 2022-03-15 RX ORDER — VENLAFAXINE HYDROCHLORIDE 150 MG/1
300 CAPSULE, EXTENDED RELEASE ORAL DAILY
Qty: 180 CAPSULE | Refills: 1 | Status: SHIPPED | OUTPATIENT
Start: 2022-03-15

## 2022-03-15 RX ORDER — ALPRAZOLAM 0.5 MG/1
0.5 TABLET ORAL DAILY PRN
Qty: 30 TABLET | Refills: 1 | Status: SHIPPED | OUTPATIENT
Start: 2022-03-15

## 2022-03-15 RX ORDER — HYDROCHLOROTHIAZIDE 12.5 MG/1
12.5 TABLET ORAL DAILY
Qty: 90 TABLET | Refills: 1 | Status: SHIPPED | OUTPATIENT
Start: 2022-03-15

## 2022-03-15 RX ORDER — ZOLPIDEM TARTRATE 10 MG/1
10 TABLET ORAL
Qty: 30 TABLET | Refills: 3 | Status: SHIPPED | OUTPATIENT
Start: 2022-03-15

## 2022-03-15 RX ORDER — PANTOPRAZOLE SODIUM 40 MG/1
40 TABLET, DELAYED RELEASE ORAL DAILY
Qty: 90 TABLET | Refills: 1 | Status: SHIPPED | OUTPATIENT
Start: 2022-03-15

## 2022-06-13 ENCOUNTER — OFFICE VISIT (OUTPATIENT)
Dept: INTERNAL MEDICINE CLINIC | Age: 47
End: 2022-06-13

## 2022-06-13 VITALS
TEMPERATURE: 98.6 F | SYSTOLIC BLOOD PRESSURE: 132 MMHG | HEIGHT: 69 IN | BODY MASS INDEX: 32.88 KG/M2 | WEIGHT: 222 LBS | OXYGEN SATURATION: 97 % | DIASTOLIC BLOOD PRESSURE: 68 MMHG | HEART RATE: 85 BPM

## 2022-06-13 DIAGNOSIS — E55.9 VITAMIN D DEFICIENCY: ICD-10-CM

## 2022-06-13 DIAGNOSIS — F11.20 CONTINUOUS OPIOID DEPENDENCE (HCC): ICD-10-CM

## 2022-06-13 DIAGNOSIS — I10 ESSENTIAL HYPERTENSION: Primary | ICD-10-CM

## 2022-06-13 DIAGNOSIS — G43.109 MIGRAINE WITH AURA AND WITHOUT STATUS MIGRAINOSUS, NOT INTRACTABLE: ICD-10-CM

## 2022-06-13 DIAGNOSIS — J45.20 MILD INTERMITTENT EXTRINSIC ASTHMA WITHOUT COMPLICATION: ICD-10-CM

## 2022-06-13 DIAGNOSIS — K21.9 GASTROESOPHAGEAL REFLUX DISEASE WITHOUT ESOPHAGITIS: ICD-10-CM

## 2022-06-13 DIAGNOSIS — R09.82 POST-NASAL DRIP: ICD-10-CM

## 2022-06-13 DIAGNOSIS — U07.1 COVID-19 VIRUS INFECTION: ICD-10-CM

## 2022-06-13 DIAGNOSIS — R73.09 ABNORMAL GLUCOSE: ICD-10-CM

## 2022-06-13 DIAGNOSIS — G47.33 OSA (OBSTRUCTIVE SLEEP APNEA): ICD-10-CM

## 2022-06-13 DIAGNOSIS — R06.83 SNORING: ICD-10-CM

## 2022-06-13 RX ORDER — FLUTICASONE PROPIONATE 50 MCG
1 SPRAY, SUSPENSION (ML) NASAL DAILY
Qty: 18.2 ML | Refills: 3 | Status: SHIPPED | OUTPATIENT
Start: 2022-06-13

## 2022-06-13 RX ORDER — MONTELUKAST SODIUM 10 MG/1
10 TABLET ORAL
Qty: 30 TABLET | Refills: 5 | Status: SHIPPED | OUTPATIENT
Start: 2022-06-13

## 2022-06-13 NOTE — PROGRESS NOTES
Assessment/Plan:    1  Essential hypertension    2  Migraine with aura and without status migrainosus, not intractable    3  Mild intermittent extrinsic asthma without complication  -     montelukast (SINGULAIR) 10 mg tablet; Take 1 tablet (10 mg total) by mouth daily at bedtime    4  BHARGAV (obstructive sleep apnea)    5  Vitamin D deficiency    6  Snoring    7  Post-nasal drip  -     fluticasone (FLONASE) 50 mcg/act nasal spray; 1 spray into each nostril daily Please fill 3 with 3 refills for a 90 day supply    8  Abnormal glucose  -     Hemoglobin A1C; Future    9  Continuous opioid dependence ([facility] [address] )    10  COVID-19 virus infection    11  Gastroesophageal reflux disease without esophagitis      BMI Counseling: Body mass index is 32 78 kg/m²  The BMI is above normal  Nutrition recommendations include moderation in carbohydrate intake  Exercise recommendations include exercising 3-5 times per week  No pharmacotherapy was ordered  Rationale for BMI follow-up plan is due to patient being overweight or obese  Depression Screening and Follow-up Plan: Patient was screened for depression during today's encounter  They screened negative with a PHQ-2 score of 0  There are no Patient Instructions on file for this visit  Return for Recheck  Subjective:      Patient ID: Cary Born is a 41 y o  male      Chief Complaint   Patient presents with   • Follow-up     6 month fu - labs 3/3/22-htn -  BMI FU NEEDED        HPI      Vitamin-D deficiency, levels have been low persistently however currently it is 14 and he just restarted taking 1000 International Units of vitamin-D daily    December 2021, due for labs  June 2022, last check was in March 2022 and vitamin D level was above 30     Essential hypertension, on hydrochlorothiazide   Went to the hospital and was prescribed another medication that he did not take  Preston Burrell has significant cardiac testing at that time due to elevated blood pressure and states that his blood pressures at home have been good  [place] feels it is more of an anxiety and agitation issue instead of blood pressure since he has some marital issues  August 2021: Taking HCTZ without issue  No taking pressures at home    December 2021, due for labs  Compliant with medications  June 2022, well controlled with present care      Generalized Anxiety Disorder (Brief): The patient is being seen for a routine clinic follow-up of anxiety disorder  Symptoms:  no palpitations,-no chest discomfort,-no trouble breathing,-no trembling,-no paresthesias,-no lightheadedness,-no nausea,-no abdominal discomfort,-no excessive sweating,-no hot flashes,-no racing thoughts,-no excessive worrying,-no fear of dying,-no fear of losing control,-no flashbacks,-no repetitive behaviors-and-no sleep disturbance  The patient is currently asymptomatic  Associated symptoms:  no poor concentration,-no memory impairment,-no avoidance of physical exertion,-no irritability,-no agitation,-no hostility-and-no depression symptoms  Suicidal risk:  no suicidal thoughts  Homicidal risk:  no homicidal thoughts  Current treatment includes selective serotonin-norepinephrine reuptake inhibitors and benzodiazepines  There are no medication side effects  (taking xanax a few times per months, uses ambien at night 2-3 times per week   feels well  ) 9/11/18: update: feels that effexor is working well  Guerreromaxx Garcia not really needed his Xanax jan 2019: Mary Ann Herlinda been stressful in the marriage at home and due to the stress he decided to have an extramarital affair  [place] is now reconciling with his wife in things are better  [place] has had more controlled anxiety symptoms and has been out of his Xanax and Ambien but continues to take his Effexor as directed  Nida No to his increase in anxiety he was using Xanax very infrequently   [address], things have been relatively stable on his diet is well controlled   Except for the corona virus restrictions there is no added stress   He has been working during the pandemic  April 2021, stable on present dosages   No HI or SI no breakthrough symptoms however does have frequent arguments with his wife  [facility] is looking in to counseling  August 2021: States he has had increased stress at work  He continues to have poor sleep with frequent night time awakenings  He takes his Ambien very rarely, only when absolutely needing it  Used it the other  Having extra stress at work  Taking Effexor    December 2021, doing well  Takes Ambien very rarely  Compliant with medications  June 2022, doing well with current medications  Stress is at its same level  No HI or SI    Asthma without exacerbation, patient on daily inhaler but ran out few months ago and never called us for follow-up were refills  He feels good when he takes a generic Advair but has been out for few months  Feels that he is having some shortness of breath and difficulty getting air in  His PFT was markedly abnormal   Last allergy testing for environmental allergies was negative  June 2022, not very well controlled  Still having dyspnea on exertion and wheezing at night     Migraine headaches, no recent flare up  Has Imitrex which she does not use but does use tramadol at the onset of headaches  June 2022, stable, no issues     GERD: Taking Protonix daily "for a while " Denies break though symptoms       The following portions of the patient's history were reviewed and updated as appropriate: allergies, current medications, past family history, past medical history, past social history, past surgical history and problem list     Review of Systems      Constitutional:  Denies fever or chills , + intentional weight loss   Eyes:  Denies double , blurry vision or eye pain  HENT:  Denies nasal congestion or sore throat   Respiratory:  Denies cough or shortness of breath or wheezing  Cardiovascular:  Denies palpitations or chest pain  GI:  Denies abdominal pain, nausea, or vomiting, no loose stools, no reflux  Integument:  Denies rash , no open areas  Neurologic:  Denies headache or focal weakness, no dizziness  : no dysuria, or hematuria      Current Outpatient Medications   Medication Sig Dispense Refill   • albuterol (PROVENTIL HFA,VENTOLIN HFA) 90 mcg/act inhaler Inhale 2 puffs every 6 (six) hours as needed for wheezing or shortness of breath 1 Inhaler 0   • fluticasone (FLONASE) 50 mcg/act nasal spray 1 spray into each nostril daily Please fill 3 with 3 refills for a 90 day supply 18 2 mL 3   • montelukast (SINGULAIR) 10 mg tablet Take 1 tablet (10 mg total) by mouth daily at bedtime 30 tablet 5   • SUMAtriptan (IMITREX) 25 mg tablet Take 1 tablet (25 mg total) by mouth as needed for migraine 30 tablet 1   • traZODone (DESYREL) 50 mg tablet Take 1 tablet (50 mg total) by mouth daily at bedtime 30 tablet 5   • ALPRAZolam (XANAX) 0 5 mg tablet Take 1 tablet (0 5 mg total) by mouth daily as needed for anxiety 30 tablet 1   • amoxicillin (AMOXIL) 875 mg tablet Take 1 tablet (875 mg total) by mouth 2 (two) times a day for 7 days 14 tablet 0   • ergocalciferol (VITAMIN D2) 50,000 units Take 1 capsule (50,000 Units total) by mouth once a week 12 capsule 1   • Fluticasone-Salmeterol (Advair) 500-50 mcg/dose inhaler Inhale 1 puff 2 (two) times a day Rinse mouth after use  60 blister 5   • hydrochlorothiazide (HYDRODIURIL) 12 5 mg tablet Take 1 tablet (12 5 mg total) by mouth daily 90 tablet 1   • naloxone (NARCAN) 4 mg/0 1 mL nasal spray Administer 1 spray into a nostril  If no response after 2-3 minutes, give another dose in the other nostril using a new spray   1 each 1   • pantoprazole (PROTONIX) 40 mg tablet Take 1 tablet (40 mg total) by mouth daily 90 tablet 1   • traMADol (ULTRAM) 50 mg tablet Take 1 tablet (50 mg total) by mouth every 6 (six) hours as needed (as needed) 60 tablet 1   • venlafaxine (EFFEXOR-XR) 150 mg 24 hr capsule Take 2 capsules (300 mg total) by mouth daily 180 capsule 1   • zolpidem (Ambien) 10 mg tablet Take 1 tablet (10 mg total) by mouth daily at bedtime as needed for sleep 30 tablet 3     No current facility-administered medications for this visit         Objective:    /68 (BP Location: Left arm, Patient Position: Sitting, Cuff Size: Standard)   Pulse 85   Temp 98 6 °F (37 °C) (Temporal)   Ht 5' 9" (1 753 m)   Wt 101 kg (222 lb)   SpO2 97%   BMI 32 78 kg/m²        Physical Exam       Constitutional:  Well developed, well nourished, no acute distress, non-toxic appearance   Eyes:  PERRL, conjunctiva normal , non icteric sclera  HENT:  Atraumatic, oropharynx moist  Neck-  supple   Respiratory:  CTA b/l, normal breath sounds, no rales, no wheezing   Cardiovascular:  RRR, no murmurs, no LE edema b/l  GI:  Soft, nondistended, normal bowel sounds x 4, nontender, no organomegaly, no mass, no rebound, no guarding   Neurologic:  no focal deficits noted   Psychiatric:  Speech and behavior appropriate , AAO x 3        [employer]

## 2022-06-16 ENCOUNTER — OFFICE VISIT (OUTPATIENT)
Dept: INTERNAL MEDICINE CLINIC | Age: 47
End: 2022-06-16
Payer: COMMERCIAL

## 2022-06-16 VITALS
HEIGHT: 69 IN | BODY MASS INDEX: 33.18 KG/M2 | SYSTOLIC BLOOD PRESSURE: 122 MMHG | WEIGHT: 224 LBS | HEART RATE: 91 BPM | DIASTOLIC BLOOD PRESSURE: 88 MMHG | TEMPERATURE: 98.3 F | OXYGEN SATURATION: 98 %

## 2022-06-16 DIAGNOSIS — L60.0 INGROWN TOENAIL: Primary | ICD-10-CM

## 2022-06-16 PROCEDURE — 99213 OFFICE O/P EST LOW 20 MIN: CPT | Performed by: STUDENT IN AN ORGANIZED HEALTH CARE EDUCATION/TRAINING PROGRAM

## 2022-06-16 RX ORDER — SULFAMETHOXAZOLE AND TRIMETHOPRIM 800; 160 MG/1; MG/1
1 TABLET ORAL EVERY 12 HOURS SCHEDULED
Qty: 14 TABLET | Refills: 0 | Status: SHIPPED | OUTPATIENT
Start: 2022-06-16 | End: 2022-06-23

## 2022-06-16 NOTE — ASSESSMENT & PLAN NOTE
Noted couple days ago  Pus discharge when patient squeezed it  Now with evidence of infection  Will treat with 7 day course of Bactrim and refer to Podiatry

## 2022-06-16 NOTE — PROGRESS NOTES
Assessment/Plan:    Ingrown toenail  Noted couple days ago  Pus discharge when patient squeezed it  Now with evidence of infection  Will treat with 7 day course of Bactrim and refer to Podiatry  Diagnoses and all orders for this visit:    Ingrown toenail  -     Ambulatory Referral to Podiatry; Future  -     sulfamethoxazole-trimethoprim (BACTRIM DS) 800-160 mg per tablet; Take 1 tablet by mouth every 12 (twelve) hours for 7 days        Subjective:   Chief Complaint   Patient presents with    Ingrown Toenail     Since Tues, Great toe on r foot, hurting very badly, infected        Patient ID: Pillo Peguero is a 42 y o  male  HPI     Patient is here today for same day visit for infected ingrown toenail of the great toe of the right knee  Symptoms started on Tuesday  Toenail was painful and swollen  When patient squeezed his toenail, there was pusy discharge  No fevers or chills  No other complaints  The following portions of the patient's history were reviewed and updated as appropriate: allergies, current medications, past family history, past medical history, past social history, past surgical history and problem list     Review of Systems   Constitutional: Negative for chills and fever  HENT: Negative for ear pain and sore throat  Eyes: Negative for pain and visual disturbance  Respiratory: Negative for cough and shortness of breath  Cardiovascular: Negative for chest pain and palpitations  Gastrointestinal: Negative for abdominal pain and vomiting  Genitourinary: Negative for dysuria and hematuria  Musculoskeletal: Negative for arthralgias and back pain  Skin: Negative for color change and rash  Neurological: Negative for seizures and syncope  All other systems reviewed and are negative          Past Medical History:   Diagnosis Date    Accelerated essential hypertension     Anxiety     Colitis     COVID-19 10/2020    [facility]    Elevated ALT measurement     GERD (gastroesophageal reflux disease)     Gynecomastia 7/16/2019    Headache     Holter monitor, abnormal     Left leg DVT (HCC) 10/8/2016    Migraine     PE [facility]pulmonary [facility]embolism[facility] 2015    Pulmonary embolism (HCC) 2/23/2017    Sleep apnea     mild    Status post right breast biopsy 9/4/2019    Syncope     Tobacco dependence due to chewing tobacco 7/2/2019       Past Surgical History:   Procedure Laterality Date    BREAST LUMPECTOMY Right     HERNIA REPAIR Right     groin    KNEE ARTHROSCOPY Left     x2    KNEE SURGERY  10/2015    IA BIOPSY OF BREAST, NEEDLE CORE Right 8/22/2019    Procedure: EXCISIONAL BIOPSY RIGHT BREAST MASS;  Surgeon: Arik Hernandez MD;  Location: [facility];  Service: General    SHOULDER SURGERY Left     3x    VASECTOMY      WISDOM TOOTH EXTRACTION         Current Outpatient Medications   Medication Instructions    albuterol (PROVENTIL HFA,VENTOLIN HFA) 90 mcg/act inhaler 2 puffs, Inhalation, Every 6 hours PRN    ALPRAZolam (XANAX) 0 5 mg, Oral, Daily PRN    diclofenac sodium (VOLTAREN) 1 % APPLY 2 GRAMS TO THE AFFECTED AREA(S) BY TOPICAL ROUTE 4 TIMES PER DAY    ergocalciferol (VITAMIN D2) 50,000 Units, Oral, Weekly    fluticasone (FLONASE) 50 mcg/act nasal spray 1 spray, Nasal, Daily, Please fill 3 with 3 refills for a 90 day supply    fluticasone-salmeterol (Wixela Inhub) 100-50 mcg/dose inhaler 1 puff, Inhalation, 2 times daily, Rinse mouth after use      hydrochlorothiazide (HYDRODIURIL) 12 5 mg, Oral, Daily    montelukast (SINGULAIR) 10 mg, Oral, Daily at bedtime    pantoprazole (PROTONIX) 40 mg, Oral, Daily    sulfamethoxazole-trimethoprim (BACTRIM DS) 800-160 mg per tablet 1 tablet, Oral, Every 12 hours scheduled    SUMAtriptan (IMITREX) 25 mg, Oral, As needed    traMADol (ULTRAM) 50 mg, Oral, Every 6 hours PRN    traZODone (DESYREL) 50 mg, Oral, Daily at bedtime    venlafaxine (EFFEXOR-XR) 300 mg, Oral, Daily  zolpidem (AMBIEN) 10 mg, Oral, Daily at bedtime PRN       Objective:      /88 (BP Location: Left arm, Patient Position: Sitting, Cuff Size: Adult)   Pulse 91   Temp 98 3 °F (36 8 °C) (Temporal)   Ht 5' 9" (1 753 m)   Wt 102 kg (224 lb)   SpO2 98% Comment: ra  BMI 33 08 kg/m²          Physical Exam  Constitutional:       General: He is not in acute distress  Appearance: Normal appearance  He is not ill-appearing or toxic-appearing  HENT:      Head: Normocephalic and atraumatic  Pulmonary:      Effort: No respiratory distress  Musculoskeletal:         General: Swelling and tenderness (Tenderness and swelling of the great toenail of the right foot ) present  Normal range of motion  Skin:     General: Skin is warm  Neurological:      General: No focal deficit present  Mental Status: He is alert and oriented to person, place, and time     Psychiatric:         Mood and Affect: Mood normal          Behavior: Behavior normal

## 2022-08-25 DIAGNOSIS — Z91.89 AT RISK FOR RESPIRATORY DEPRESSION DUE TO OPIOID: Primary | ICD-10-CM

## 2022-08-25 DIAGNOSIS — G43.109 MIGRAINE WITH AURA AND WITHOUT STATUS MIGRAINOSUS, NOT INTRACTABLE: ICD-10-CM

## 2022-08-25 NOTE — TELEPHONE ENCOUNTER
LS 6/13/22, NA 12/13/22, LF 12/7/21 amt 60/1- pt takes q6h prn --pt filled it on 12/7/21 and 3/15/22,  checked the PDMP    DR Daniella [address]

## 2022-08-26 RX ORDER — TRAMADOL HYDROCHLORIDE 50 MG/1
50 TABLET ORAL EVERY 6 HOURS PRN
Qty: 60 TABLET | Refills: 0 | Status: SHIPPED | OUTPATIENT
Start: 2022-08-26

## 2022-08-26 RX ORDER — NALOXONE HYDROCHLORIDE 4 MG/.1ML
SPRAY NASAL
Qty: 1 EACH | Refills: 1 | Status: SHIPPED | OUTPATIENT
Start: 2022-08-26

## 2022-09-09 DIAGNOSIS — K21.9 GASTROESOPHAGEAL REFLUX DISEASE WITHOUT ESOPHAGITIS: ICD-10-CM

## 2022-09-09 DIAGNOSIS — I10 ESSENTIAL HYPERTENSION: ICD-10-CM

## 2022-09-09 DIAGNOSIS — F41.9 ANXIETY: ICD-10-CM

## 2022-09-09 RX ORDER — PANTOPRAZOLE SODIUM 40 MG/1
40 TABLET, DELAYED RELEASE ORAL DAILY
Qty: 90 TABLET | Refills: 1 | Status: SHIPPED | OUTPATIENT
Start: 2022-09-09

## 2022-09-09 RX ORDER — HYDROCHLOROTHIAZIDE 12.5 MG/1
12.5 TABLET ORAL DAILY
Qty: 90 TABLET | Refills: 1 | Status: SHIPPED | OUTPATIENT
Start: 2022-09-09

## 2022-09-09 RX ORDER — VENLAFAXINE HYDROCHLORIDE 150 MG/1
300 CAPSULE, EXTENDED RELEASE ORAL DAILY
Qty: 180 CAPSULE | Refills: 1 | Status: SHIPPED | OUTPATIENT
Start: 2022-09-09

## 2022-11-16 DIAGNOSIS — F51.01 PRIMARY INSOMNIA: ICD-10-CM

## 2022-11-16 DIAGNOSIS — G43.109 MIGRAINE WITH AURA AND WITHOUT STATUS MIGRAINOSUS, NOT INTRACTABLE: ICD-10-CM

## 2022-11-16 DIAGNOSIS — F41.9 ANXIETY: ICD-10-CM

## 2022-11-17 RX ORDER — ZOLPIDEM TARTRATE 10 MG/1
10 TABLET ORAL
Qty: 30 TABLET | Refills: 3 | Status: SHIPPED | OUTPATIENT
Start: 2022-11-17

## 2022-11-17 RX ORDER — TRAMADOL HYDROCHLORIDE 50 MG/1
50 TABLET ORAL EVERY 6 HOURS PRN
Qty: 60 TABLET | Refills: 0 | Status: SHIPPED | OUTPATIENT
Start: 2022-11-17

## 2022-11-17 RX ORDER — ALPRAZOLAM 0.5 MG/1
0.5 TABLET ORAL DAILY PRN
Qty: 30 TABLET | Refills: 1 | Status: SHIPPED | OUTPATIENT
Start: 2022-11-17

## 2022-12-06 ENCOUNTER — RA CDI HCC (OUTPATIENT)
Dept: OTHER | Facility: HOSPITAL | Age: 47
End: 2022-12-06

## 2022-12-13 ENCOUNTER — OFFICE VISIT (OUTPATIENT)
Dept: INTERNAL MEDICINE CLINIC | Age: 47
End: 2022-12-13

## 2022-12-13 VITALS
WEIGHT: 226 LBS | SYSTOLIC BLOOD PRESSURE: 130 MMHG | OXYGEN SATURATION: 97 % | TEMPERATURE: 97.9 F | BODY MASS INDEX: 33.47 KG/M2 | HEIGHT: 69 IN | DIASTOLIC BLOOD PRESSURE: 76 MMHG | HEART RATE: 91 BPM

## 2022-12-13 DIAGNOSIS — Z23 NEED FOR INFLUENZA VACCINATION: ICD-10-CM

## 2022-12-13 DIAGNOSIS — B35.4 TINEA CORPORIS: ICD-10-CM

## 2022-12-13 DIAGNOSIS — I10 ESSENTIAL HYPERTENSION: ICD-10-CM

## 2022-12-13 DIAGNOSIS — J45.20 MILD INTERMITTENT EXTRINSIC ASTHMA WITHOUT COMPLICATION: Primary | ICD-10-CM

## 2022-12-13 DIAGNOSIS — L85.3 DRY SKIN DERMATITIS: ICD-10-CM

## 2022-12-13 DIAGNOSIS — G43.109 MIGRAINE WITH AURA AND WITHOUT STATUS MIGRAINOSUS, NOT INTRACTABLE: ICD-10-CM

## 2022-12-13 DIAGNOSIS — E55.9 VITAMIN D DEFICIENCY: ICD-10-CM

## 2022-12-13 DIAGNOSIS — K21.9 GASTROESOPHAGEAL REFLUX DISEASE WITHOUT ESOPHAGITIS: ICD-10-CM

## 2022-12-13 DIAGNOSIS — Z13.220 SCREENING CHOLESTEROL LEVEL: ICD-10-CM

## 2022-12-13 DIAGNOSIS — F41.9 ANXIETY: ICD-10-CM

## 2022-12-13 PROBLEM — L60.0 INGROWN TOENAIL: Status: RESOLVED | Noted: 2022-06-16 | Resolved: 2022-12-13

## 2022-12-13 PROBLEM — L81.4 SOLAR LENTIGO: Status: RESOLVED | Noted: 2021-09-24 | Resolved: 2022-12-13

## 2022-12-13 PROBLEM — U07.1 COVID-19 VIRUS INFECTION: Status: RESOLVED | Noted: 2020-11-09 | Resolved: 2022-12-13

## 2022-12-13 RX ORDER — PREDNISONE 20 MG/1
20 TABLET ORAL DAILY
Qty: 5 TABLET | Refills: 0 | Status: SHIPPED | OUTPATIENT
Start: 2022-12-13

## 2022-12-13 RX ORDER — FLUCONAZOLE 200 MG/1
200 TABLET ORAL DAILY
Qty: 5 TABLET | Refills: 1 | Status: SHIPPED | OUTPATIENT
Start: 2022-12-13 | End: 2022-12-18

## 2022-12-13 RX ORDER — PANTOPRAZOLE SODIUM 40 MG/1
40 TABLET, DELAYED RELEASE ORAL DAILY
Qty: 90 TABLET | Refills: 1 | Status: SHIPPED | OUTPATIENT
Start: 2022-12-13

## 2022-12-13 RX ORDER — HYDROCHLOROTHIAZIDE 12.5 MG/1
12.5 TABLET ORAL DAILY
Qty: 90 TABLET | Refills: 1 | Status: SHIPPED | OUTPATIENT
Start: 2022-12-13

## 2022-12-13 RX ORDER — TRAMADOL HYDROCHLORIDE 50 MG/1
50 TABLET ORAL EVERY 6 HOURS PRN
Qty: 60 TABLET | Refills: 1 | Status: SHIPPED | OUTPATIENT
Start: 2022-12-13

## 2022-12-13 RX ORDER — VENLAFAXINE HYDROCHLORIDE 150 MG/1
300 CAPSULE, EXTENDED RELEASE ORAL DAILY
Qty: 180 CAPSULE | Refills: 1 | Status: SHIPPED | OUTPATIENT
Start: 2022-12-13

## 2022-12-13 RX ORDER — ERGOCALCIFEROL 1.25 MG/1
50000 CAPSULE ORAL WEEKLY
Qty: 12 CAPSULE | Refills: 1 | Status: SHIPPED | OUTPATIENT
Start: 2022-12-13

## 2022-12-13 RX ORDER — FLUTICASONE PROPIONATE AND SALMETEROL 250; 50 UG/1; UG/1
1 POWDER RESPIRATORY (INHALATION) 2 TIMES DAILY
Qty: 180 BLISTER | Refills: 1 | Status: SHIPPED | OUTPATIENT
Start: 2022-12-13 | End: 2023-03-13

## 2022-12-13 NOTE — PROGRESS NOTES
Assessment/Plan:    1  Mild intermittent extrinsic asthma without complication  -     Fluticasone-Salmeterol (Advair) 250-50 mcg/dose inhaler; Inhale 1 puff 2 (two) times a day Rinse mouth after use  -     Complete PFT with post bronchodilator; Future  -     predniSONE 20 mg tablet; Take 1 tablet (20 mg total) by mouth daily    2  Essential hypertension  -     hydrochlorothiazide (HYDRODIURIL) 12 5 mg tablet; Take 1 tablet (12 5 mg total) by mouth daily  -     TSH, 3rd generation with Free T4 reflex; Future  -     Comprehensive metabolic panel; Future  -     CBC and differential; Future    3  Anxiety  -     venlafaxine (EFFEXOR-XR) 150 mg 24 hr capsule; Take 2 capsules (300 mg total) by mouth daily  -     TSH, 3rd generation with Free T4 reflex; Future    4  Gastroesophageal reflux disease without esophagitis  -     pantoprazole (PROTONIX) 40 mg tablet; Take 1 tablet (40 mg total) by mouth daily    5  Migraine with aura and without status migrainosus, not intractable  -     traMADol (ULTRAM) 50 mg tablet; Take 1 tablet (50 mg total) by mouth every 6 (six) hours as needed (as needed)    6  Vitamin D deficiency  -     ergocalciferol (VITAMIN D2) 50,000 units; Take 1 capsule (50,000 Units total) by mouth once a week  -     Vitamin D 25 hydroxy; Future    7  Screening cholesterol level  -     Lipid Panel with Direct LDL reflex; Future    8  Dry skin dermatitis  -     predniSONE 20 mg tablet; Take 1 tablet (20 mg total) by mouth daily    9  Tinea corporis  -     fluconazole (DIFLUCAN) 200 mg tablet; Take 1 tablet (200 mg total) by mouth daily for 5 days    10  Need for influenza vaccination  -     FLUZONE: influenza vaccine, quadrivalent, 0 5 mL            There are no Patient Instructions on file for this visit  Return in about 3 months (around 3/13/2023) for Recheck  Subjective:      Patient ID: Aurea Silverio is a 41 y o  male      Chief Complaint   Patient presents with   • Follow-up     6m-f/u b/w results: cough for 3 weeks  Wixela can't be refilled  Patient needs refill  Flu shot   • hm due     CRC: With Dr Donal Parker 2014       HPI    Vitamin-D deficiency, levels have been low persistently however currently it is 14 and he just restarted taking 1000 International Units of vitamin-D daily   Robin Gilliamerly 2021, due for labs  December 2022, patient due for labs  Taking vitamin D     Essential hypertension, on hydrochlorothiazide   Went to the hospital and was prescribed another medication that he did not take  Dulce Menendez has significant cardiac testing at that time due to elevated blood pressure and states that his blood pressures at home have been good  Dulce Menendez feels it is more of an anxiety and agitation issue instead of blood pressure since he has some marital issues  August 2021: Taking HCTZ without issue  No taking pressures at home    December 2021, due for labs  Compliant with medications  December 2022, well controlled with medications  No fluctuations  Does check blood pressure at home from time to time      Generalized Anxiety Disorder (Brief): The patient is being seen for a routine clinic follow-up of anxiety disorder  Symptoms:  no palpitations,-no chest discomfort,-no trouble breathing,-no trembling,-no paresthesias,-no lightheadedness,-no nausea,-no abdominal discomfort,-no excessive sweating,-no hot flashes,-no racing thoughts,-no excessive worrying,-no fear of dying,-no fear of losing control,-no flashbacks,-no repetitive behaviors-and-no sleep disturbance  The patient is currently asymptomatic  Associated symptoms:  no poor concentration,-no memory impairment,-no avoidance of physical exertion,-no irritability,-no agitation,-no hostility-and-no depression symptoms  Suicidal risk:  no suicidal thoughts  Homicidal risk:  no homicidal thoughts  Current treatment includes selective serotonin-norepinephrine reuptake inhibitors and benzodiazepines   There are no medication side effects  (taking xanax a few times per months, uses ambien at night 2-3 times per week  feels well  ) 9/11/18: update: feels that effexor is working well  Donnell Boy not really needed his Xanax jan 2019: Myrna Carrillo been stressful in the marriage at home and due to the stress he decided to have an extramarital affair  Jamie Weeks is now reconciling with his wife in things are better  Jamie Weeks has had more controlled anxiety symptoms and has been out of his Xanax and Ambien but continues to take his Effexor as directed   Prior to his increase in anxiety he was using Xanax very infrequently  July 2019:Has been having some increased marital stress  and going to counseling   Usually his anxiety is very well controlled except when he his wife drinks too much  Na Barajas is in denial and not willing to admit her alcohol intake which upsets the whole family life and harm in but he states children are safe and she has a good mom and they have great family and social support  September 2019  Doing well and still going to counseling  [facility] is not a problem   Effexor was approved and taking medications as described    dec 2019: has been using xanax a little more often at night to help with sleep when he doesn't use the [place]  Mariam Angela stress levels are good and his marital life is back to normal   [address], things have been relatively stable on his diet is well controlled   Except for the corona virus restrictions there is no added stress   He has been working during the pandemic  April 2021, stable on present dosages   No HI or SI no breakthrough symptoms however does have frequent arguments with his wife  Jamie Weeks is looking in to counseling  August 2021: States he has had increased stress at work  He continues to have poor sleep with frequent night time awakenings  He takes his Ambien very rarely, only when absolutely needing it  Used it the other  Having extra stress at work  Taking Effexor    December 2021, doing well  Takes Ambien very rarely    Compliant with medications  December 2022, well controlled with no issues  No HI or SI       Asthma without exacerbation, patient on daily inhaler but ran out few months ago and never called us for follow-up were refills  He feels good when he takes a generic Advair but has been out for few months  Feels that he is having some shortness of breath and difficulty getting air in  His PFT was markedly abnormal   Last allergy testing for environmental allergies was negative  December 2022, recently feeling that his asthma is not as well-controlled as normal   He is out of his inhaler due to some issue but felt that it was helping him  He has dyspnea on exertion and easily fatigability     Migraine headaches, no recent flare up  Has Imitrex which she does not use but does use tramadol at the onset of headaches    December 2022, no headaches since last visit and patient feels well     GERD: Taking Protonix daily "for a while " Denies break though symptoms              The following portions of the patient's history were reviewed and updated as appropriate: allergies, current medications, past family history, past medical history, past social history, past surgical history and problem list     Review of Systems      Constitutional:  Denies fever or chills   Eyes:  Denies double , blurry vision or eye pain  HENT:  Denies nasal congestion or sore throat   Respiratory:  Denies cough or shortness of breath or wheezing  Cardiovascular:  Denies palpitations or chest pain  GI:  Denies abdominal pain, nausea, or vomiting, no loose stools, no reflux  Integument:  Denies rash , no open areas  Neurologic:  Denies headache or focal weakness, no dizziness  : no dysuria, or hematuria      Current Outpatient Medications   Medication Sig Dispense Refill   • ALPRAZolam (XANAX) 0 5 mg tablet Take 1 tablet (0 5 mg total) by mouth daily as needed for anxiety 30 tablet 1   • diclofenac sodium (VOLTAREN) 1 % APPLY 2 GRAMS TO THE AFFECTED AREA(S) BY TOPICAL ROUTE 4 TIMES PER DAY • ergocalciferol (VITAMIN D2) 50,000 units Take 1 capsule (50,000 Units total) by mouth once a week 12 capsule 1   • fluconazole (DIFLUCAN) 200 mg tablet Take 1 tablet (200 mg total) by mouth daily for 5 days 5 tablet 1   • fluticasone (FLONASE) 50 mcg/act nasal spray 1 spray into each nostril daily Please fill 3 with 3 refills for a 90 day supply 18 2 mL 3   • Fluticasone-Salmeterol (Advair) 250-50 mcg/dose inhaler Inhale 1 puff 2 (two) times a day Rinse mouth after use  180 blister 1   • hydrochlorothiazide (HYDRODIURIL) 12 5 mg tablet Take 1 tablet (12 5 mg total) by mouth daily 90 tablet 1   • montelukast (SINGULAIR) 10 mg tablet Take 1 tablet (10 mg total) by mouth daily at bedtime 30 tablet 5   • naloxone (NARCAN) 4 mg/0 1 mL nasal spray Administer 1 spray into a nostril  If no response after 2-3 minutes, give another dose in the other nostril using a new spray  1 each 1   • pantoprazole (PROTONIX) 40 mg tablet Take 1 tablet (40 mg total) by mouth daily 90 tablet 1   • predniSONE 20 mg tablet Take 1 tablet (20 mg total) by mouth daily 5 tablet 0   • SUMAtriptan (IMITREX) 25 mg tablet Take 1 tablet (25 mg total) by mouth as needed for migraine 30 tablet 1   • traMADol (ULTRAM) 50 mg tablet Take 1 tablet (50 mg total) by mouth every 6 (six) hours as needed (as needed) 60 tablet 1   • traZODone (DESYREL) 50 mg tablet Take 1 tablet (50 mg total) by mouth daily at bedtime 30 tablet 5   • venlafaxine (EFFEXOR-XR) 150 mg 24 hr capsule Take 2 capsules (300 mg total) by mouth daily 180 capsule 1   • zolpidem (Ambien) 10 mg tablet Take 1 tablet (10 mg total) by mouth daily at bedtime as needed for sleep 30 tablet 3   • albuterol (PROVENTIL HFA,VENTOLIN HFA) 90 mcg/act inhaler Inhale 2 puffs every 6 (six) hours as needed for wheezing or shortness of breath (Patient not taking: Reported on 12/13/2022) 1 Inhaler 0     No current facility-administered medications for this visit         Objective:    /76 (BP Location: Left arm, Patient Position: Sitting, Cuff Size: Large)   Pulse 91   Temp 97 9 °F (36 6 °C) (Temporal)   Ht 5' 9" (1 753 m)   Wt 103 kg (226 lb)   SpO2 97% Comment: RA  BMI 33 37 kg/m²        Physical Exam         Constitutional:  Well developed, well nourished, no acute distress, non-toxic appearance   Eyes:  PERRL, conjunctiva normal , non icteric sclera  HENT:  Atraumatic, oropharynx moist  Neck-  supple   Respiratory:  CTA b/l, normal breath sounds, no rales, no wheezing   Cardiovascular:  RRR, no murmurs, no LE edema b/l  GI:  Soft, nondistended, normal bowel sounds x 4, nontender, no organomegaly, no mass, no rebound, no guarding   Neurologic:  no focal deficits noted   Psychiatric:  Speech and behavior appropriate , AAO x 3  SkiN; hypo pigmentation on scalp and flaking dry skin on scalp and chest and back       Tyler Burows, DO

## 2023-03-11 ENCOUNTER — APPOINTMENT (OUTPATIENT)
Dept: LAB | Facility: IMAGING CENTER | Age: 48
End: 2023-03-11

## 2023-03-11 DIAGNOSIS — Z13.220 SCREENING CHOLESTEROL LEVEL: ICD-10-CM

## 2023-03-11 DIAGNOSIS — E55.9 VITAMIN D DEFICIENCY: ICD-10-CM

## 2023-03-11 DIAGNOSIS — I10 ESSENTIAL HYPERTENSION: ICD-10-CM

## 2023-03-11 DIAGNOSIS — F41.9 ANXIETY: ICD-10-CM

## 2023-03-11 DIAGNOSIS — R73.09 ABNORMAL GLUCOSE: ICD-10-CM

## 2023-03-11 LAB
25(OH)D3 SERPL-MCNC: 30.5 NG/ML (ref 30–100)
ALBUMIN SERPL BCP-MCNC: 4.3 G/DL (ref 3.5–5)
ALP SERPL-CCNC: 87 U/L (ref 46–116)
ALT SERPL W P-5'-P-CCNC: 69 U/L (ref 12–78)
ANION GAP SERPL CALCULATED.3IONS-SCNC: 2 MMOL/L (ref 4–13)
AST SERPL W P-5'-P-CCNC: 32 U/L (ref 5–45)
BASOPHILS # BLD AUTO: 0.04 THOUSANDS/ÂΜL (ref 0–0.1)
BASOPHILS NFR BLD AUTO: 0 % (ref 0–1)
BILIRUB SERPL-MCNC: 0.41 MG/DL (ref 0.2–1)
BUN SERPL-MCNC: 20 MG/DL (ref 5–25)
CALCIUM SERPL-MCNC: 10 MG/DL (ref 8.3–10.1)
CHLORIDE SERPL-SCNC: 104 MMOL/L (ref 96–108)
CHOLEST SERPL-MCNC: 180 MG/DL
CO2 SERPL-SCNC: 31 MMOL/L (ref 21–32)
CREAT SERPL-MCNC: 1.1 MG/DL (ref 0.6–1.3)
EOSINOPHIL # BLD AUTO: 0 THOUSAND/ÂΜL (ref 0–0.61)
EOSINOPHIL NFR BLD AUTO: 0 % (ref 0–6)
ERYTHROCYTE [DISTWIDTH] IN BLOOD BY AUTOMATED COUNT: 12.2 % (ref 11.6–15.1)
EST. AVERAGE GLUCOSE BLD GHB EST-MCNC: 154 MG/DL
GFR SERPL CREATININE-BSD FRML MDRD: 80 ML/MIN/1.73SQ M
GLUCOSE P FAST SERPL-MCNC: 209 MG/DL (ref 65–99)
HBA1C MFR BLD: 7 %
HCT VFR BLD AUTO: 44.2 % (ref 36.5–49.3)
HDLC SERPL-MCNC: 59 MG/DL
HGB BLD-MCNC: 14.7 G/DL (ref 12–17)
IMM GRANULOCYTES # BLD AUTO: 0.06 THOUSAND/UL (ref 0–0.2)
IMM GRANULOCYTES NFR BLD AUTO: 1 % (ref 0–2)
LDLC SERPL CALC-MCNC: 110 MG/DL (ref 0–100)
LYMPHOCYTES # BLD AUTO: 2.71 THOUSANDS/ÂΜL (ref 0.6–4.47)
LYMPHOCYTES NFR BLD AUTO: 23 % (ref 14–44)
MCH RBC QN AUTO: 30.9 PG (ref 26.8–34.3)
MCHC RBC AUTO-ENTMCNC: 33.3 G/DL (ref 31.4–37.4)
MCV RBC AUTO: 93 FL (ref 82–98)
MONOCYTES # BLD AUTO: 0.74 THOUSAND/ÂΜL (ref 0.17–1.22)
MONOCYTES NFR BLD AUTO: 6 % (ref 4–12)
NEUTROPHILS # BLD AUTO: 8.03 THOUSANDS/ÂΜL (ref 1.85–7.62)
NEUTS SEG NFR BLD AUTO: 70 % (ref 43–75)
NRBC BLD AUTO-RTO: 0 /100 WBCS
PLATELET # BLD AUTO: 419 THOUSANDS/UL (ref 149–390)
PMV BLD AUTO: 10.2 FL (ref 8.9–12.7)
POTASSIUM SERPL-SCNC: 4.3 MMOL/L (ref 3.5–5.3)
PROT SERPL-MCNC: 8.4 G/DL (ref 6.4–8.4)
RBC # BLD AUTO: 4.76 MILLION/UL (ref 3.88–5.62)
SODIUM SERPL-SCNC: 137 MMOL/L (ref 135–147)
T4 FREE SERPL-MCNC: 0.93 NG/DL (ref 0.76–1.46)
TRIGL SERPL-MCNC: 53 MG/DL
TSH SERPL DL<=0.05 MIU/L-ACNC: 0.28 UIU/ML (ref 0.45–4.5)
WBC # BLD AUTO: 11.58 THOUSAND/UL (ref 4.31–10.16)

## 2023-03-13 ENCOUNTER — OFFICE VISIT (OUTPATIENT)
Dept: INTERNAL MEDICINE CLINIC | Facility: OTHER | Age: 48
End: 2023-03-13

## 2023-03-13 VITALS
OXYGEN SATURATION: 98 % | HEIGHT: 69 IN | DIASTOLIC BLOOD PRESSURE: 92 MMHG | SYSTOLIC BLOOD PRESSURE: 138 MMHG | HEART RATE: 94 BPM | BODY MASS INDEX: 33.33 KG/M2 | TEMPERATURE: 98.6 F | WEIGHT: 225 LBS

## 2023-03-13 DIAGNOSIS — R73.01 ABNORMAL FASTING GLUCOSE: ICD-10-CM

## 2023-03-13 DIAGNOSIS — J45.20 MILD INTERMITTENT EXTRINSIC ASTHMA WITHOUT COMPLICATION: Primary | ICD-10-CM

## 2023-03-13 DIAGNOSIS — R06.09 DOE (DYSPNEA ON EXERTION): ICD-10-CM

## 2023-03-13 DIAGNOSIS — F11.20 CONTINUOUS OPIOID DEPENDENCE (HCC): ICD-10-CM

## 2023-03-13 DIAGNOSIS — E55.9 VITAMIN D DEFICIENCY: ICD-10-CM

## 2023-03-13 RX ORDER — FLUTICASONE PROPIONATE AND SALMETEROL 500; 50 UG/1; UG/1
1 POWDER RESPIRATORY (INHALATION) 2 TIMES DAILY
Qty: 60 BLISTER | Refills: 5 | Status: SHIPPED | OUTPATIENT
Start: 2023-03-13 | End: 2023-09-09

## 2023-03-13 RX ORDER — AMOXICILLIN 875 MG/1
875 TABLET, COATED ORAL 2 TIMES DAILY
Qty: 14 TABLET | Refills: 0 | Status: SHIPPED | OUTPATIENT
Start: 2023-03-13 | End: 2023-03-20

## 2023-03-13 RX ORDER — FLUTICASONE PROPIONATE AND SALMETEROL 250; 50 UG/1; UG/1
1 POWDER RESPIRATORY (INHALATION) 2 TIMES DAILY
COMMUNITY
End: 2023-03-13

## 2023-03-13 NOTE — PROGRESS NOTES
Assessment/Plan:    1  Mild intermittent extrinsic asthma without complication  -     amoxicillin (AMOXIL) 875 mg tablet; Take 1 tablet (875 mg total) by mouth 2 (two) times a day for 7 days  -     Fluticasone-Salmeterol (Advair) 500-50 mcg/dose inhaler; Inhale 1 puff 2 (two) times a day Rinse mouth after use  2  Abnormal fasting glucose  -     Hemoglobin A1C; Future  -     Comprehensive metabolic panel; Future    3  Vitamin D deficiency  -     Vitamin D 25 hydroxy; Future    4  BARAKAT (dyspnea on exertion)  -     Echo complete w/ contrast if indicated; Future; Expected date: 03/13/2023  -     CT chest wo contrast; Future; Expected date: 03/13/2023    5  Continuous opioid dependence ([facility] [address] )      BMI Counseling: Body mass index is 33 23 kg/m²  The BMI is above normal  Nutrition recommendations include moderation in carbohydrate intake  Exercise recommendations include exercising 3-5 times per week  No pharmacotherapy was ordered  Rationale for BMI follow-up plan is due to patient being overweight or obese  There are no Patient Instructions on file for this visit  Return in about 4 months (around 7/13/2023) for Recheck  Subjective:      Patient ID: Oni Dutta is a 41 y o  male  Chief Complaint   Patient presents with   • Procedure     PFT EXAM TODAY   Pre formed by Loree Wyman by Dr Jackie Goodrich   • Sinus Problem     Patient currently having sinus issues, coughing with sob           HPI      Vitamin-D deficiency, levels have been low persistently however currently it is 14 and he just restarted taking 1000 International Units of vitamin-D daily   Jessie Nelson 2021, due for labs  December 2022, patient due for labs    Taking vitamin D  March 2023, vitamin D level 30, taking vitamin D supplement but often forgets     Essential hypertension, on hydrochlorothiazide   Went to the hospital and was prescribed another medication that he did not take  [facility] has significant cardiac testing at that time due to elevated blood pressure and states that his blood pressures at home have been good   He feels it is more of an anxiety and agitation issue instead of blood pressure since he has some marital issues  August 2021: Taking HCTZ without issue  No taking pressures at home    December 2021, due for labs   Compliant with medications  December 2022, well controlled with medications  No fluctuations  Does check blood pressure at home from time to time  March 2023, blood pressure in range, no recent issues      Generalized Anxiety Disorder (Brief): The patient is being seen for a routine clinic follow-up of anxiety disorder  Symptoms:  no palpitations,-no chest discomfort,-no trouble breathing,-no trembling,-no paresthesias,-no lightheadedness,-no nausea,-no abdominal discomfort,-no excessive sweating,-no hot flashes,-no racing thoughts,-no excessive worrying,-no fear of dying,-no fear of losing control,-no flashbacks,-no repetitive behaviors-and-no sleep disturbance  The patient is currently asymptomatic  Associated symptoms:  no poor concentration,-no memory impairment,-no avoidance of physical exertion,-no irritability,-no agitation,-no hostility-and-no depression symptoms  Suicidal risk:  no suicidal thoughts  Homicidal risk:  no homicidal thoughts  Current treatment includes selective serotonin-norepinephrine reuptake inhibitors and benzodiazepines  There are no medication side effects  (taking xanax a few times per months, uses ambien at night 2-3 times per week   feels well  ) 9/11/18: update: feels that effexor is working well  Lori Garcia not really needed his Xanax jan 2019: Mary Ann Herlinda been stressful in the marriage at home and due to the stress he decided to have an extramarital affair  Ryanne Aaron is now reconciling with his wife in things are better  Ryanne Aaron has had more controlled anxiety symptoms and has been out of his Xanax and Ambien but continues to take his Effexor as directed   Prior to his increase in anxiety he was using Xanax very infrequently  July 2019:Has been having some increased marital stress  and going to counseling   Usually his anxiety is very well controlled except when he his wife drinks too much  Di Saad is in denial and not willing to admit her alcohol intake which upsets the whole family life and harm in but he states children are safe and she has a good mom and they have great family and social support  September 2019  Doing well and still going to counseling  Blank Joe is not a problem   Effexor was approved and taking medications as described    dec 2019: has been using xanax a little more often at night to help with sleep when he doesn't use the [place]  Cloyce Maidens stress levels are good and his marital life is back to normal   [address], things have been relatively stable on his diet is well controlled   Except for the corona virus restrictions there is no added stress   He has been working during the pandemic  April 2021, stable on present dosages   No HI or SI no breakthrough symptoms however does have frequent arguments with his wife  Yaron Vee is looking in to counseling  August 2021: States he has had increased stress at work  He continues to have poor sleep with frequent night time awakenings  He takes his Ambien very rarely, only when absolutely needing it  Used it the other  Having extra stress at work  Taking Effexor    December 2021, doing well   Takes Ambien very rarely   Compliant with medications  December 2022, well controlled with no issues    No HI or SI  March 2023, stable with Effexor and Xanax, no HI or SI, sleeping well     Asthma without exacerbation, patient on daily inhaler but ran out few months ago and never called us for follow-up were refills  Yaron Vee feels good when he takes a generic Advair but has been out for few months   Feels that he is having some shortness of breath and difficulty getting air in  [facility] PFT was markedly abnormal   Last allergy testing for environmental allergies was negative  December 2022, recently feeling that his asthma is not as well-controlled as normal   He is out of his inhaler due to some issue but felt that it was helping him  He has dyspnea on exertion and easily fatigability  March 2023, patient's Advair was increased from 100-250  His lung function study has improved today from FEV1 60% in 2020 to 84% today  However he still have symptoms of dyspnea on exertion when going up and down the steps or doing his job at times  His wife is here today and states that he is short of breath more often than he should be  Most recently he was on prednisone last week when he went to urgent care and was also on prednisone in December 2022     Migraine headaches, no recent flare up   Has Imitrex which she does not use but does use tramadol at the onset of headaches  December 2022, no headaches since last visit and patient feels well  March 2023, no headaches since last visit     GERD: Taking Protonix daily "for a while " Denies break though symptoms         Abnormal blood sugars, new, patient has been on multiple steroids over the past few years due to asthma    Family history of diabetes      The following portions of the patient's history were reviewed and updated as appropriate: allergies, current medications, past family history, past medical history, past social history, past surgical history and problem list     Review of Systems      Constitutional:  Denies fever or chills   Eyes:  Denies double , blurry vision or eye pain  HENT:  Denies nasal congestion, sore throat or new hearing issues  Respiratory:  Denies cough or shortness of breath or wheezing  Cardiovascular:  Denies palpitations or chest pain  GI:  Denies abdominal pain, nausea, or vomiting, no loose stools, no reflux  Integument:  Denies rash , no open areas  Neurologic:  Denies headache or focal weakness, no dizziness  : no dysuria, or hematuria        Current Outpatient Medications   Medication Sig Dispense Refill   • albuterol (PROVENTIL HFA,VENTOLIN HFA) 90 mcg/act inhaler Inhale 2 puffs every 6 (six) hours as needed for wheezing or shortness of breath 1 Inhaler 0   • ALPRAZolam (XANAX) 0 5 mg tablet Take 1 tablet (0 5 mg total) by mouth daily as needed for anxiety 30 tablet 1   • amoxicillin (AMOXIL) 875 mg tablet Take 1 tablet (875 mg total) by mouth 2 (two) times a day for 7 days 14 tablet 0   • ergocalciferol (VITAMIN D2) 50,000 units Take 1 capsule (50,000 Units total) by mouth once a week 12 capsule 1   • fluticasone (FLONASE) 50 mcg/act nasal spray 1 spray into each nostril daily Please fill 3 with 3 refills for a 90 day supply 18 2 mL 3   • Fluticasone-Salmeterol (Advair) 500-50 mcg/dose inhaler Inhale 1 puff 2 (two) times a day Rinse mouth after use  60 blister 5   • hydrochlorothiazide (HYDRODIURIL) 12 5 mg tablet Take 1 tablet (12 5 mg total) by mouth daily 90 tablet 1   • montelukast (SINGULAIR) 10 mg tablet Take 1 tablet (10 mg total) by mouth daily at bedtime 30 tablet 5   • naloxone (NARCAN) 4 mg/0 1 mL nasal spray Administer 1 spray into a nostril  If no response after 2-3 minutes, give another dose in the other nostril using a new spray  1 each 1   • pantoprazole (PROTONIX) 40 mg tablet Take 1 tablet (40 mg total) by mouth daily 90 tablet 1   • SUMAtriptan (IMITREX) 25 mg tablet Take 1 tablet (25 mg total) by mouth as needed for migraine 30 tablet 1   • traMADol (ULTRAM) 50 mg tablet Take 1 tablet (50 mg total) by mouth every 6 (six) hours as needed (as needed) 60 tablet 1   • traZODone (DESYREL) 50 mg tablet Take 1 tablet (50 mg total) by mouth daily at bedtime 30 tablet 5   • venlafaxine (EFFEXOR-XR) 150 mg 24 hr capsule Take 2 capsules (300 mg total) by mouth daily 180 capsule 1   • zolpidem (Ambien) 10 mg tablet Take 1 tablet (10 mg total) by mouth daily at bedtime as needed for sleep 30 tablet 3     No current facility-administered medications for this visit         Objective:    /92 (BP Location: Left arm, Patient Position: Sitting, Cuff Size: Adult)   Pulse 94   Temp 98 6 °F (37 °C) (Temporal)   Ht 5' 9" (1 753 m)   Wt 102 kg (225 lb)   SpO2 98%   BMI 33 23 kg/m²        Physical Exam       Constitutional:  Well developed, well nourished, no acute distress, non-toxic appearance   Eyes:  PERRL, conjunctiva normal , non icteric sclera  HENT:  Atraumatic, oropharynx moist  Neck-  supple   Respiratory:  CTA b/l, normal breath sounds, no rales, no wheezing   Cardiovascular:  RRR, no murmurs, no LE edema b/l  GI:  Soft, nondistended, normal bowel sounds x 4, nontender, no organomegaly, no mass, no rebound, no guarding   Neurologic:  no focal deficits noted   Psychiatric:  Speech and behavior appropriate , AAO x 3        Janell Foot, DO

## 2023-04-25 DIAGNOSIS — F41.9 ANXIETY: ICD-10-CM

## 2023-04-25 DIAGNOSIS — F51.01 PRIMARY INSOMNIA: ICD-10-CM

## 2023-04-25 DIAGNOSIS — G43.109 MIGRAINE WITH AURA AND WITHOUT STATUS MIGRAINOSUS, NOT INTRACTABLE: ICD-10-CM

## 2023-04-26 RX ORDER — TRAMADOL HYDROCHLORIDE 50 MG/1
50 TABLET ORAL EVERY 6 HOURS PRN
Qty: 60 TABLET | Refills: 0 | Status: SHIPPED | OUTPATIENT
Start: 2023-04-26

## 2023-04-26 RX ORDER — TRAZODONE HYDROCHLORIDE 50 MG/1
50 TABLET ORAL
Qty: 30 TABLET | Refills: 0 | Status: SHIPPED | OUTPATIENT
Start: 2023-04-26

## 2023-04-26 RX ORDER — ZOLPIDEM TARTRATE 10 MG/1
10 TABLET ORAL
Qty: 30 TABLET | Refills: 0 | Status: SHIPPED | OUTPATIENT
Start: 2023-04-26

## 2023-04-26 RX ORDER — ALPRAZOLAM 0.5 MG/1
0.5 TABLET ORAL DAILY PRN
Qty: 30 TABLET | Refills: 0 | Status: SHIPPED | OUTPATIENT
Start: 2023-04-26

## 2023-07-15 ENCOUNTER — APPOINTMENT (OUTPATIENT)
Dept: LAB | Facility: IMAGING CENTER | Age: 48
End: 2023-07-15
Payer: COMMERCIAL

## 2023-07-15 DIAGNOSIS — E55.9 VITAMIN D DEFICIENCY: ICD-10-CM

## 2023-07-15 DIAGNOSIS — R73.01 ABNORMAL FASTING GLUCOSE: ICD-10-CM

## 2023-07-15 LAB
25(OH)D3 SERPL-MCNC: 37.9 NG/ML (ref 30–100)
ALBUMIN SERPL BCP-MCNC: 4.1 G/DL (ref 3.5–5)
ALP SERPL-CCNC: 107 U/L (ref 46–116)
ALT SERPL W P-5'-P-CCNC: 50 U/L (ref 12–78)
ANION GAP SERPL CALCULATED.3IONS-SCNC: 3 MMOL/L
AST SERPL W P-5'-P-CCNC: 27 U/L (ref 5–45)
BILIRUB SERPL-MCNC: 0.68 MG/DL (ref 0.2–1)
BUN SERPL-MCNC: 14 MG/DL (ref 5–25)
CALCIUM SERPL-MCNC: 9.8 MG/DL (ref 8.3–10.1)
CHLORIDE SERPL-SCNC: 106 MMOL/L (ref 96–108)
CO2 SERPL-SCNC: 29 MMOL/L (ref 21–32)
CREAT SERPL-MCNC: 1.1 MG/DL (ref 0.6–1.3)
EST. AVERAGE GLUCOSE BLD GHB EST-MCNC: 126 MG/DL
GFR SERPL CREATININE-BSD FRML MDRD: 80 ML/MIN/1.73SQ M
GLUCOSE P FAST SERPL-MCNC: 113 MG/DL (ref 65–99)
HBA1C MFR BLD: 6 %
POTASSIUM SERPL-SCNC: 4.2 MMOL/L (ref 3.5–5.3)
PROT SERPL-MCNC: 7.7 G/DL (ref 6.4–8.4)
SODIUM SERPL-SCNC: 138 MMOL/L (ref 135–147)

## 2023-07-15 PROCEDURE — 83036 HEMOGLOBIN GLYCOSYLATED A1C: CPT

## 2023-07-15 PROCEDURE — 82306 VITAMIN D 25 HYDROXY: CPT

## 2023-07-15 PROCEDURE — 36415 COLL VENOUS BLD VENIPUNCTURE: CPT

## 2023-07-15 PROCEDURE — 80053 COMPREHEN METABOLIC PANEL: CPT

## 2023-07-17 ENCOUNTER — OFFICE VISIT (OUTPATIENT)
Dept: INTERNAL MEDICINE CLINIC | Facility: OTHER | Age: 48
End: 2023-07-17
Payer: COMMERCIAL

## 2023-07-17 VITALS
BODY MASS INDEX: 30.98 KG/M2 | HEART RATE: 85 BPM | OXYGEN SATURATION: 95 % | SYSTOLIC BLOOD PRESSURE: 148 MMHG | RESPIRATION RATE: 18 BRPM | TEMPERATURE: 98.7 F | DIASTOLIC BLOOD PRESSURE: 84 MMHG | HEIGHT: 69 IN | WEIGHT: 209.2 LBS

## 2023-07-17 DIAGNOSIS — F51.01 PRIMARY INSOMNIA: ICD-10-CM

## 2023-07-17 DIAGNOSIS — J45.20 MILD INTERMITTENT EXTRINSIC ASTHMA WITHOUT COMPLICATION: ICD-10-CM

## 2023-07-17 DIAGNOSIS — F41.1 GENERALIZED ANXIETY DISORDER: ICD-10-CM

## 2023-07-17 DIAGNOSIS — K21.9 GASTROESOPHAGEAL REFLUX DISEASE WITHOUT ESOPHAGITIS: ICD-10-CM

## 2023-07-17 DIAGNOSIS — G47.33 OSA (OBSTRUCTIVE SLEEP APNEA): ICD-10-CM

## 2023-07-17 DIAGNOSIS — E55.9 VITAMIN D DEFICIENCY: ICD-10-CM

## 2023-07-17 DIAGNOSIS — R10.2 PELVIC PAIN IN MALE: ICD-10-CM

## 2023-07-17 DIAGNOSIS — G43.109 MIGRAINE WITH AURA AND WITHOUT STATUS MIGRAINOSUS, NOT INTRACTABLE: ICD-10-CM

## 2023-07-17 DIAGNOSIS — N50.812 PAIN IN LEFT TESTICLE: ICD-10-CM

## 2023-07-17 DIAGNOSIS — I10 ESSENTIAL HYPERTENSION: ICD-10-CM

## 2023-07-17 DIAGNOSIS — R73.01 ABNORMAL FASTING GLUCOSE: ICD-10-CM

## 2023-07-17 DIAGNOSIS — Z12.11 SCREENING FOR COLON CANCER: Primary | ICD-10-CM

## 2023-07-17 DIAGNOSIS — Z13.220 SCREENING CHOLESTEROL LEVEL: ICD-10-CM

## 2023-07-17 PROBLEM — L85.3 DRY SKIN DERMATITIS: Status: RESOLVED | Noted: 2022-12-13 | Resolved: 2023-07-17

## 2023-07-17 PROBLEM — R94.2 OBSTRUCTIVE PATTERN PRESENT ON PULMONARY FUNCTION TESTING: Status: RESOLVED | Noted: 2020-10-15 | Resolved: 2023-07-17

## 2023-07-17 PROBLEM — Z77.22 SECOND HAND SMOKE EXPOSURE: Status: RESOLVED | Noted: 2020-10-15 | Resolved: 2023-07-17

## 2023-07-17 PROCEDURE — 99214 OFFICE O/P EST MOD 30 MIN: CPT | Performed by: FAMILY MEDICINE

## 2023-07-17 RX ORDER — ZOLPIDEM TARTRATE 10 MG/1
10 TABLET ORAL
Qty: 30 TABLET | Refills: 0 | Status: SHIPPED | OUTPATIENT
Start: 2023-07-17

## 2023-07-17 RX ORDER — TRAMADOL HYDROCHLORIDE 50 MG/1
50 TABLET ORAL EVERY 6 HOURS PRN
Qty: 60 TABLET | Refills: 0 | Status: SHIPPED | OUTPATIENT
Start: 2023-07-17

## 2023-07-17 RX ORDER — TRAZODONE HYDROCHLORIDE 50 MG/1
50 TABLET ORAL
Qty: 30 TABLET | Refills: 0 | Status: SHIPPED | OUTPATIENT
Start: 2023-07-17

## 2023-07-17 NOTE — PROGRESS NOTES
Assessment/Plan:    1. Screening for colon cancer  -     Rachel    2. Migraine with aura and without status migrainosus, not intractable  -     traMADol (ULTRAM) 50 mg tablet; Take 1 tablet (50 mg total) by mouth every 6 (six) hours as needed (as needed)    3. Primary insomnia  -     traZODone (DESYREL) 50 mg tablet; Take 1 tablet (50 mg total) by mouth daily at bedtime  -     zolpidem (Ambien) 10 mg tablet; Take 1 tablet (10 mg total) by mouth daily at bedtime as needed for sleep    4. Gastroesophageal reflux disease without esophagitis    5. Generalized anxiety disorder    6. Abnormal fasting glucose  -     Hemoglobin A1C; Future  -     Comprehensive metabolic panel; Future    7. Essential hypertension    8. BHARGAV (obstructive sleep apnea)    9. Vitamin D deficiency    10. Screening cholesterol level  -     Lipid Panel with Direct LDL reflex; Future    11. Pelvic pain in male  -     US groin/inguinal area; Future; Expected date: 07/17/2023    12. Pain in left testicle  -     US scrotum and testicles; Future; Expected date: 07/17/2023    13. Mild intermittent extrinsic asthma without complication            There are no Patient Instructions on file for this visit. Return in about 6 months (around 1/17/2024) for Recheck. Subjective:      Patient ID: Mi Hilton is a 41 y.o. male. Chief Complaint   Patient presents with   • Follow-up     4 month, labs done 7/15/23   •      Rachel, phq, annual   • Abdominal Pain     Lower. Had it in May it went away. It came back about 3 wks ago and has finally subsided.  Could barely move than radiates into his back       HPI     Vitamin-D deficiency, levels have been low persistently however currently it is 14 and he just restarted taking 1000 International Units of vitamin-D daily.   December 2021, due for labs  December 2022, patient due for labs.  Taking vitamin D  March 2023, vitamin D level 30, taking vitamin D supplement but often forgets  July 2023, vitamin D level in range, on supplementation     Essential hypertension, on hydrochlorothiazide.  Went to the hospital and was prescribed another medication that he did not take. Debbie Boyce has significant cardiac testing at that time due to elevated blood pressure and states that his blood pressures at home have been good. Debbie Boyce feels it is more of an anxiety and agitation issue instead of blood pressure since he has some marital issues  August 2021: Taking HCTZ without issue. No taking pressures at home.   December 2021, due for labs.  Compliant with medications  December 2022, well controlled with medications.  No fluctuations.  Does check blood pressure at home from time to time  March 2023, blood pressure in range, no recent issues  July 2023, blood pressure in range, has lost some weight recently due to changes in his diet, he is avoiding carbohydrates and      Generalized Anxiety Disorder (Brief): The patient is being seen for a routine clinic follow-up of anxiety disorder. Symptoms:  no palpitations,-no chest discomfort,-no trouble breathing,-no trembling,-no paresthesias,-no lightheadedness,-no nausea,-no abdominal discomfort,-no excessive sweating,-no hot flashes,-no racing thoughts,-no excessive worrying,-no fear of dying,-no fear of losing control,-no flashbacks,-no repetitive behaviors-and-no sleep disturbance. The patient is currently asymptomatic. Associated symptoms:  no poor concentration,-no memory impairment,-no avoidance of physical exertion,-no irritability,-no agitation,-no hostility-and-no depression symptoms. Suicidal risk:  no suicidal thoughts. Homicidal risk:  no homicidal thoughts. Current treatment includes selective serotonin-norepinephrine reuptake inhibitors and benzodiazepines. There are no medication side effects. (taking xanax a few times per months, uses ambien at night 2-3 times per week.  feels well. ) 9/11/18: update: feels that effexor is working well. Moody Ibrahim not really needed his Xanax [place] 2019:  Has been stressful in the marriage at home and due to the stress he decided to have an extramarital affair. Bel Cardoso is now reconciling with his wife in things are better. Bel Cardoso has had more controlled anxiety symptoms and has been out of his Xanax and Ambien but continues to take his Effexor as directed.  Prior to his increase in anxiety he was using Xanax very infrequently. July 2019:Has been having some increased marital stress  and going to counseling.  Usually his anxiety is very well controlled except when he his wife drinks too much. Ronda Cancer is in denial and not willing to admit her alcohol intake which upsets the whole family life and harm in but he states children are safe and she has a good mom and they have great family and social support  September 2019. Doing well and still going to counseling. Wyn Leeanne is not a problem.  Effexor was approved and taking medications as described.   dec 2019: has been using xanax a little more often at night to help with sleep when he doesn't use the [place]. Marcus Major stress levels are good and his marital life is back to normal.  [address], things have been relatively stable on his diet is well controlled.  Except for the corona virus restrictions there is no added stress.  He has been working during the pandemic  April 2021, stable on present dosages.  No HI or SI no breakthrough symptoms however does have frequent arguments with his wife. Bel Cardoso is looking in to counseling  August 2021: States he has had increased stress at work. He continues to have poor sleep with frequent night time awakenings. He takes his Ambien very rarely, only when absolutely needing it. Used it the other. Having extra stress at work.  Taking Effexor    December 2021, doing well.  Takes Ambien very rarely.  Compliant with medications  December 2022, well controlled with no issues.  No HI or SI  March 2023, stable with Effexor and Xanax, no HI or SI, sleeping well  July 2023, no HI or SI, doing well with 300 mg of Effexor, rarely uses Xanax, does not need a dose change     Asthma without exacerbation, patient on daily inhaler but ran out few months ago and never called us for follow-up were refills. Mabel Thomas feels good when he takes a generic Advair but has been out for few months.  Feels that he is having some shortness of breath and difficulty getting air in. [facility] PFT was markedly abnormal.  Last allergy testing for environmental allergies was negative  December 2022, recently feeling that his asthma is not as well-controlled as normal. Mabel Thomas is out of his inhaler due to some issue but felt that it was helping him. Mabel Thomas has dyspnea on exertion and easily fatigability  March 2023, patient's Advair was increased from 100-250. His lung function study has improved today from FEV1 60% in 2020 to 84% today. However he still have symptoms of dyspnea on exertion when going up and down the steps or doing his job at times. His wife is here today and states that he is short of breath more often than he should be. Most recently he was on prednisone last week when he went to urgent care and was also on prednisone in December 2022 July 2023, doing very well, no exacerbations of asthma since last visit and compliant with inhaler,     Migraine headaches, no recent flare up.  Has Imitrex which she does not use but does use tramadol at the onset of headaches. December 2022, no headaches since last visit and patient feels well  March 2023, no headaches since last visit  July 2023, no new headaches since last visit     GERD: Taking Protonix daily "for a while." Denies break though symptoms.         Abnormal blood sugars, new, patient has been on multiple steroids over the past few years due to asthma. Family history of diabetes  July 2023, significant improvement in blood sugars with weight loss and reducing carbohydrate intake.     Patient complaining of left-sided groin pain and left-sided testicular pain, denies any trauma or falling or recent exercises or heavy lifting. Has had hernia in the past which she was difficult to find and thinks it may be the same pain, has some left-sided testicular tenderness with no edema or rashes          The following portions of the patient's history were reviewed and updated as appropriate: allergies, current medications, past family history, past medical history, past social history, past surgical history and problem list.    Review of Systems      Constitutional:  Denies fever or chills , intentional weight loss  Eyes:  Denies double , blurry vision or eye pain  HENT:  Denies nasal congestion, sore throat or new hearing issues  Respiratory:  Denies cough or shortness of breath or wheezing  Cardiovascular:  Denies palpitations or chest pain  GI:  Denies abdominal pain, nausea, or vomiting, no loose stools, no reflux  Integument:  Denies rash , no open areas  Neurologic:  Denies headache or focal weakness, no dizziness  : no dysuria, or hematuria      Current Outpatient Medications   Medication Sig Dispense Refill   • albuterol (PROVENTIL HFA,VENTOLIN HFA) 90 mcg/act inhaler Inhale 2 puffs every 6 (six) hours as needed for wheezing or shortness of breath 1 Inhaler 0   • ALPRAZolam (XANAX) 0.5 mg tablet Take 1 tablet (0.5 mg total) by mouth daily as needed for anxiety 30 tablet 0   • ergocalciferol (VITAMIN D2) 50,000 units Take 1 capsule (50,000 Units total) by mouth once a week 12 capsule 1   • fluticasone (FLONASE) 50 mcg/act nasal spray 1 spray into each nostril daily Please fill 3 with 3 refills for a 90 day supply 18.2 mL 3   • Fluticasone-Salmeterol (Advair) 500-50 mcg/dose inhaler Inhale 1 puff 2 (two) times a day Rinse mouth after use.  60 blister 5   • hydrochlorothiazide (HYDRODIURIL) 12.5 mg tablet Take 1 tablet (12.5 mg total) by mouth daily 90 tablet 1   • montelukast (SINGULAIR) 10 mg tablet Take 1 tablet (10 mg total) by mouth daily at bedtime 30 tablet 5   • naloxone (NARCAN) 4 mg/0.1 mL nasal spray Administer 1 spray into a nostril. If no response after 2-3 minutes, give another dose in the other nostril using a new spray. 1 each 1   • pantoprazole (PROTONIX) 40 mg tablet Take 1 tablet (40 mg total) by mouth daily 90 tablet 1   • SUMAtriptan (IMITREX) 25 mg tablet Take 1 tablet (25 mg total) by mouth as needed for migraine 30 tablet 1   • traMADol (ULTRAM) 50 mg tablet Take 1 tablet (50 mg total) by mouth every 6 (six) hours as needed (as needed) 60 tablet 0   • traZODone (DESYREL) 50 mg tablet Take 1 tablet (50 mg total) by mouth daily at bedtime 30 tablet 0   • venlafaxine (EFFEXOR-XR) 150 mg 24 hr capsule Take 2 capsules (300 mg total) by mouth daily 180 capsule 1   • zolpidem (Ambien) 10 mg tablet Take 1 tablet (10 mg total) by mouth daily at bedtime as needed for sleep 30 tablet 0     No current facility-administered medications for this visit.        Objective:    /84 (BP Location: Left arm, Patient Position: Sitting, Cuff Size: Large)   Pulse 85   Temp 98.7 °F (37.1 °C) (Temporal)   Resp 18   Ht 5' 9" (1.753 m)   Wt 94.9 kg (209 lb 3.2 oz)   SpO2 95%   BMI 30.89 kg/m²        Physical Exam      Constitutional:  Well developed, well nourished, no acute distress, non-toxic appearance   Eyes:  PERRL, conjunctiva normal , non icteric sclera  HENT:  Atraumatic, oropharynx moist. Neck-  supple   Respiratory:  CTA b/l, normal breath sounds, no rales, no wheezing   Cardiovascular:  RRR, no murmurs, no LE edema b/l  GI:  Soft, nondistended, normal bowel sounds x 4, nontender, no organomegaly, no mass, no rebound, no guarding   Neurologic:  no focal deficits noted   Psychiatric:  Speech and behavior appropriate , AAO x 3           Kamar Loyola DO

## 2023-08-08 ENCOUNTER — OFFICE VISIT (OUTPATIENT)
Dept: INTERNAL MEDICINE CLINIC | Facility: CLINIC | Age: 48
End: 2023-08-08
Payer: COMMERCIAL

## 2023-08-08 VITALS
DIASTOLIC BLOOD PRESSURE: 79 MMHG | TEMPERATURE: 98.2 F | HEART RATE: 72 BPM | WEIGHT: 211 LBS | HEIGHT: 69 IN | OXYGEN SATURATION: 96 % | SYSTOLIC BLOOD PRESSURE: 120 MMHG | BODY MASS INDEX: 31.25 KG/M2

## 2023-08-08 DIAGNOSIS — H10.021 PINK EYE DISEASE OF RIGHT EYE: Primary | ICD-10-CM

## 2023-08-08 DIAGNOSIS — J45.20 MILD INTERMITTENT EXTRINSIC ASTHMA WITHOUT COMPLICATION: ICD-10-CM

## 2023-08-08 PROCEDURE — 99213 OFFICE O/P EST LOW 20 MIN: CPT | Performed by: INTERNAL MEDICINE

## 2023-08-08 RX ORDER — MONTELUKAST SODIUM 10 MG/1
10 TABLET ORAL
Qty: 30 TABLET | Refills: 0 | Status: SHIPPED | OUTPATIENT
Start: 2023-08-08

## 2023-08-08 RX ORDER — ERYTHROMYCIN 5 MG/G
0.5 OINTMENT OPHTHALMIC EVERY 8 HOURS SCHEDULED
Qty: 3.5 G | Refills: 0 | Status: SHIPPED | OUTPATIENT
Start: 2023-08-08

## 2023-08-08 NOTE — PROGRESS NOTES
Assessment/Plan:    Diagnoses and all orders for this visit:    Pink eye disease of right eye  -     erythromycin (ILOTYCIN) ophthalmic ointment; Administer 0.5 inches to the right eye every 8 (eight) hours    Mild intermittent extrinsic asthma without complication  -     montelukast (SINGULAIR) 10 mg tablet; Take 1 tablet (10 mg total) by mouth daily at bedtime              There are no Patient Instructions on file for this visit. Subjective:      Patient ID: Deshawn Sweet is a 41 y.o. male    Complains of redness, excessive tearing and light sensitivity on his right eye since yesterday. Current Outpatient Medications:   •  albuterol (PROVENTIL HFA,VENTOLIN HFA) 90 mcg/act inhaler, Inhale 2 puffs every 6 (six) hours as needed for wheezing or shortness of breath, Disp: 1 Inhaler, Rfl: 0  •  ALPRAZolam (XANAX) 0.5 mg tablet, Take 1 tablet (0.5 mg total) by mouth daily as needed for anxiety, Disp: 30 tablet, Rfl: 0  •  ergocalciferol (VITAMIN D2) 50,000 units, Take 1 capsule (50,000 Units total) by mouth once a week, Disp: 12 capsule, Rfl: 1  •  erythromycin (ILOTYCIN) ophthalmic ointment, Administer 0.5 inches to the right eye every 8 (eight) hours, Disp: 3.5 g, Rfl: 0  •  fluticasone (FLONASE) 50 mcg/act nasal spray, 1 spray into each nostril daily Please fill 3 with 3 refills for a 90 day supply, Disp: 18.2 mL, Rfl: 3  •  Fluticasone-Salmeterol (Advair) 500-50 mcg/dose inhaler, Inhale 1 puff 2 (two) times a day Rinse mouth after use., Disp: 60 blister, Rfl: 5  •  hydrochlorothiazide (HYDRODIURIL) 12.5 mg tablet, Take 1 tablet (12.5 mg total) by mouth daily, Disp: 90 tablet, Rfl: 1  •  montelukast (SINGULAIR) 10 mg tablet, Take 1 tablet (10 mg total) by mouth daily at bedtime, Disp: 30 tablet, Rfl: 0  •  naloxone (NARCAN) 4 mg/0.1 mL nasal spray, Administer 1 spray into a nostril.  If no response after 2-3 minutes, give another dose in the other nostril using a new spray., Disp: 1 each, Rfl: 1  • pantoprazole (PROTONIX) 40 mg tablet, Take 1 tablet (40 mg total) by mouth daily, Disp: 90 tablet, Rfl: 1  •  SUMAtriptan (IMITREX) 25 mg tablet, Take 1 tablet (25 mg total) by mouth as needed for migraine, Disp: 30 tablet, Rfl: 1  •  traMADol (ULTRAM) 50 mg tablet, Take 1 tablet (50 mg total) by mouth every 6 (six) hours as needed (as needed), Disp: 60 tablet, Rfl: 0  •  traZODone (DESYREL) 50 mg tablet, Take 1 tablet (50 mg total) by mouth daily at bedtime, Disp: 30 tablet, Rfl: 0  •  venlafaxine (EFFEXOR-XR) 150 mg 24 hr capsule, Take 2 capsules (300 mg total) by mouth daily, Disp: 180 capsule, Rfl: 1  •  zolpidem (Ambien) 10 mg tablet, Take 1 tablet (10 mg total) by mouth daily at bedtime as needed for sleep, Disp: 30 tablet, Rfl: 0     Past Medical History:   Diagnosis Date   • Accelerated essential hypertension    • Anxiety    • Arrhythmia 11/7/2016   • Colitis    • COVID-19 10/2020   • COVID-19 virus infection 11/9/2020   • Dry skin dermatitis 12/13/2022   • DVT (deep venous thrombosis) ([address]) 2015   • Elevated ALT measurement    • GERD (gastroesophageal reflux disease)    • Gynecomastia 7/16/2019   • Headache    • Holter monitor, abnormal    • Ingrown toenail 6/16/2022   • Left leg DVT ([address]) 10/8/2016   • Migraine    • Obstructive pattern present on pulmonary function testing 10/15/2020   • PE (pulmonary thromboembolism) ([address]) 2015   • Pulmonary embolism ([address]) 2/23/2017   • Second hand smoke exposure 10/15/2020   • Sleep apnea     mild   • Snoring 11/4/2016   • Solar lentigo 9/24/2021   • Status post right breast biopsy 9/4/2019   • Syncope    • Tobacco dependence due to chewing tobacco 7/2/2019         Past Surgical History:   Procedure Laterality Date   • BREAST LUMPECTOMY Right    • HERNIA REPAIR Right     groin   • KNEE ARTHROSCOPY Left     x2   • KNEE SURGERY  10/2015   • [place] BX BREAST NEEDLE CORE W/O IMAGING GUIDANCE SPX Right 8/22/2019    Procedure: EXCISIONAL BIOPSY RIGHT BREAST MASS;  Surgeon: Yuridia Cross Ed Mccoy MD;  Location: [facility];  Service: General   • SHOULDER SURGERY Left     3x   • VASECTOMY     • WISDOM TOOTH EXTRACTION           Allergies   Allergen Reactions   • Lidocaine Anaphylaxis     Patch       Recent Results (from the past 1008 hour(s))   Hemoglobin A1C    Collection Time: 07/15/23  8:14 AM   Result Value Ref Range    Hemoglobin A1C 6.0 (H) Normal 3.8-5.6%; PreDiabetic 5.7-6.4%; Diabetic >=6.5%; Glycemic control for adults with diabetes <7.0% %     mg/dl   Comprehensive metabolic panel    Collection Time: 07/15/23  8:14 AM   Result Value Ref Range    Sodium 138 135 - 147 mmol/L    Potassium 4.2 3.5 - 5.3 mmol/L    Chloride 106 96 - 108 mmol/L    CO2 29 21 - 32 mmol/L    ANION GAP 3 mmol/L    BUN 14 5 - 25 mg/dL    Creatinine 1.10 0.60 - 1.30 mg/dL    Glucose, Fasting 113 (H) 65 - 99 mg/dL    Calcium 9.8 8.3 - 10.1 mg/dL    AST 27 5 - 45 U/L    ALT 50 12 - 78 U/L    Alkaline Phosphatase 107 46 - 116 U/L    Total Protein 7.7 6.4 - 8.4 g/dL    Albumin 4.1 3.5 - 5.0 g/dL    Total Bilirubin 0.68 0.20 - 1.00 mg/dL    eGFR 80 ml/min/1.73sq m   Vitamin D 25 hydroxy    Collection Time: 07/15/23  8:14 AM   Result Value Ref Range    Vit D, 25-Hydroxy 37.9 30.0 - 100.0 ng/mL       The following portions of the patient's history were reviewed and updated as appropriate: allergies, current medications, past family history, past medical history, past social history, past surgical history and problem list.     Review of Systems   Constitutional: Negative for activity change, appetite change, chills, diaphoresis, fatigue, fever and unexpected weight change. HENT: Negative for congestion and sore throat. Eyes: Positive for photophobia, discharge and redness. Negative for pain, itching and visual disturbance. Respiratory: Negative for apnea, cough, choking, chest tightness, shortness of breath, wheezing and stridor. Cardiovascular: Negative for chest pain, palpitations and leg swelling. Gastrointestinal: Negative for abdominal distention, abdominal pain, blood in stool, constipation, nausea and rectal pain. Genitourinary: Negative for dysuria, flank pain, frequency and urgency. Musculoskeletal: Negative for arthralgias, back pain, gait problem, joint swelling and myalgias. Skin: Negative for color change, pallor and rash. Neurological: Negative for headaches. Objective:      Vitals:    08/08/23 0940   BP: 120/79   Pulse: 72   Temp: 98.2 °F (36.8 °C)   SpO2: 96%          Physical Exam  Vitals reviewed. Constitutional:       General: He is not in acute distress. Appearance: Normal appearance. He is not ill-appearing, toxic-appearing or diaphoretic. HENT:      Mouth/Throat:      Mouth: Mucous membranes are moist.   Eyes:      General: Lids are normal.         Right eye: Discharge present. Left eye: No discharge. Conjunctiva/sclera:      Right eye: Right conjunctiva is injected. No chemosis, exudate or hemorrhage. Left eye: Left conjunctiva is not injected. No chemosis, exudate or hemorrhage. Cardiovascular:      Rate and Rhythm: Normal rate and regular rhythm. Pulses: Normal pulses. Heart sounds: Normal heart sounds. No murmur heard. No friction rub. No gallop. Pulmonary:      Effort: Pulmonary effort is normal. No respiratory distress. Breath sounds: Normal breath sounds. No stridor. No wheezing, rhonchi or rales. Chest:      Chest wall: No tenderness. Musculoskeletal:      Right lower leg: No edema. Left lower leg: No edema. Skin:     General: Skin is warm and dry. Findings: No lesion or rash. Neurological:      Mental Status: He is alert.

## 2023-08-09 DIAGNOSIS — F51.01 PRIMARY INSOMNIA: ICD-10-CM

## 2023-08-09 RX ORDER — TRAZODONE HYDROCHLORIDE 50 MG/1
50 TABLET ORAL
Qty: 90 TABLET | Refills: 1 | OUTPATIENT
Start: 2023-08-09

## 2023-08-14 ENCOUNTER — OFFICE VISIT (OUTPATIENT)
Dept: URGENT CARE | Age: 48
End: 2023-08-14
Payer: COMMERCIAL

## 2023-08-14 VITALS
TEMPERATURE: 97.2 F | DIASTOLIC BLOOD PRESSURE: 86 MMHG | OXYGEN SATURATION: 96 % | HEIGHT: 68 IN | SYSTOLIC BLOOD PRESSURE: 134 MMHG | RESPIRATION RATE: 18 BRPM | BODY MASS INDEX: 32.13 KG/M2 | HEART RATE: 92 BPM | WEIGHT: 212 LBS

## 2023-08-14 DIAGNOSIS — H16.001 CORNEAL ULCER OF RIGHT EYE: Primary | ICD-10-CM

## 2023-08-14 PROCEDURE — 99213 OFFICE O/P EST LOW 20 MIN: CPT

## 2023-08-14 RX ORDER — OFLOXACIN 3 MG/ML
1 SOLUTION/ DROPS OPHTHALMIC 4 TIMES DAILY
Qty: 5 ML | Refills: 0 | Status: SHIPPED | OUTPATIENT
Start: 2023-08-14

## 2023-08-14 NOTE — PROGRESS NOTES
[facility]        NAME: Aquilino Patel is a 41 y.o. male  : 1975    MRN: [id number]  DATE: 2023  TIME: 5:47 PM    Assessment and Plan   Corneal ulcer of right eye [H16.001]  1. Corneal ulcer of right eye  ofloxacin (OCUFLOX) 0.3 % ophthalmic solution        Pt presents for eval of ongoing right eye discomfort. States on  was seen at another facility and told it was pink eye. Has been using ointment as prescribed. Pt notes eye irritation started after his dog " licked his eyeball". Feels like something is in there. Eye exam notes no redness /drainage. Corneal ulcer noted to right lateral eye. Discussed dc ointment , switching to abx gtts. Will f/u with optha. Has established one in place. Patient Instructions       Follow up with PCP as needed    Chief Complaint     Chief Complaint   Patient presents with   • Eye Problem     Seen last  and was determined to be pink eye. Pt was given ointment for it, but pt says eye is not getting any better. Eye is painful and pt believes there may be something stuck in eye. History of Present Illness       Pt presents for eval of ongoing right eye discomfort. States on  was seen at another facility and told it was pink eye. Has been using ointment as prescribed. Pt notes eye irritation started after his dog " licked his eyeball". Feels like something is in there. Eye exam notes no redness /drainage. Corneal ulcer noted to right lateral eye. Discussed dc ointment , switching to abx gtts. Will f/u with optha. Has established one in place. Review of Systems   Review of Systems   Constitutional: Negative for activity change. Eyes: Positive for pain, discharge and redness. Negative for photophobia and visual disturbance. All other systems reviewed and are negative.         Current Medications       Current Outpatient Medications:   •  albuterol (PROVENTIL HFA,VENTOLIN HFA) 90 mcg/act inhaler, Inhale 2 puffs every 6 (six) hours as needed for wheezing or shortness of breath, Disp: 1 Inhaler, Rfl: 0  •  ALPRAZolam (XANAX) 0.5 mg tablet, Take 1 tablet (0.5 mg total) by mouth daily as needed for anxiety, Disp: 30 tablet, Rfl: 0  •  ergocalciferol (VITAMIN D2) 50,000 units, Take 1 capsule (50,000 Units total) by mouth once a week, Disp: 12 capsule, Rfl: 1  •  erythromycin (ILOTYCIN) ophthalmic ointment, Administer 0.5 inches to the right eye every 8 (eight) hours, Disp: 3.5 g, Rfl: 0  •  fluticasone (FLONASE) 50 mcg/act nasal spray, 1 spray into each nostril daily Please fill 3 with 3 refills for a 90 day supply, Disp: 18.2 mL, Rfl: 3  •  Fluticasone-Salmeterol (Advair) 500-50 mcg/dose inhaler, Inhale 1 puff 2 (two) times a day Rinse mouth after use., Disp: 60 blister, Rfl: 5  •  hydrochlorothiazide (HYDRODIURIL) 12.5 mg tablet, Take 1 tablet (12.5 mg total) by mouth daily, Disp: 90 tablet, Rfl: 1  •  montelukast (SINGULAIR) 10 mg tablet, Take 1 tablet (10 mg total) by mouth daily at bedtime, Disp: 30 tablet, Rfl: 0  •  naloxone (NARCAN) 4 mg/0.1 mL nasal spray, Administer 1 spray into a nostril. If no response after 2-3 minutes, give another dose in the other nostril using a new spray. (Patient taking differently: if needed Administer 1 spray into a nostril.  If no response after 2-3 minutes, give another dose in the other nostril using a new spray.), Disp: 1 each, Rfl: 1  •  ofloxacin (OCUFLOX) 0.3 % ophthalmic solution, Administer 1 drop to the right eye 4 (four) times a day, Disp: 5 mL, Rfl: 0  •  pantoprazole (PROTONIX) 40 mg tablet, Take 1 tablet (40 mg total) by mouth daily, Disp: 90 tablet, Rfl: 1  •  SUMAtriptan (IMITREX) 25 mg tablet, Take 1 tablet (25 mg total) by mouth as needed for migraine, Disp: 30 tablet, Rfl: 1  •  traMADol (ULTRAM) 50 mg tablet, Take 1 tablet (50 mg total) by mouth every 6 (six) hours as needed (as needed), Disp: 60 tablet, Rfl: 0  •  traZODone (DESYREL) 50 mg tablet, Take 1 tablet (50 mg total) by mouth daily at bedtime, Disp: 30 tablet, Rfl: 0  •  venlafaxine (EFFEXOR-XR) 150 mg 24 hr capsule, Take 2 capsules (300 mg total) by mouth daily, Disp: 180 capsule, Rfl: 1  •  zolpidem (Ambien) 10 mg tablet, Take 1 tablet (10 mg total) by mouth daily at bedtime as needed for sleep, Disp: 30 tablet, Rfl: 0    Current Allergies     Allergies as of 08/14/2023 - Reviewed 08/14/2023   Allergen Reaction Noted   • Lidocaine Anaphylaxis 09/09/2016            The following portions of the patient's history were reviewed and updated as appropriate: allergies, current medications, past family history, past medical history, past social history, past surgical history and problem list.     Past Medical History:   Diagnosis Date   • Accelerated essential hypertension    • Anxiety    • Arrhythmia 11/7/2016   • Colitis    • COVID-19 10/2020   • COVID-19 virus infection 11/9/2020   • Dry skin dermatitis 12/13/2022   • DVT (deep venous thrombosis) ([address]) 2015   • Elevated ALT measurement    • GERD (gastroesophageal reflux disease)    • Gynecomastia 7/16/2019   • Headache    • Holter monitor, abnormal    • Ingrown toenail 6/16/2022   • Left leg DVT ([address]) 10/8/2016   • Migraine    • Obstructive pattern present on pulmonary function testing 10/15/2020   • PE (pulmonary thromboembolism) ([address]) 2015   • Pulmonary embolism ([address]) 2/23/2017   • Second hand smoke exposure 10/15/2020   • Sleep apnea     mild   • Snoring 11/4/2016   • Solar lentigo 9/24/2021   • Status post right breast biopsy 9/4/2019   • Syncope    • Tobacco dependence due to chewing tobacco 7/2/2019       Past Surgical History:   Procedure Laterality Date   • BREAST LUMPECTOMY Right    • HERNIA REPAIR Right     groin   • KNEE ARTHROSCOPY Left     x2   • KNEE SURGERY  10/2015   • [place] BX BREAST NEEDLE CORE W/O IMAGING GUIDANCE SPX Right 8/22/2019    Procedure: EXCISIONAL BIOPSY RIGHT BREAST MASS;  Surgeon: Dario Peterson MD;  Location: [facility];  Service: General   • SHOULDER SURGERY Left     3x   • VASECTOMY     • WISDOM TOOTH EXTRACTION         Family History   Problem Relation Age of Onset   • Migraines Mother    • Diabetes Mother    • Hypertension Father    • Diabetes Father    • Cancer Sister    • Thyroid cancer Sister 28   • Cancer Brother    • Hypertension Brother    • Liver disease Brother         fatty liver   • Cancer Maternal Grandfather    • Other Maternal Grandfather         history of travel   • Hypertension Paternal Grandmother    • Diabetes Paternal Grandmother    • Hypertension Paternal Grandfather    • Cancer Other    • Cancer Maternal Aunt          Medications have been verified. Objective   /86   Pulse 92   Temp (!) 97.2 °F (36.2 °C)   Resp 18   Ht 5' 8" (1.727 m)   Wt 96.2 kg (212 lb)   SpO2 96%   BMI 32.23 kg/m²   No LMP for male patient. Physical Exam     Physical Exam  Vitals reviewed. Constitutional:       Appearance: Normal appearance. Eyes:      Pupils: Pupils are equal, round, and reactive to light. Right eye: Fluorescein uptake present. Slit lamp exam:     Right eye: Corneal ulcer present. No foreign body. Neurological:      Mental Status: He is alert.

## 2023-09-05 DIAGNOSIS — F41.9 ANXIETY: ICD-10-CM

## 2023-09-05 DIAGNOSIS — F51.01 PRIMARY INSOMNIA: ICD-10-CM

## 2023-09-05 DIAGNOSIS — J45.20 MILD INTERMITTENT EXTRINSIC ASTHMA WITHOUT COMPLICATION: ICD-10-CM

## 2023-09-05 DIAGNOSIS — G43.109 MIGRAINE WITH AURA AND WITHOUT STATUS MIGRAINOSUS, NOT INTRACTABLE: ICD-10-CM

## 2023-09-05 DIAGNOSIS — I10 ESSENTIAL HYPERTENSION: ICD-10-CM

## 2023-09-05 DIAGNOSIS — K21.9 GASTROESOPHAGEAL REFLUX DISEASE WITHOUT ESOPHAGITIS: ICD-10-CM

## 2023-09-05 RX ORDER — TRAMADOL HYDROCHLORIDE 50 MG/1
50 TABLET ORAL EVERY 6 HOURS PRN
Qty: 60 TABLET | Refills: 0 | Status: SHIPPED | OUTPATIENT
Start: 2023-09-05

## 2023-09-05 RX ORDER — MONTELUKAST SODIUM 10 MG/1
10 TABLET ORAL
Qty: 30 TABLET | Refills: 0 | Status: CANCELLED | OUTPATIENT
Start: 2023-09-05

## 2023-09-05 RX ORDER — VENLAFAXINE HYDROCHLORIDE 150 MG/1
300 CAPSULE, EXTENDED RELEASE ORAL DAILY
Qty: 180 CAPSULE | Refills: 0 | Status: SHIPPED | OUTPATIENT
Start: 2023-09-05

## 2023-09-05 RX ORDER — PANTOPRAZOLE SODIUM 40 MG/1
40 TABLET, DELAYED RELEASE ORAL DAILY
Qty: 90 TABLET | Refills: 0 | Status: SHIPPED | OUTPATIENT
Start: 2023-09-05

## 2023-09-05 RX ORDER — HYDROCHLOROTHIAZIDE 12.5 MG/1
12.5 TABLET ORAL DAILY
Qty: 90 TABLET | Refills: 0 | Status: SHIPPED | OUTPATIENT
Start: 2023-09-05

## 2023-09-05 RX ORDER — TRAZODONE HYDROCHLORIDE 50 MG/1
50 TABLET ORAL
Qty: 30 TABLET | Refills: 0 | Status: SHIPPED | OUTPATIENT
Start: 2023-09-05

## 2023-09-05 RX ORDER — ALPRAZOLAM 0.5 MG/1
0.5 TABLET ORAL DAILY PRN
Qty: 30 TABLET | Refills: 0 | Status: SHIPPED | OUTPATIENT
Start: 2023-09-05

## 2023-09-05 RX ORDER — MONTELUKAST SODIUM 10 MG/1
10 TABLET ORAL
Qty: 90 TABLET | Refills: 0 | Status: SHIPPED | OUTPATIENT
Start: 2023-09-05

## 2023-09-05 RX ORDER — TRAZODONE HYDROCHLORIDE 50 MG/1
50 TABLET ORAL
Qty: 30 TABLET | Refills: 0 | Status: CANCELLED | OUTPATIENT
Start: 2023-09-05

## 2023-10-18 DIAGNOSIS — R09.82 POST-NASAL DRIP: ICD-10-CM

## 2023-10-18 DIAGNOSIS — F51.01 PRIMARY INSOMNIA: ICD-10-CM

## 2023-10-19 RX ORDER — FLUTICASONE PROPIONATE 50 MCG
1 SPRAY, SUSPENSION (ML) NASAL DAILY
Qty: 18.2 ML | Refills: 2 | Status: SHIPPED | OUTPATIENT
Start: 2023-10-19

## 2023-10-19 RX ORDER — TRAZODONE HYDROCHLORIDE 50 MG/1
50 TABLET ORAL
Qty: 30 TABLET | Refills: 0 | Status: SHIPPED | OUTPATIENT
Start: 2023-10-19

## 2023-11-07 DIAGNOSIS — G43.109 MIGRAINE WITH AURA AND WITHOUT STATUS MIGRAINOSUS, NOT INTRACTABLE: ICD-10-CM

## 2023-11-08 RX ORDER — TRAMADOL HYDROCHLORIDE 50 MG/1
50 TABLET ORAL EVERY 6 HOURS PRN
Qty: 60 TABLET | Refills: 0 | Status: SHIPPED | OUTPATIENT
Start: 2023-11-08

## 2023-11-30 DIAGNOSIS — K21.9 GASTROESOPHAGEAL REFLUX DISEASE WITHOUT ESOPHAGITIS: ICD-10-CM

## 2023-11-30 DIAGNOSIS — F51.01 PRIMARY INSOMNIA: ICD-10-CM

## 2023-11-30 DIAGNOSIS — F41.9 ANXIETY: ICD-10-CM

## 2023-11-30 DIAGNOSIS — J45.20 MILD INTERMITTENT EXTRINSIC ASTHMA WITHOUT COMPLICATION: ICD-10-CM

## 2023-11-30 DIAGNOSIS — I10 ESSENTIAL HYPERTENSION: ICD-10-CM

## 2023-12-01 RX ORDER — TRAZODONE HYDROCHLORIDE 50 MG/1
50 TABLET ORAL
Qty: 30 TABLET | Refills: 0 | Status: SHIPPED | OUTPATIENT
Start: 2023-12-01

## 2023-12-01 RX ORDER — VENLAFAXINE HYDROCHLORIDE 150 MG/1
300 CAPSULE, EXTENDED RELEASE ORAL DAILY
Qty: 180 CAPSULE | Refills: 1 | Status: SHIPPED | OUTPATIENT
Start: 2023-12-01

## 2023-12-01 RX ORDER — ZOLPIDEM TARTRATE 10 MG/1
10 TABLET ORAL
Qty: 30 TABLET | Refills: 0 | Status: SHIPPED | OUTPATIENT
Start: 2023-12-01

## 2023-12-01 RX ORDER — PANTOPRAZOLE SODIUM 40 MG/1
40 TABLET, DELAYED RELEASE ORAL DAILY
Qty: 90 TABLET | Refills: 1 | Status: SHIPPED | OUTPATIENT
Start: 2023-12-01

## 2023-12-01 RX ORDER — HYDROCHLOROTHIAZIDE 12.5 MG/1
12.5 TABLET ORAL DAILY
Qty: 90 TABLET | Refills: 1 | Status: SHIPPED | OUTPATIENT
Start: 2023-12-01

## 2023-12-01 RX ORDER — MONTELUKAST SODIUM 10 MG/1
10 TABLET ORAL
Qty: 90 TABLET | Refills: 1 | Status: SHIPPED | OUTPATIENT
Start: 2023-12-01

## 2023-12-04 ENCOUNTER — TELEPHONE (OUTPATIENT)
Dept: INTERNAL MEDICINE CLINIC | Facility: OTHER | Age: 48
End: 2023-12-04

## 2023-12-07 ENCOUNTER — OFFICE VISIT (OUTPATIENT)
Dept: INTERNAL MEDICINE CLINIC | Facility: OTHER | Age: 48
End: 2023-12-07
Payer: COMMERCIAL

## 2023-12-07 VITALS
HEIGHT: 68 IN | OXYGEN SATURATION: 97 % | TEMPERATURE: 98 F | DIASTOLIC BLOOD PRESSURE: 78 MMHG | HEART RATE: 74 BPM | RESPIRATION RATE: 20 BRPM | BODY MASS INDEX: 32.25 KG/M2 | WEIGHT: 212.8 LBS | SYSTOLIC BLOOD PRESSURE: 114 MMHG
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
## 2023-12-07 DIAGNOSIS — M54.12 CERVICAL RADICULOPATHY: ICD-10-CM

## 2023-12-07 PROCEDURE — 99214 OFFICE O/P EST MOD 30 MIN: CPT | Performed by: INTERNAL MEDICINE

## 2023-12-07 RX ORDER — METHOCARBAMOL 750 MG/1
750 TABLET, FILM COATED ORAL 4 TIMES DAILY PRN
COMMUNITY
Start: 2023-12-03

## 2023-12-07 RX ORDER — GABAPENTIN 300 MG/1
300 CAPSULE ORAL 3 TIMES DAILY
Qty: 90 CAPSULE | Refills: 1 | Status: SHIPPED | OUTPATIENT
Start: 2023-12-07
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
## 2023-12-12 DIAGNOSIS — G43.109 MIGRAINE WITH AURA AND WITHOUT STATUS MIGRAINOSUS, NOT INTRACTABLE: ICD-10-CM

## 2023-12-12 RX ORDER — TRAMADOL HYDROCHLORIDE 50 MG/1
50 TABLET ORAL EVERY 6 HOURS PRN
Qty: 60 TABLET | Refills: 0 | Status: SHIPPED | OUTPATIENT
Start: 2023-12-12

## 2024-01-08 ENCOUNTER — OFFICE VISIT (OUTPATIENT)
Dept: INTERNAL MEDICINE CLINIC | Facility: OTHER | Age: 49
End: 2024-01-08
Payer: COMMERCIAL

## 2024-01-08 VITALS
SYSTOLIC BLOOD PRESSURE: 130 MMHG | TEMPERATURE: 98.1 F | BODY MASS INDEX: 32.74 KG/M2 | HEART RATE: 113 BPM | OXYGEN SATURATION: 98 % | HEIGHT: 68 IN | DIASTOLIC BLOOD PRESSURE: 92 MMHG | WEIGHT: 216 LBS

## 2024-01-08 DIAGNOSIS — M54.2 PAIN OF NECK WITH RECENT TRAUMATIC INJURY: ICD-10-CM

## 2024-01-08 DIAGNOSIS — M48.02 SPINAL STENOSIS OF CERVICAL REGION: Primary | ICD-10-CM

## 2024-01-08 PROCEDURE — 99214 OFFICE O/P EST MOD 30 MIN: CPT | Performed by: FAMILY MEDICINE

## 2024-01-08 RX ORDER — CYCLOBENZAPRINE HCL 10 MG
10 TABLET ORAL 3 TIMES DAILY PRN
COMMUNITY
Start: 2023-12-21

## 2024-01-08 RX ORDER — TRAMADOL HYDROCHLORIDE 50 MG/1
50 TABLET ORAL EVERY 6 HOURS PRN
Qty: 60 TABLET | Refills: 1 | Status: SHIPPED | OUTPATIENT
Start: 2024-01-08

## 2024-01-08 RX ORDER — CELECOXIB 200 MG/1
200 CAPSULE ORAL DAILY
COMMUNITY
Start: 2023-12-21

## 2024-01-08 RX ORDER — ERGOCALCIFEROL 1.25 MG/1
50000 CAPSULE ORAL WEEKLY
Qty: 12 CAPSULE | Refills: 1 | Status: CANCELLED | OUTPATIENT
Start: 2024-01-08
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
## 2024-01-09 ENCOUNTER — TELEPHONE (OUTPATIENT)
Dept: INTERNAL MEDICINE CLINIC | Facility: OTHER | Age: 49
End: 2024-01-09

## 2024-01-09 NOTE — TELEPHONE ENCOUNTER
Patient would like to get a letter to keep him out of work until results of MRI & patient sees Neurosurgeon.

## 2024-01-11 ENCOUNTER — TELEPHONE (OUTPATIENT)
Dept: INTERNAL MEDICINE CLINIC | Facility: OTHER | Age: 49
End: 2024-01-11

## 2024-01-11 NOTE — TELEPHONE ENCOUNTER
Patient called and left a message that he was finally able to get scheduled with a neurosurgeon on 2/5/24.  He stated that you were waiting for that date in order to give him an excuse to be out of work.

## 2024-01-11 NOTE — TELEPHONE ENCOUNTER
Letter was completed and sent to [facility] for patient.  Patient was notified.

## 2024-01-16 ENCOUNTER — HOSPITAL ENCOUNTER (OUTPATIENT)
Dept: RADIOLOGY | Facility: IMAGING CENTER | Age: 49
Discharge: HOME | End: 2024-01-16
Payer: COMMERCIAL

## 2024-01-16 DIAGNOSIS — M48.02 SPINAL STENOSIS OF CERVICAL REGION: ICD-10-CM

## 2024-01-16 DIAGNOSIS — M54.2 PAIN OF NECK WITH RECENT TRAUMATIC INJURY: ICD-10-CM

## 2024-01-16 PROCEDURE — 72141 MRI NECK SPINE W/O DYE: CPT
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
## 2024-02-09 ENCOUNTER — TELEPHONE (OUTPATIENT)
Dept: INTERNAL MEDICINE CLINIC | Age: 49
End: 2024-02-09
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
## 2024-02-14 ENCOUNTER — CONSULT (OUTPATIENT)
Dept: INTERNAL MEDICINE CLINIC | Age: 49
End: 2024-02-14
Payer: COMMERCIAL

## 2024-02-14 VITALS
WEIGHT: 222 LBS | OXYGEN SATURATION: 99 % | BODY MASS INDEX: 33.65 KG/M2 | DIASTOLIC BLOOD PRESSURE: 80 MMHG | HEIGHT: 68 IN | HEART RATE: 99 BPM | TEMPERATURE: 98.9 F | SYSTOLIC BLOOD PRESSURE: 138 MMHG

## 2024-02-14 DIAGNOSIS — Z01.818 PREOPERATIVE GENERAL PHYSICAL EXAMINATION: Primary | ICD-10-CM

## 2024-02-14 DIAGNOSIS — M54.12 CERVICAL RADICULOPATHY: ICD-10-CM

## 2024-02-14 DIAGNOSIS — J45.20 MILD INTERMITTENT EXTRINSIC ASTHMA WITHOUT COMPLICATION: ICD-10-CM

## 2024-02-14 DIAGNOSIS — M48.02 SPINAL STENOSIS OF CERVICAL REGION: ICD-10-CM

## 2024-02-14 DIAGNOSIS — F11.20 CONTINUOUS OPIOID DEPENDENCE (HCC): ICD-10-CM

## 2024-02-14 DIAGNOSIS — E87.8 ELECTROLYTE ABNORMALITY: ICD-10-CM

## 2024-02-14 DIAGNOSIS — R76.0 LUPUS ANTICOAGULANT POSITIVE: ICD-10-CM

## 2024-02-14 DIAGNOSIS — I10 ESSENTIAL HYPERTENSION: ICD-10-CM

## 2024-02-14 PROCEDURE — 99215 OFFICE O/P EST HI 40 MIN: CPT | Performed by: INTERNAL MEDICINE
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
## 2024-02-15 ENCOUNTER — APPOINTMENT (OUTPATIENT)
Dept: LAB | Facility: IMAGING CENTER | Age: 49
End: 2024-02-15
Payer: COMMERCIAL

## 2024-02-15 DIAGNOSIS — E87.8 ELECTROLYTE ABNORMALITY: ICD-10-CM

## 2024-02-15 LAB
ALBUMIN SERPL BCP-MCNC: 4.9 G/DL (ref 3.5–5)
ALP SERPL-CCNC: 71 U/L (ref 34–104)
ALT SERPL W P-5'-P-CCNC: 43 U/L (ref 7–52)
ANION GAP SERPL CALCULATED.3IONS-SCNC: 10 MMOL/L
AST SERPL W P-5'-P-CCNC: 27 U/L (ref 13–39)
BILIRUB SERPL-MCNC: 0.45 MG/DL (ref 0.2–1)
BUN SERPL-MCNC: 18 MG/DL (ref 5–25)
CALCIUM SERPL-MCNC: 9.4 MG/DL (ref 8.4–10.2)
CHLORIDE SERPL-SCNC: 97 MMOL/L (ref 96–108)
CO2 SERPL-SCNC: 29 MMOL/L (ref 21–32)
CREAT SERPL-MCNC: 1.09 MG/DL (ref 0.6–1.3)
GFR SERPL CREATININE-BSD FRML MDRD: 80 ML/MIN/1.73SQ M
GLUCOSE SERPL-MCNC: 179 MG/DL (ref 65–140)
POTASSIUM SERPL-SCNC: 4.1 MMOL/L (ref 3.5–5.3)
PROT SERPL-MCNC: 7.3 G/DL (ref 6.4–8.4)
SODIUM SERPL-SCNC: 136 MMOL/L (ref 135–147)

## 2024-02-15 PROCEDURE — 80053 COMPREHEN METABOLIC PANEL: CPT

## 2024-02-15 PROCEDURE — 36415 COLL VENOUS BLD VENIPUNCTURE: CPT

## 2024-02-21 DIAGNOSIS — F51.01 PRIMARY INSOMNIA: ICD-10-CM

## 2024-02-21 DIAGNOSIS — F41.9 ANXIETY: ICD-10-CM

## 2024-02-21 RX ORDER — TRAZODONE HYDROCHLORIDE 50 MG/1
50 TABLET ORAL
Qty: 30 TABLET | Refills: 0 | Status: SHIPPED | OUTPATIENT
Start: 2024-02-21

## 2024-02-21 RX ORDER — ALPRAZOLAM 0.5 MG/1
0.5 TABLET ORAL DAILY PRN
Qty: 30 TABLET | Refills: 0 | Status: SHIPPED | OUTPATIENT
Start: 2024-02-21

## 2024-02-21 RX ORDER — ZOLPIDEM TARTRATE 10 MG/1
10 TABLET ORAL
Qty: 30 TABLET | Refills: 0 | Status: SHIPPED | OUTPATIENT
Start: 2024-02-21

## 2024-03-25 DIAGNOSIS — M48.02 SPINAL STENOSIS OF CERVICAL REGION: ICD-10-CM

## 2024-03-25 RX ORDER — TRAMADOL HYDROCHLORIDE 50 MG/1
50 TABLET ORAL EVERY 6 HOURS PRN
Qty: 60 TABLET | Refills: 0 | Status: SHIPPED | OUTPATIENT
Start: 2024-03-25

## 2024-04-10 DIAGNOSIS — F51.01 PRIMARY INSOMNIA: ICD-10-CM

## 2024-04-10 DIAGNOSIS — M48.02 SPINAL STENOSIS OF CERVICAL REGION: ICD-10-CM

## 2024-04-11 RX ORDER — TRAMADOL HYDROCHLORIDE 50 MG/1
50 TABLET ORAL EVERY 6 HOURS PRN
Qty: 60 TABLET | Refills: 0 | Status: SHIPPED | OUTPATIENT
Start: 2024-04-11

## 2024-04-11 RX ORDER — ZOLPIDEM TARTRATE 10 MG/1
10 TABLET ORAL
Qty: 30 TABLET | Refills: 0 | Status: SHIPPED | OUTPATIENT
Start: 2024-04-11

## 2024-04-27 DIAGNOSIS — M48.02 SPINAL STENOSIS OF CERVICAL REGION: ICD-10-CM

## 2024-04-27 DIAGNOSIS — F51.01 PRIMARY INSOMNIA: ICD-10-CM

## 2024-04-29 RX ORDER — TRAMADOL HYDROCHLORIDE 50 MG/1
50 TABLET ORAL EVERY 6 HOURS PRN
Qty: 60 TABLET | Refills: 0 | Status: SHIPPED | OUTPATIENT
Start: 2024-04-29

## 2024-04-29 RX ORDER — ZOLPIDEM TARTRATE 10 MG/1
10 TABLET ORAL
Qty: 30 TABLET | Refills: 0 | Status: SHIPPED | OUTPATIENT
Start: 2024-04-29

## 2024-06-01 DIAGNOSIS — K21.9 GASTROESOPHAGEAL REFLUX DISEASE WITHOUT ESOPHAGITIS: ICD-10-CM

## 2024-06-01 DIAGNOSIS — I10 ESSENTIAL HYPERTENSION: ICD-10-CM

## 2024-06-01 DIAGNOSIS — F41.9 ANXIETY: ICD-10-CM

## 2024-06-01 DIAGNOSIS — M48.02 SPINAL STENOSIS OF CERVICAL REGION: ICD-10-CM

## 2024-06-01 RX ORDER — VENLAFAXINE HYDROCHLORIDE 150 MG/1
300 CAPSULE, EXTENDED RELEASE ORAL DAILY
Qty: 180 CAPSULE | Refills: 1 | Status: SHIPPED | OUTPATIENT
Start: 2024-06-01

## 2024-06-01 RX ORDER — HYDROCHLOROTHIAZIDE 12.5 MG/1
12.5 TABLET ORAL DAILY
Qty: 90 TABLET | Refills: 1 | Status: SHIPPED | OUTPATIENT
Start: 2024-06-01

## 2024-06-01 RX ORDER — PANTOPRAZOLE SODIUM 40 MG/1
40 TABLET, DELAYED RELEASE ORAL DAILY
Qty: 90 TABLET | Refills: 1 | Status: SHIPPED | OUTPATIENT
Start: 2024-06-01

## 2024-06-03 RX ORDER — TRAMADOL HYDROCHLORIDE 50 MG/1
50 TABLET ORAL EVERY 6 HOURS PRN
Qty: 60 TABLET | Refills: 0 | Status: SHIPPED | OUTPATIENT
Start: 2024-06-03

## 2024-06-20 ENCOUNTER — VBI (OUTPATIENT)
Dept: ADMINISTRATIVE | Facility: OTHER | Age: 49
End: 2024-06-20

## 2024-06-20 NOTE — TELEPHONE ENCOUNTER
06/20/24 3:43 PM     Chart reviewed for CRC: Colonoscopy ; nothing is submitted to the patient's insurance at this time.     Tylor Sweeney MA   [facility]

## 2024-07-01 ENCOUNTER — RA CDI HCC (OUTPATIENT)
Dept: OTHER | Facility: HOSPITAL | Age: 49
End: 2024-07-01

## 2024-07-08 RX ORDER — AMOXICILLIN 250 MG
1 CAPSULE ORAL 2 TIMES DAILY PRN
COMMUNITY
Start: 2024-03-22 | End: 2024-07-09

## 2024-07-08 RX ORDER — METHYLPREDNISOLONE 4 MG
TABLET, DOSE PACK ORAL
COMMUNITY
Start: 2024-04-18 | End: 2024-07-09

## 2024-07-08 RX ORDER — CYCLOBENZAPRINE HCL 5 MG
TABLET ORAL
COMMUNITY
Start: 2024-04-24 | End: 2024-07-09

## 2024-07-09 ENCOUNTER — OFFICE VISIT (OUTPATIENT)
Dept: INTERNAL MEDICINE CLINIC | Age: 49
End: 2024-07-09
Payer: COMMERCIAL

## 2024-07-09 VITALS
DIASTOLIC BLOOD PRESSURE: 70 MMHG | OXYGEN SATURATION: 98 % | TEMPERATURE: 98.6 F | HEART RATE: 91 BPM | SYSTOLIC BLOOD PRESSURE: 130 MMHG | WEIGHT: 223 LBS | BODY MASS INDEX: 33.8 KG/M2 | HEIGHT: 68 IN

## 2024-07-09 DIAGNOSIS — Z12.11 SCREENING FOR MALIGNANT NEOPLASM OF COLON: ICD-10-CM

## 2024-07-09 DIAGNOSIS — M48.02 SPINAL STENOSIS OF CERVICAL REGION: ICD-10-CM

## 2024-07-09 DIAGNOSIS — F51.01 PRIMARY INSOMNIA: ICD-10-CM

## 2024-07-09 DIAGNOSIS — E55.9 VITAMIN D DEFICIENCY: ICD-10-CM

## 2024-07-09 DIAGNOSIS — R76.0 LUPUS ANTICOAGULANT POSITIVE: ICD-10-CM

## 2024-07-09 DIAGNOSIS — Z13.220 SCREENING CHOLESTEROL LEVEL: ICD-10-CM

## 2024-07-09 DIAGNOSIS — I10 ESSENTIAL HYPERTENSION: Primary | ICD-10-CM

## 2024-07-09 DIAGNOSIS — R73.01 ABNORMAL FASTING GLUCOSE: ICD-10-CM

## 2024-07-09 DIAGNOSIS — G43.109 MIGRAINE WITH AURA AND WITHOUT STATUS MIGRAINOSUS, NOT INTRACTABLE: ICD-10-CM

## 2024-07-09 DIAGNOSIS — K21.9 GASTROESOPHAGEAL REFLUX DISEASE WITHOUT ESOPHAGITIS: ICD-10-CM

## 2024-07-09 PROBLEM — Z01.818 PREOPERATIVE GENERAL PHYSICAL EXAMINATION: Status: RESOLVED | Noted: 2024-02-14 | Resolved: 2024-07-09

## 2024-07-09 PROCEDURE — 99214 OFFICE O/P EST MOD 30 MIN: CPT | Performed by: FAMILY MEDICINE

## 2024-07-09 RX ORDER — TRAZODONE HYDROCHLORIDE 100 MG/1
100 TABLET ORAL
Qty: 30 TABLET | Refills: 1 | Status: SHIPPED | OUTPATIENT
Start: 2024-07-09

## 2024-07-09 RX ORDER — CYCLOBENZAPRINE HCL 10 MG
10 TABLET ORAL
Qty: 30 TABLET | Refills: 1 | Status: SHIPPED | OUTPATIENT
Start: 2024-07-09

## 2024-07-09 RX ORDER — SUMATRIPTAN 25 MG/1
25 TABLET, FILM COATED ORAL AS NEEDED
Qty: 30 TABLET | Refills: 1 | Status: SHIPPED | OUTPATIENT
Start: 2024-07-09

## 2024-07-09 RX ORDER — TRAMADOL HYDROCHLORIDE 50 MG/1
50 TABLET ORAL 2 TIMES DAILY PRN
Qty: 60 TABLET | Refills: 0 | Status: SHIPPED | OUTPATIENT
Start: 2024-07-09
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
## 2024-07-27 DIAGNOSIS — J45.20 MILD INTERMITTENT EXTRINSIC ASTHMA WITHOUT COMPLICATION: ICD-10-CM

## 2024-07-28 RX ORDER — MONTELUKAST SODIUM 10 MG/1
10 TABLET ORAL
Qty: 100 TABLET | Refills: 1 | Status: SHIPPED | OUTPATIENT
Start: 2024-07-28

## 2024-07-31 ENCOUNTER — PREP FOR PROCEDURE (OUTPATIENT)
Age: 49
End: 2024-07-31

## 2024-07-31 ENCOUNTER — TELEPHONE (OUTPATIENT)
Age: 49
End: 2024-07-31

## 2024-07-31 DIAGNOSIS — Z12.11 SCREENING FOR COLON CANCER: Primary | ICD-10-CM

## 2024-07-31 DIAGNOSIS — F51.01 PRIMARY INSOMNIA: ICD-10-CM

## 2024-07-31 RX ORDER — TRAZODONE HYDROCHLORIDE 100 MG/1
100 TABLET ORAL
Qty: 100 TABLET | Refills: 1 | Status: SHIPPED | OUTPATIENT
Start: 2024-07-31

## 2024-07-31 NOTE — TELEPHONE ENCOUNTER
07/31/24  Screened by: Freya Peña    Referring Provider     Pre- Screening:     There is no height or weight on file to calculate BMI.  Has patient been referred for a routine screening Colonoscopy? yes  Is the patient between 45-75 years old? yes      Previous Colonoscopy yes   If yes:    Date:     Facility:     Reason:         Does the patient want to see a Gastroenterologist prior to their procedure OR are they having any GI symptoms? no    Has the patient been hospitalized or had abdominal surgery in the past 6 months? no    Does the patient use supplemental oxygen? no    Does the patient take Coumadin, Lovenox, Plavix, Elliquis, Xarelto, or other blood thinning medication? no    Has the patient had a stroke, cardiac event, or stent placed in the past year? no      If patient is between 45yrs - 49yrs, please advise patient that we will have to confirm benefits & coverage with their insurance company for a routine screening colonoscopy.

## 2024-07-31 NOTE — TELEPHONE ENCOUNTER
Scheduled date of colonoscopy (as of today):  8/19/24  Physician performing colonoscopy: SALLY  Location of colonoscopy:   Bowel prep reviewed with patient: JERZY / МАРИЯ  Instructions reviewed with patient by: BETHANY  Clearances: N/A

## 2024-08-06 DIAGNOSIS — F41.9 ANXIETY: ICD-10-CM

## 2024-08-06 RX ORDER — ALPRAZOLAM 0.5 MG/1
0.5 TABLET ORAL DAILY PRN
Qty: 30 TABLET | Refills: 0 | Status: SHIPPED | OUTPATIENT
Start: 2024-08-06

## 2024-09-01 DIAGNOSIS — M48.02 SPINAL STENOSIS OF CERVICAL REGION: ICD-10-CM

## 2024-09-05 RX ORDER — TRAMADOL HYDROCHLORIDE 50 MG/1
50 TABLET ORAL 2 TIMES DAILY PRN
Qty: 60 TABLET | Refills: 0 | Status: SHIPPED | OUTPATIENT
Start: 2024-09-05

## 2024-10-28 DIAGNOSIS — M48.02 SPINAL STENOSIS OF CERVICAL REGION: ICD-10-CM

## 2024-10-28 DIAGNOSIS — F41.9 ANXIETY: ICD-10-CM

## 2024-10-30 RX ORDER — ALPRAZOLAM 0.5 MG
0.5 TABLET ORAL DAILY PRN
Qty: 30 TABLET | Refills: 0 | Status: SHIPPED | OUTPATIENT
Start: 2024-10-30

## 2024-10-30 RX ORDER — TRAMADOL HYDROCHLORIDE 50 MG/1
50 TABLET ORAL 2 TIMES DAILY PRN
Qty: 60 TABLET | Refills: 0 | Status: SHIPPED | OUTPATIENT
Start: 2024-10-30

## 2024-11-24 DIAGNOSIS — F41.9 ANXIETY: ICD-10-CM

## 2024-11-24 DIAGNOSIS — K21.9 GASTROESOPHAGEAL REFLUX DISEASE WITHOUT ESOPHAGITIS: ICD-10-CM

## 2024-11-26 RX ORDER — PANTOPRAZOLE SODIUM 40 MG/1
40 TABLET, DELAYED RELEASE ORAL DAILY
Qty: 90 TABLET | Refills: 1 | Status: SHIPPED | OUTPATIENT
Start: 2024-11-26

## 2024-11-26 RX ORDER — VENLAFAXINE HYDROCHLORIDE 150 MG/1
300 CAPSULE, EXTENDED RELEASE ORAL DAILY
Qty: 180 CAPSULE | Refills: 1 | Status: SHIPPED | OUTPATIENT
Start: 2024-11-26

## 2024-12-11 ENCOUNTER — VBI (OUTPATIENT)
Dept: ADMINISTRATIVE | Facility: OTHER | Age: 49
End: 2024-12-11

## 2024-12-11 NOTE — TELEPHONE ENCOUNTER
12/11/24 6:32 PM     Chart reviewed for CRC: Colonoscopy ; nothing is submitted to the patient's insurance at this time.     Tylor Sweeney MA   [facility]

## 2025-01-16 ENCOUNTER — OFFICE VISIT (OUTPATIENT)
Dept: INTERNAL MEDICINE CLINIC | Facility: OTHER | Age: 50
End: 2025-01-16
Payer: COMMERCIAL

## 2025-01-16 ENCOUNTER — APPOINTMENT (OUTPATIENT)
Dept: LAB | Facility: IMAGING CENTER | Age: 50
End: 2025-01-16
Payer: COMMERCIAL

## 2025-01-16 VITALS
SYSTOLIC BLOOD PRESSURE: 152 MMHG | DIASTOLIC BLOOD PRESSURE: 88 MMHG | TEMPERATURE: 98 F | HEART RATE: 100 BPM | WEIGHT: 230 LBS | BODY MASS INDEX: 34.97 KG/M2 | OXYGEN SATURATION: 95 %

## 2025-01-16 DIAGNOSIS — J45.20 MILD INTERMITTENT EXTRINSIC ASTHMA WITHOUT COMPLICATION: ICD-10-CM

## 2025-01-16 DIAGNOSIS — R09.82 POST-NASAL DRIP: ICD-10-CM

## 2025-01-16 DIAGNOSIS — I10 ESSENTIAL HYPERTENSION: ICD-10-CM

## 2025-01-16 DIAGNOSIS — E55.9 VITAMIN D DEFICIENCY: ICD-10-CM

## 2025-01-16 DIAGNOSIS — F11.20 CONTINUOUS OPIOID DEPENDENCE (HCC): ICD-10-CM

## 2025-01-16 DIAGNOSIS — M48.02 SPINAL STENOSIS OF CERVICAL REGION: ICD-10-CM

## 2025-01-16 DIAGNOSIS — R73.01 ABNORMAL FASTING GLUCOSE: ICD-10-CM

## 2025-01-16 DIAGNOSIS — Z23 ENCOUNTER FOR IMMUNIZATION: ICD-10-CM

## 2025-01-16 DIAGNOSIS — R63.5 WEIGHT GAIN: ICD-10-CM

## 2025-01-16 DIAGNOSIS — E11.65 UNCONTROLLED TYPE 2 DIABETES MELLITUS WITH HYPERGLYCEMIA (HCC): Primary | ICD-10-CM

## 2025-01-16 DIAGNOSIS — F41.1 GENERALIZED ANXIETY DISORDER: ICD-10-CM

## 2025-01-16 DIAGNOSIS — Z13.220 SCREENING CHOLESTEROL LEVEL: ICD-10-CM

## 2025-01-16 DIAGNOSIS — G47.33 OSA (OBSTRUCTIVE SLEEP APNEA): ICD-10-CM

## 2025-01-16 LAB
25(OH)D3 SERPL-MCNC: 21.3 NG/ML (ref 30–100)
BASOPHILS # BLD AUTO: 0.05 THOUSANDS/ΜL (ref 0–0.1)
BASOPHILS NFR BLD AUTO: 1 % (ref 0–1)
EOSINOPHIL # BLD AUTO: 0.56 THOUSAND/ΜL (ref 0–0.61)
EOSINOPHIL NFR BLD AUTO: 6 % (ref 0–6)
ERYTHROCYTE [DISTWIDTH] IN BLOOD BY AUTOMATED COUNT: 12.6 % (ref 11.6–15.1)
EST. AVERAGE GLUCOSE BLD GHB EST-MCNC: 157 MG/DL
HBA1C MFR BLD: 7.1 %
HCT VFR BLD AUTO: 51 % (ref 36.5–49.3)
HGB BLD-MCNC: 17 G/DL (ref 12–17)
IMM GRANULOCYTES # BLD AUTO: 0.03 THOUSAND/UL (ref 0–0.2)
IMM GRANULOCYTES NFR BLD AUTO: 0 % (ref 0–2)
LYMPHOCYTES # BLD AUTO: 3.63 THOUSANDS/ΜL (ref 0.6–4.47)
LYMPHOCYTES NFR BLD AUTO: 39 % (ref 14–44)
MCH RBC QN AUTO: 30.7 PG (ref 26.8–34.3)
MCHC RBC AUTO-ENTMCNC: 33.3 G/DL (ref 31.4–37.4)
MCV RBC AUTO: 92 FL (ref 82–98)
MONOCYTES # BLD AUTO: 0.73 THOUSAND/ΜL (ref 0.17–1.22)
MONOCYTES NFR BLD AUTO: 8 % (ref 4–12)
NEUTROPHILS # BLD AUTO: 4.21 THOUSANDS/ΜL (ref 1.85–7.62)
NEUTS SEG NFR BLD AUTO: 46 % (ref 43–75)
NRBC BLD AUTO-RTO: 0 /100 WBCS
PLATELET # BLD AUTO: 330 THOUSANDS/UL (ref 149–390)
PMV BLD AUTO: 10.9 FL (ref 8.9–12.7)
RBC # BLD AUTO: 5.53 MILLION/UL (ref 3.88–5.62)
TSH SERPL DL<=0.05 MIU/L-ACNC: 1.3 UIU/ML (ref 0.45–4.5)
WBC # BLD AUTO: 9.21 THOUSAND/UL (ref 4.31–10.16)

## 2025-01-16 PROCEDURE — 99214 OFFICE O/P EST MOD 30 MIN: CPT | Performed by: FAMILY MEDICINE

## 2025-01-16 PROCEDURE — 85025 COMPLETE CBC W/AUTO DIFF WBC: CPT

## 2025-01-16 PROCEDURE — 82306 VITAMIN D 25 HYDROXY: CPT

## 2025-01-16 PROCEDURE — 90656 IIV3 VACC NO PRSV 0.5 ML IM: CPT

## 2025-01-16 PROCEDURE — 83036 HEMOGLOBIN GLYCOSYLATED A1C: CPT

## 2025-01-16 PROCEDURE — 90471 IMMUNIZATION ADMIN: CPT

## 2025-01-16 PROCEDURE — 36415 COLL VENOUS BLD VENIPUNCTURE: CPT

## 2025-01-16 PROCEDURE — 80061 LIPID PANEL: CPT

## 2025-01-16 PROCEDURE — 84443 ASSAY THYROID STIM HORMONE: CPT

## 2025-01-16 PROCEDURE — 80053 COMPREHEN METABOLIC PANEL: CPT

## 2025-01-16 RX ORDER — MULTIVITAMIN
1 TABLET ORAL DAILY
COMMUNITY

## 2025-01-16 RX ORDER — FLUTICASONE PROPIONATE 50 MCG
1 SPRAY, SUSPENSION (ML) NASAL DAILY
Qty: 18.2 ML | Refills: 2 | Status: SHIPPED | OUTPATIENT
Start: 2025-01-16

## 2025-01-16 RX ORDER — HYDROCHLOROTHIAZIDE 12.5 MG/1
12.5 TABLET ORAL DAILY
Qty: 90 TABLET | Refills: 1 | Status: SHIPPED | OUTPATIENT
Start: 2025-01-16

## 2025-01-16 RX ORDER — ERGOCALCIFEROL 1.25 MG/1
50000 CAPSULE, LIQUID FILLED ORAL WEEKLY
Qty: 12 CAPSULE | Refills: 1 | Status: SHIPPED | OUTPATIENT
Start: 2025-01-16

## 2025-01-16 RX ORDER — TRAMADOL HYDROCHLORIDE 50 MG/1
50 TABLET ORAL 2 TIMES DAILY PRN
Qty: 60 TABLET | Refills: 0 | Status: SHIPPED | OUTPATIENT
Start: 2025-01-16

## 2025-01-16 NOTE — PATIENT INSTRUCTIONS
"Patient Education     Type 2 diabetes   The Basics   Written by the doctors and editors at [facility]   What is type 2 diabetes? -- This is a disorder that disrupts the way the body uses sugar. It is sometimes called type 2 diabetes mellitus.  All of the cells in the body need sugar to work normally. Sugar gets into the cells with the help of a hormone called insulin. Insulin is made by the pancreas, an organ in the belly. If there is not enough insulin, or if cells in the body don't respond normally to insulin, sugar builds up in the blood. That is what happens to people with diabetes.  There are 2 different types of diabetes:   In type 1 diabetes, the pancreas makes little or no insulin.   In type 2 diabetes, the pancreas still makes some insulin, but the cells in the body stop responding normally. Eventually, the pancreas cannot make enough insulin to keep up.  Having excess body weight or obesity increases a person's risk of developing type 2 diabetes. But people without excess body weight can get diabetes, too.  What are the symptoms of type 2 diabetes? -- Type 2 diabetes usually causes no symptoms. When symptoms do happen, they include:   Needing to urinate often   Intense thirst   Blurry vision  Can diabetes lead to other health problems? -- Yes. Type 2 diabetes might not make you feel sick. But if it is not managed, it can lead to serious problems over time, such as:   Heart attacks   Strokes   Kidney disease   Vision problems (or even blindness)   Pain or loss of feeling in the hands and feet   Needing to have fingers, toes, or other body parts removed (amputated)  How do I know if I have type 2 diabetes? -- Your doctor or nurse can do a blood test. There are 2 tests that can be used for this. Both involve measuring the amount of sugar in your blood, called your \"blood sugar\" or \"blood glucose\":   One of the tests measures your blood sugar at the time the blood sample is taken. This test is done in the " "morning. You can't eat or drink anything except water for at least 8 hours before the test.   The other test shows what your average blood sugar has been for the past 2 to 3 months. This blood test is called \"hemoglobin A1C\" or just \"A1C.\" It can be checked at any time of the day, even if you have recently eaten.  How is type 2 diabetes treated? -- The goals of treatment are to manage your blood sugar and lower the risk of future problems that can happen in people with diabetes.  Treatment might include:   Lifestyle changes - This is an important part of managing diabetes. It includes eating healthy foods and getting plenty of physical activity.   Medicines - There are a few medicines that help lower blood sugar. Some people need to take pills that help the body make more insulin or that help insulin do its job. Others need insulin shots.  Depending on what medicines you take, you might need to check your blood sugar regularly at home. But not everyone with type 2 diabetes needs to do this. Your doctor or nurse will tell you if you should be checking your blood sugar, and when and how to do this.  Sometimes, people with type 2 diabetes also need medicines to help prevent problems caused by the disease. For instance, medicines used to lower blood pressure can reduce the chances of a heart attack or stroke.   General medical care - It's also important to take care of other areas of your health. This includes watching your blood pressure and cholesterol levels. You should also get certain vaccines, such as vaccines to protect against the flu and coronavirus disease 2019 (\"COVID-19\"). Some people also need a vaccine to prevent pneumonia.  Can type 2 diabetes be prevented? -- Yes. To lower your chances of getting type 2 diabetes, the most important thing you can do is eat a healthy diet and get plenty of physical activity. This can help you lose weight if you are overweight. But eating well and being active are also good " for your overall health. Even gentle activity, like walking, has benefits.  If you smoke, quitting can also lower your risk of type 2 diabetes. Quitting smoking can be difficult, but your doctor or nurse can help.  All topics are updated as new evidence becomes available and our peer review process is complete.  This topic retrieved from [employer] on: Apr 24, 2024.  Topic 20760 Version 23.0  Release: 32.3.2 - C32.113  © 2024 UpToDate, Inc. and/or its affiliates. All rights reserved.  Consumer Information Use and Disclaimer   Disclaimer: This generalized information is a limited summary of diagnosis, treatment, and/or medication information. It is not meant to be comprehensive and should be used as a tool to help the user understand and/or assess potential diagnostic and treatment options. It does NOT include all information about conditions, treatments, medications, side effects, or risks that may apply to a specific patient. It is not intended to be medical advice or a substitute for the medical advice, diagnosis, or treatment of a health care provider based on the health care provider's examination and assessment of a patient's specific and unique circumstances. Patients must speak with a health care provider for complete information about their health, medical questions, and treatment options, including any risks or benefits regarding use of medications. This information does not endorse any treatments or medications as safe, effective, or approved for treating a specific patient. UpToDate, Inc. and its affiliates disclaim any warranty or liability relating to this information or the use thereof.The use of this information is governed by the Terms of Use, available at https://www.wolterskluwer.com/en/know/clinical-effectiveness-terms. 2024© UpToDate, Inc. and its affiliates and/or licensors. All rights reserved.  Copyright   © 2024 UpToDate, Inc. and/or its affiliates. All rights reserved.
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
## 2025-01-17 ENCOUNTER — TELEPHONE (OUTPATIENT)
Dept: INTERNAL MEDICINE CLINIC | Facility: OTHER | Age: 50
End: 2025-01-17

## 2025-01-17 LAB
ALBUMIN SERPL BCG-MCNC: 4.7 G/DL (ref 3.5–5)
ALP SERPL-CCNC: 61 U/L (ref 34–104)
ALT SERPL W P-5'-P-CCNC: 42 U/L (ref 7–52)
ANION GAP SERPL CALCULATED.3IONS-SCNC: 8 MMOL/L (ref 4–13)
AST SERPL W P-5'-P-CCNC: 26 U/L (ref 13–39)
BILIRUB SERPL-MCNC: 0.78 MG/DL (ref 0.2–1)
BUN SERPL-MCNC: 15 MG/DL (ref 5–25)
CALCIUM SERPL-MCNC: 8.6 MG/DL (ref 8.4–10.2)
CHLORIDE SERPL-SCNC: 98 MMOL/L (ref 96–108)
CHOLEST SERPL-MCNC: 160 MG/DL (ref ?–200)
CO2 SERPL-SCNC: 34 MMOL/L (ref 21–32)
CREAT SERPL-MCNC: 1.16 MG/DL (ref 0.6–1.3)
GFR SERPL CREATININE-BSD FRML MDRD: 74 ML/MIN/1.73SQ M
GLUCOSE P FAST SERPL-MCNC: 149 MG/DL (ref 65–99)
HDLC SERPL-MCNC: 45 MG/DL
LDLC SERPL CALC-MCNC: 99 MG/DL (ref 0–100)
POTASSIUM SERPL-SCNC: 4 MMOL/L (ref 3.5–5.3)
PROT SERPL-MCNC: 7.7 G/DL (ref 6.4–8.4)
SODIUM SERPL-SCNC: 140 MMOL/L (ref 135–147)
TRIGL SERPL-MCNC: 81 MG/DL (ref ?–150)

## 2025-01-17 NOTE — TELEPHONE ENCOUNTER
Received a medication prior authorization request from cover my meds for the following medication:      traMADol (ULTRAM) 50 mg tablet [[id number]]    Order Details  Dose: 50 mg Route: Oral Frequency: 2 times daily PRN for as needed   Dispense Quantity: 60 tablet Refills: 0          Sig: Take 1 tablet (50 mg total) by mouth 2 (two) times a day as needed (as needed)         Start Date: 01/16/25 End Date: --   Written Date: 01/16/25 Expiration Date: 07/15/25       Associated Diagnoses: Spinal stenosis of cervical region [M48.02]     Form has been scanned into patients chart.

## 2025-01-20 NOTE — TELEPHONE ENCOUNTER
PA for TraMADol (ULTRAM) 50 mg tablet SUBMITTED to [facility]    via    []CMM-KEY:   [x]Surescripts-Case ID #: [id number]   []Availity-Auth ID #  []Faxed to plan   []Other website   []Phone call Case ID #     []PA sent as URGENT    All office notes, labs and other pertaining documents and studies sent. Clinical questions answered. Awaiting determination from insurance company.     Turnaround time for your insurance to make a decision on your Prior Authorization can take 7-21 business days.

## 2025-01-21 ENCOUNTER — TELEPHONE (OUTPATIENT)
Age: 50
End: 2025-01-21

## 2025-01-21 NOTE — TELEPHONE ENCOUNTER
Patient was taking the nasal spray 2 sprays each nostril daily . Please advise.   CONSTITUTIONAL: Well appearing, awake, alert, oriented to person, place, time/situation and in no apparent distress.  · ENMT: Airway patent, Nasal mucosa clear. Mouth with normal mucosa. Throat has no vesicles, no oropharyngeal exudates and uvula is midline.  · EYES: Clear bilaterally, pupils equal, round and reactive to light.  · CARDIAC: Normal rate, regular rhythm.  Heart sounds S1, S2.  No murmurs, rubs or gallops.  · RESPIRATORY: Breath sounds clear and equal bilaterally.  · GASTROINTESTINAL: Abdomen soft,  left lower quadrant and left CVA tenderness without rebound or guarding  · MUSCULOSKELETAL: Spine appears normal,   Left lower extremity pain with range of motion to include flexion and extension.  No tenderness on compression of bilateral calf muscles there is no edema noted  · NEUROLOGICAL: Alert and oriented, no focal deficits, no motor or sensory deficits.  · SKIN: Skin normal color for race, warm, dry and intact. No evidence of rash

## 2025-01-21 NOTE — TELEPHONE ENCOUNTER
PA for TraMADol (ULTRAM) 50 mg tablet APPROVED     Date(s) approved January 20, 2025 to July 19, 2025     Case #: [id number]       Patient advised by          [][employer] Message  [x]Phone call   [x]LMOM  []L/M to call office as no active Communication consent on file  []Unable to leave detailed message as VM not approved on Communication consent       Pharmacy advised by    [x]Fax  []Phone call    Approval letter scanned into [employer] Yes

## 2025-01-21 NOTE — TELEPHONE ENCOUNTER
Spoke with patient he was started on Metformin on 1/16/2025 since then has has been having  migraines. Please advise

## 2025-01-27 NOTE — TELEPHONE ENCOUNTER
Per Dr. Jarvis patient should stop Metformin for 1 week and monitor then restart medication. Spoke with patient relayed Dr. Jarvis message. Please call office with update.

## 2025-02-12 RX ORDER — FLUNISOLIDE 0.25 MG/ML
1 SOLUTION NASAL EVERY 12 HOURS
Qty: 25 ML | Refills: 0 | Status: SHIPPED | OUTPATIENT
Start: 2025-02-12

## 2025-03-05 ENCOUNTER — OFFICE VISIT (OUTPATIENT)
Dept: INTERNAL MEDICINE CLINIC | Facility: OTHER | Age: 50
End: 2025-03-05
Payer: COMMERCIAL

## 2025-03-05 VITALS
TEMPERATURE: 98.3 F | SYSTOLIC BLOOD PRESSURE: 138 MMHG | OXYGEN SATURATION: 96 % | HEART RATE: 90 BPM | DIASTOLIC BLOOD PRESSURE: 88 MMHG | WEIGHT: 219 LBS | HEIGHT: 68 IN | BODY MASS INDEX: 33.19 KG/M2

## 2025-03-05 DIAGNOSIS — J32.9 RHINOSINUSITIS: ICD-10-CM

## 2025-03-05 DIAGNOSIS — J32.9 RHINOSINUSITIS: Primary | ICD-10-CM

## 2025-03-05 DIAGNOSIS — E11.65 UNCONTROLLED TYPE 2 DIABETES MELLITUS WITH HYPERGLYCEMIA (HCC): ICD-10-CM

## 2025-03-05 DIAGNOSIS — J45.20 MILD INTERMITTENT EXTRINSIC ASTHMA WITHOUT COMPLICATION: ICD-10-CM

## 2025-03-05 DIAGNOSIS — I10 ESSENTIAL HYPERTENSION: ICD-10-CM

## 2025-03-05 LAB
SARS-COV-2 AG UPPER RESP QL IA: NEGATIVE
SL AMB POCT RAPID FLU A: NEGATIVE
SL AMB POCT RAPID FLU B: NEGATIVE
VALID CONTROL: NORMAL

## 2025-03-05 PROCEDURE — 99213 OFFICE O/P EST LOW 20 MIN: CPT | Performed by: INTERNAL MEDICINE

## 2025-03-05 PROCEDURE — 87811 SARS-COV-2 COVID19 W/OPTIC: CPT | Performed by: INTERNAL MEDICINE

## 2025-03-05 PROCEDURE — 87804 INFLUENZA ASSAY W/OPTIC: CPT | Performed by: INTERNAL MEDICINE

## 2025-03-05 RX ORDER — FLUTICASONE PROPIONATE 50 MCG
1 SPRAY, SUSPENSION (ML) NASAL DAILY
Qty: 9.9 ML | Refills: 0 | Status: SHIPPED | OUTPATIENT
Start: 2025-03-05

## 2025-03-05 RX ORDER — ALBUTEROL SULFATE 90 UG/1
2 INHALANT RESPIRATORY (INHALATION) EVERY 6 HOURS PRN
Qty: 8.5 G | Refills: 1 | Status: SHIPPED | OUTPATIENT
Start: 2025-03-05

## 2025-03-05 NOTE — ASSESSMENT & PLAN NOTE
Continue with current antihypertensive regimen of HCTZ. BP of 138/88 in office today. Can consider adjustment when he is not acutely ill.

## 2025-03-05 NOTE — PROGRESS NOTES
Name: Theron Rendon      : 1975      MRN: [id number]  Encounter Provider: Kevin Roth DO  Encounter Date: 3/5/2025   Encounter department: [facility]  :  Assessment & Plan  Rhinosinusitis  Exam notable for tenderness over the left maxillary sinus. Patient reports significant left sided nasal discharge. No pharyngitis. Given recent onset and improving symptoms, this most likely represent a viral rhinosinusitis. Will treat symptomatically for now. Advised the patient to return to this office if he does not experience resolution of symptoms in one week.     Orders:    POCT Rapid Covid Ag    POCT rapid flu A and B    fluticasone (FLONASE) 50 mcg/act nasal spray; 1 spray into each nostril daily    albuterol (ProAir HFA) 90 mcg/act inhaler; Inhale 2 puffs every 6 (six) hours as needed for wheezing    Mild intermittent extrinsic asthma without complication  Possible exacerbation of prior asthma in setting of postnasal drip. He does also note some shortness of breath. Will treat with albuterol inhaler.        Essential hypertension  Continue with current antihypertensive regimen of HCTZ. BP of 138/88 in office today. Can consider adjustment when he is not acutely ill.        Uncontrolled type 2 diabetes mellitus with hyperglycemia (HCC)    Lab Results   Component Value Date    HGBA1C 7.1 (H) 2025     Continue with metformin 500 mg BID. Will repeat A1c at his next visit.               History of Present Illness   Theron Rendon is a 49 y.o. male who presents to the office today complaining of fevers, chills, and fatigue, and rhinorrhea.  Patient was last seen at our office on 25 with Dr. Jarvis. They have a PMH notable for Asthma, DM, Vit D Deficiency, HTN, postnasal drip, BHARGAV, UZAIR.    He states that his co-workers have recently been ill with similar symptoms. Symptoms began on  and have gradually been improving. Nobody in his household is sick. No  "smoking. Social alcohol use.       Review of Systems   Constitutional:  Positive for chills, fatigue and fever.   HENT:  Positive for congestion and rhinorrhea. Negative for ear pain, facial swelling, hearing loss, sinus pressure, sinus pain, tinnitus and voice change.    Eyes:  Negative for redness and visual disturbance.   Respiratory:  Positive for shortness of breath and wheezing. Negative for cough.    Cardiovascular:  Positive for chest pain. Negative for palpitations and leg swelling.   Gastrointestinal:  Negative for abdominal pain, diarrhea, nausea and vomiting.   Neurological:  Positive for light-headedness. Negative for dizziness, facial asymmetry and headaches.       Objective   /88 (BP Location: Left arm, Patient Position: Sitting, Cuff Size: Standard)   Pulse 90   Temp 98.3 °F (36.8 °C) (Temporal)   Ht 5' 8\" (1.727 m)   Wt 99.3 kg (219 lb)   SpO2 96%   BMI 33.30 kg/m²      Physical Exam  Vitals reviewed.   Constitutional:       General: He is not in acute distress.     Appearance: Normal appearance. He is ill-appearing. He is not diaphoretic.   HENT:      Head: Normocephalic.      Right Ear: External ear normal.      Left Ear: External ear normal.      Nose: Congestion and rhinorrhea present.      Right Sinus: No maxillary sinus tenderness or frontal sinus tenderness.      Left Sinus: Maxillary sinus tenderness present. No frontal sinus tenderness.      Mouth/Throat:      Mouth: Mucous membranes are moist.   Eyes:      General: No scleral icterus.        Right eye: No discharge.         Left eye: No discharge.      Extraocular Movements: Extraocular movements intact.   Cardiovascular:      Rate and Rhythm: Normal rate and regular rhythm.      Pulses: Normal pulses.      Heart sounds: No murmur heard.     No gallop.   Pulmonary:      Effort: Pulmonary effort is normal. No respiratory distress.      Breath sounds: Normal breath sounds. No wheezing, rhonchi or rales.   Chest:      Chest wall: " Tenderness present.   Abdominal:      General: Abdomen is flat. There is no distension.      Tenderness: There is no abdominal tenderness.   Musculoskeletal:      Cervical back: No tenderness.      Right lower leg: No edema.      Left lower leg: No edema.   Lymphadenopathy:      Cervical: No cervical adenopathy.   Skin:     General: Skin is warm and dry.      Coloration: Skin is not jaundiced.   Neurological:      General: No focal deficit present.      Mental Status: He is alert and oriented to person, place, and time.   Psychiatric:         Mood and Affect: Mood normal.         Behavior: Behavior normal.

## 2025-03-05 NOTE — ASSESSMENT & PLAN NOTE
Lab Results   Component Value Date    HGBA1C 7.1 (H) 01/16/2025     Continue with metformin 500 mg BID. Will repeat A1c at his next visit.

## 2025-03-05 NOTE — ASSESSMENT & PLAN NOTE
Possible exacerbation of prior asthma in setting of postnasal drip. He does also note some shortness of breath. Will treat with albuterol inhaler.

## 2025-03-06 ENCOUNTER — TELEPHONE (OUTPATIENT)
Dept: INTERNAL MEDICINE CLINIC | Facility: OTHER | Age: 50
End: 2025-03-06

## 2025-03-06 RX ORDER — FLUNISOLIDE 0.25 MG/ML
SOLUTION NASAL
Refills: 0 | OUTPATIENT
Start: 2025-03-06

## 2025-03-06 NOTE — TELEPHONE ENCOUNTER
PA for fluticasone (FLONASE) 50 mcg/act nasal spray SUBMITTED to [facility]    via    []CMM-KEY:   [x]Surescripts-Case ID # [id number]   []Availity-Auth ID # NDC #   []Faxed to plan   []Other website   []Phone call Case ID #     [x]PA sent as URGENT    All office notes, labs and other pertaining documents and studies sent. Clinical questions answered. Awaiting determination from insurance company.     Turnaround time for your insurance to make a decision on your Prior Authorization can take 7-21 business days.

## 2025-03-06 NOTE — TELEPHONE ENCOUNTER
PA for fluticasone (FLONASE) 50 mcg/act nasal spray EXCLUDED from plan       Reason:(Screenshot if applicable)        Message sent to office clinical pool Yes

## 2025-03-23 DIAGNOSIS — M48.02 SPINAL STENOSIS OF CERVICAL REGION: ICD-10-CM

## 2025-03-23 DIAGNOSIS — J45.20 MILD INTERMITTENT EXTRINSIC ASTHMA WITHOUT COMPLICATION: ICD-10-CM

## 2025-03-25 RX ORDER — MONTELUKAST SODIUM 10 MG/1
10 TABLET ORAL
Qty: 100 TABLET | Refills: 0 | Status: SHIPPED | OUTPATIENT
Start: 2025-03-25

## 2025-03-25 RX ORDER — TRAMADOL HYDROCHLORIDE 50 MG/1
50 TABLET ORAL 2 TIMES DAILY PRN
Qty: 60 TABLET | Refills: 0 | Status: SHIPPED | OUTPATIENT
Start: 2025-03-25

## 2025-04-25 ENCOUNTER — TELEPHONE (OUTPATIENT)
Age: 50
End: 2025-04-25
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
## 2025-04-29 ENCOUNTER — OFFICE VISIT (OUTPATIENT)
Dept: INTERNAL MEDICINE CLINIC | Facility: OTHER | Age: 50
End: 2025-04-29
Payer: COMMERCIAL

## 2025-04-29 VITALS
TEMPERATURE: 98.4 F | SYSTOLIC BLOOD PRESSURE: 126 MMHG | WEIGHT: 210 LBS | DIASTOLIC BLOOD PRESSURE: 84 MMHG | HEIGHT: 68 IN | HEART RATE: 96 BPM | BODY MASS INDEX: 31.83 KG/M2 | OXYGEN SATURATION: 97 %
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
## 2025-04-29 DIAGNOSIS — S06.0X0A CONCUSSION WITHOUT LOSS OF CONSCIOUSNESS, INITIAL ENCOUNTER: ICD-10-CM

## 2025-04-29 DIAGNOSIS — M48.02 SPINAL STENOSIS OF CERVICAL REGION: Primary | ICD-10-CM

## 2025-04-29 PROCEDURE — 99214 OFFICE O/P EST MOD 30 MIN: CPT

## 2025-04-29 RX ORDER — TRAMADOL HYDROCHLORIDE 50 MG/1
50 TABLET ORAL 2 TIMES DAILY PRN
Qty: 60 TABLET | Refills: 0 | Status: SHIPPED | OUTPATIENT
Start: 2025-04-29

## 2025-04-29 RX ORDER — CYCLOBENZAPRINE HCL 10 MG
10 TABLET ORAL
Qty: 30 TABLET | Refills: 1 | Status: SHIPPED | OUTPATIENT
Start: 2025-04-29

## 2025-04-29 NOTE — LETTER
April 29, 2025     Patient: Theron Rendon  YOB: 1975  Date of Visit: 4/29/2025      To Whom it May Concern:    Theron Rendon is under my professional care. Theron was seen in my office on 4/29/2025. Theron may return to work on 5/13/25 .    If you have any questions or concerns, please don't hesitate to call.         Sincerely,          Melina Farris PA-C        CC: No Recipients
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
## 2025-04-29 NOTE — PROGRESS NOTES
Diabetic Foot Exam    Patient's shoes and socks removed.    Right Foot/Ankle   Right Foot Inspection  Skin Exam: skin intact and dry skin. No warmth, no callus, no erythema, no maceration, no abnormal color, no pre-ulcer, no ulcer and no callus.     Toe Exam: No swelling, no tenderness, erythema and  no right toe deformity    Sensory   Monofilament testing: intact    Left Foot/Ankle  Left Foot Inspection  Skin Exam: skin intact and dry skin. No warmth, no erythema, no maceration, normal color, no pre-ulcer, no ulcer and no callus.     Toe Exam: No swelling, no tenderness, no erythema and no left toe deformity.     Sensory   Monofilament testing: intact    Assign Risk Category  No deformity present  No loss of protective sensation  {Diabetic Foot Exam Documentation Incomplete}

## 2025-04-30 ENCOUNTER — TELEPHONE (OUTPATIENT)
Age: 50
End: 2025-04-30

## 2025-04-30 NOTE — TELEPHONE ENCOUNTER
Please send Claim /bill for patient's yesterday appointment with Melina to   [address] [facility]  [phone]  [place] [address]    Any questions please reach out to Antonella from [employer] at [phone]    Thank you!!
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
## 2025-05-06 ENCOUNTER — EVALUATION (OUTPATIENT)
Dept: PHYSICAL THERAPY | Facility: REHABILITATION | Age: 50
End: 2025-05-06
Payer: COMMERCIAL

## 2025-05-06 DIAGNOSIS — S06.0X0D CONCUSSION WITHOUT LOSS OF CONSCIOUSNESS, SUBSEQUENT ENCOUNTER: ICD-10-CM

## 2025-05-06 DIAGNOSIS — M54.12 CERVICAL RADICULOPATHY: Primary | ICD-10-CM

## 2025-05-06 PROCEDURE — 97112 NEUROMUSCULAR REEDUCATION: CPT | Performed by: PHYSICAL THERAPIST

## 2025-05-06 PROCEDURE — 97163 PT EVAL HIGH COMPLEX 45 MIN: CPT | Performed by: PHYSICAL THERAPIST

## 2025-05-13 ENCOUNTER — OFFICE VISIT (OUTPATIENT)
Dept: PHYSICAL THERAPY | Facility: REHABILITATION | Age: 50
End: 2025-05-13
Payer: COMMERCIAL

## 2025-05-13 DIAGNOSIS — S06.0X0D CONCUSSION WITHOUT LOSS OF CONSCIOUSNESS, SUBSEQUENT ENCOUNTER: ICD-10-CM

## 2025-05-13 DIAGNOSIS — M54.12 CERVICAL RADICULOPATHY: Primary | ICD-10-CM

## 2025-05-13 DIAGNOSIS — S06.0X0A CONCUSSION WITHOUT LOSS OF CONSCIOUSNESS, INITIAL ENCOUNTER: ICD-10-CM

## 2025-05-13 PROCEDURE — 97110 THERAPEUTIC EXERCISES: CPT | Performed by: PHYSICAL THERAPIST

## 2025-05-13 PROCEDURE — 97112 NEUROMUSCULAR REEDUCATION: CPT | Performed by: PHYSICAL THERAPIST

## 2025-05-13 PROCEDURE — 97140 MANUAL THERAPY 1/> REGIONS: CPT | Performed by: PHYSICAL THERAPIST

## 2025-05-13 NOTE — PROGRESS NOTES
Daily Note     Today's date: 2025  Patient name: Theron Rendon  : 1975  MRN: [id number]  Referring provider: Melina Farris PA-C  Dx:   Encounter Diagnosis     ICD-10-CM    1. Cervical radiculopathy  M54.12       2. Concussion without loss of consciousness, initial encounter  S06.0X0A Ambulatory Referral to Comprehensive Concussion Program      3. Concussion without loss of consciousness, subsequent encounter  S06.0X0D                      Subjective: Pt reports that he tried the vertical saccade activity but it gave him a headache.  He notes that his neck is moving better. He continues to have difficulty with memory.        Objective: See treatment diary below      Assessment: Tolerated treatment well. Moderate spasm noted cervical paraspinals, suboccipitals and proximal scalenes.  This reduced with STM.  Pt was able to do slightly diagonal saccades without significant symptoms.  He was advised to try this at home daily along with the card sorting activity to help with memory.  Patient would benefit from continued PT      Plan: Continue per plan of care.      Precautions:   Patient Active Problem List   Diagnosis    History of pulmonary embolus (PE)    Gastroesophageal reflux disease without esophagitis    Classic migraine    Essential hypertension    Extrinsic asthma    Hepatic steatosis    Generalized anxiety disorder    Lupus anticoagulant positive    BHARGAV (obstructive sleep apnea)    Vitamin D deficiency    Prepatellar bursitis    Actinic keratosis    Continuous opioid dependence (HCC)    Tinea corporis    Uncontrolled type 2 diabetes mellitus with hyperglycemia (HCC)    Pelvic pain in male    Pain in left testicle    Cervical radiculopathy    Spinal stenosis of cervical region    Pain of neck with recent traumatic injury    Weight gain    BMI 34.0-34.9,adult         Daily Treatment Diary     Assessment            Visit number 1            Eval/Reval claude            FOTO     **     **   HEP  "Issued  x            Manuals             STM cerv  NT           SOR NT NT                                     Exercise Diary              Upper cerv SNAGs w/right rot 2x5 2x5           UT stretch NV 5\"2x5           Scapular retr NV 5\"x10           Cerv ret ?NV Supine 5\"x5                                                               saccades Vert x30 hold           diag HEP 1x10 ea           VOR x1                                                    HEP card sorting   Instr. given                        Modalities             MH  8'             Access Code: [email or web address]  URL: [email or web address]  Date: 05/13/2025  Prepared by: Pema Anderson    Exercises  - Seated Assisted Cervical Rotation with Towel  - 3 x daily - 7 x weekly - 2 sets - 5 reps - 2 sec hold  - Seated Vertical Saccades  - 1 x daily - 7 x weekly - 1 sets - 30 reps  - Seated Diagonal Saccades  - 1 x daily - 7 x weekly - 1 sets - 30 reps  - Seated Cervical Sidebending AROM  - 2 x daily - 7 x weekly - 1-2 sets - 5 reps - 5 sec hold  - Standing Scapular Retraction  - 2 x daily - 7 x weekly - 13 sets - 10 reps - 5 sec hold                "

## 2025-05-15 ENCOUNTER — OFFICE VISIT (OUTPATIENT)
Dept: PHYSICAL THERAPY | Facility: REHABILITATION | Age: 50
End: 2025-05-15
Payer: COMMERCIAL

## 2025-05-15 DIAGNOSIS — S06.0X0A CONCUSSION WITHOUT LOSS OF CONSCIOUSNESS, INITIAL ENCOUNTER: Primary | ICD-10-CM

## 2025-05-15 DIAGNOSIS — M54.12 CERVICAL RADICULOPATHY: ICD-10-CM

## 2025-05-15 DIAGNOSIS — S06.0X0D CONCUSSION WITHOUT LOSS OF CONSCIOUSNESS, SUBSEQUENT ENCOUNTER: ICD-10-CM

## 2025-05-15 PROCEDURE — 97140 MANUAL THERAPY 1/> REGIONS: CPT

## 2025-05-15 PROCEDURE — 97110 THERAPEUTIC EXERCISES: CPT

## 2025-05-15 PROCEDURE — 97112 NEUROMUSCULAR REEDUCATION: CPT

## 2025-05-15 NOTE — PROGRESS NOTES
Daily Note     Today's date: 5/15/2025  Patient name: Theron Rendon  : 1975  MRN: [id number]  Referring provider: Melina Farris PA-C  Dx:   Encounter Diagnosis     ICD-10-CM    1. Concussion without loss of consciousness, initial encounter  S06.0X0A       2. Cervical radiculopathy  M54.12       3. Concussion without loss of consciousness, subsequent encounter  S06.0X0D           Start Time: 730  Stop Time: 808  Total time in clinic (min): 38 minutes    Subjective: Patient states he woke up with a headache today.       Objective: See treatment diary below      Assessment: Fair tolerance to session. Manuals performed today without increased pain. Patient had increased symptoms and difficulty with saccades on a diagonal but was able to perform horizontally without difficulty. Balance assessed and no difficulties noted. Pt will benefit from continued skilled PT intervention in order to address remaining limitations and to restore maximal function.         Plan: Continue per plan of care.      Precautions:   Patient Active Problem List   Diagnosis    History of pulmonary embolus (PE)    Gastroesophageal reflux disease without esophagitis    Classic migraine    Essential hypertension    Extrinsic asthma    Hepatic steatosis    Generalized anxiety disorder    Lupus anticoagulant positive    BHARGAV (obstructive sleep apnea)    Vitamin D deficiency    Prepatellar bursitis    Actinic keratosis    Continuous opioid dependence (HCC)    Tinea corporis    Uncontrolled type 2 diabetes mellitus with hyperglycemia (HCC)    Pelvic pain in male    Pain in left testicle    Cervical radiculopathy    Spinal stenosis of cervical region    Pain of neck with recent traumatic injury    Weight gain    BMI 34.0-34.9,adult         Daily Treatment Diary     Assessment 5/6 5/13 5/15          Visit number 1 2 3          Eval/Reval x            FOTO     **     **   HEP Issued  x            Manuals             STM cerv  NT JG          SOR  "NT NT JG                                    Exercise Diary              Upper cerv SNAGs w/right rot 2x5 2x5 2x5          UT stretch NV 5\"2x5 10\"x5          Scapular retr NV 5\"x10 5\"x20           Cerv ret ?NV Supine 5\"x5 Supine 5\" x10                                                              saccades Vert x30 hold           diag HEP 1x10 ea 15\" x1 each symptoms inc          VOR x1   Horizotal only 30\"                          Convergence x10                        HEP card sorting   Instr. given                        Modalities               8' 8'            Access Code: [email or web address]  URL: [email or web address]  Date: 05/13/2025  Prepared by: Pema Anderson    Exercises  - Seated Assisted Cervical Rotation with Towel  - 3 x daily - 7 x weekly - 2 sets - 5 reps - 2 sec hold  - Seated Vertical Saccades  - 1 x daily - 7 x weekly - 1 sets - 30 reps  - Seated Diagonal Saccades  - 1 x daily - 7 x weekly - 1 sets - 30 reps  - Seated Cervical Sidebending AROM  - 2 x daily - 7 x weekly - 1-2 sets - 5 reps - 5 sec hold  - Standing Scapular Retraction  - 2 x daily - 7 x weekly - 13 sets - 10 reps - 5 sec hold                  "

## 2025-05-20 ENCOUNTER — APPOINTMENT (OUTPATIENT)
Dept: PHYSICAL THERAPY | Facility: REHABILITATION | Age: 50
End: 2025-05-20
Payer: COMMERCIAL

## 2025-05-20 NOTE — PROGRESS NOTES
"Daily Note     Today's date: 2025  Patient name: Theron Rendon  : 1975  MRN: [id number]  Referring provider: Melina Farris PA-C  Dx:   No diagnosis found.                 Subjective: Patient states he woke up with a headache today.       Objective: See treatment diary below      Assessment: Fair tolerance to session. Manuals performed today without increased pain. Patient had increased symptoms and difficulty with saccades on a diagonal but was able to perform horizontally without difficulty. Balance assessed and no difficulties noted. Pt will benefit from continued skilled PT intervention in order to address remaining limitations and to restore maximal function.         Plan: Continue per plan of care.      Precautions:   Patient Active Problem List   Diagnosis    History of pulmonary embolus (PE)    Gastroesophageal reflux disease without esophagitis    Classic migraine    Essential hypertension    Extrinsic asthma    Hepatic steatosis    Generalized anxiety disorder    Lupus anticoagulant positive    BHARGAV (obstructive sleep apnea)    Vitamin D deficiency    Prepatellar bursitis    Actinic keratosis    Continuous opioid dependence (HCC)    Tinea corporis    Uncontrolled type 2 diabetes mellitus with hyperglycemia (HCC)    Pelvic pain in male    Pain in left testicle    Cervical radiculopathy    Spinal stenosis of cervical region    Pain of neck with recent traumatic injury    Weight gain    BMI 34.0-34.9,adult         Daily Treatment Diary     Assessment 5/6 5/13 5/15          Visit number 1 2 3          Eval/Reval x            FOTO     **     **   HEP Issued  x            Manuals             STM cerv  NT JG          SOR NT NT JG                                    Exercise Diary              Upper cerv SNAGs w/right rot 2x5 2x5 2x5          UT stretch NV 5\"2x5 10\"x5          Scapular retr NV 5\"x10 5\"x20           Cerv ret ?NV Supine 5\"x5 Supine 5\" x10                                               " "               saccades Vert x30 hold           diag HEP 1x10 ea 15\" x1 each symptoms inc          VOR x1   Horizotal only 30\"                          Convergence x10                        HEP card sorting   Instr. given                        Modalities               8' 8'            Access Code: [email or web address]  URL: [email or web address]  Date: 05/13/2025  Prepared by: Pema Anderson    Exercises  - Seated Assisted Cervical Rotation with Towel  - 3 x daily - 7 x weekly - 2 sets - 5 reps - 2 sec hold  - Seated Vertical Saccades  - 1 x daily - 7 x weekly - 1 sets - 30 reps  - Seated Diagonal Saccades  - 1 x daily - 7 x weekly - 1 sets - 30 reps  - Seated Cervical Sidebending AROM  - 2 x daily - 7 x weekly - 1-2 sets - 5 reps - 5 sec hold  - Standing Scapular Retraction  - 2 x daily - 7 x weekly - 13 sets - 10 reps - 5 sec hold                  "

## 2025-05-22 ENCOUNTER — OFFICE VISIT (OUTPATIENT)
Dept: PHYSICAL THERAPY | Facility: REHABILITATION | Age: 50
End: 2025-05-22
Payer: COMMERCIAL

## 2025-05-22 DIAGNOSIS — S06.0X0A CONCUSSION WITHOUT LOSS OF CONSCIOUSNESS, INITIAL ENCOUNTER: Primary | ICD-10-CM

## 2025-05-22 DIAGNOSIS — M54.12 CERVICAL RADICULOPATHY: ICD-10-CM

## 2025-05-22 DIAGNOSIS — S06.0X0D CONCUSSION WITHOUT LOSS OF CONSCIOUSNESS, SUBSEQUENT ENCOUNTER: ICD-10-CM

## 2025-05-22 PROCEDURE — 97110 THERAPEUTIC EXERCISES: CPT | Performed by: PHYSICAL THERAPIST

## 2025-05-22 PROCEDURE — 97112 NEUROMUSCULAR REEDUCATION: CPT | Performed by: PHYSICAL THERAPIST

## 2025-05-22 PROCEDURE — 97140 MANUAL THERAPY 1/> REGIONS: CPT | Performed by: PHYSICAL THERAPIST

## 2025-05-22 NOTE — PROGRESS NOTES
Daily Note     Today's date: 2025  Patient name: Theron Rendon  : 1975  MRN: [id number]  Referring provider: Melina Farris PA-C  Dx:   Encounter Diagnosis     ICD-10-CM    1. Concussion without loss of consciousness, initial encounter  S06.0X0A       2. Cervical radiculopathy  M54.12       3. Concussion without loss of consciousness, subsequent encounter  S06.0X0D                        Subjective: patient reports that he is having a bad week.  He has been driving and on the phone a lot and working late resulting in decreased sleep.  His headaches have been worse and he has not had time to work on his exercises.        Objective: See treatment diary below      Assessment: Fair tolerance to session. ROM is improving.  He had a very busy week at work and this has led to increased pain and difficulty doing the visual exercises. Pt advised to take visual breaks during the day to facilitate healing.  Pt will benefit from continued skilled PT intervention in order to address remaining limitations and to restore maximal function.         Plan: Continue per plan of care.      Precautions:   Patient Active Problem List   Diagnosis    History of pulmonary embolus (PE)    Gastroesophageal reflux disease without esophagitis    Classic migraine    Essential hypertension    Extrinsic asthma    Hepatic steatosis    Generalized anxiety disorder    Lupus anticoagulant positive    BHARGAV (obstructive sleep apnea)    Vitamin D deficiency    Prepatellar bursitis    Actinic keratosis    Continuous opioid dependence (HCC)    Tinea corporis    Uncontrolled type 2 diabetes mellitus with hyperglycemia (HCC)    Pelvic pain in male    Pain in left testicle    Cervical radiculopathy    Spinal stenosis of cervical region    Pain of neck with recent traumatic injury    Weight gain    BMI 34.0-34.9,adult         Daily Treatment Diary     Assessment 5/6 5/13 5/15 5/22         Visit number 1 2 3          Eval/Reval x            FOTO    "  **     **   HEP Issued  x            Manuals             STM cerv  NT JG NT         SOR NT NT JG NT                                   Exercise Diary              Upper cerv SNAGs w/right rot 2x5 2x5 2x5 D/C         UT stretch NV 5\"2x5 10\"x5 10\"x5         Scapular retr NV 5\"x10 5\"x20  5\"x20         Cerv ret ?NV Supine 5\"x5 Supine 5\" x10 Seated x10         Levator stretch    10\"x5                                                saccades Vert x30 hold           diag HEP 1x10 ea 15\" x1 each symptoms inc 17\" x2 ea until sympt.          VOR x1   Horizotal only 30\"                          Convergence x10  Convergence x10                      HEP card sorting   Instr. given                        Modalities               8' 8' 8'           Access Code: [email or web address]  URL: [email or web address]  Date: 05/13/2025  Prepared by: Pema Anderson    Exercises  - Seated Assisted Cervical Rotation with Towel  - 3 x daily - 7 x weekly - 2 sets - 5 reps - 2 sec hold  - Seated Vertical Saccades  - 1 x daily - 7 x weekly - 1 sets - 30 reps  - Seated Diagonal Saccades  - 1 x daily - 7 x weekly - 1 sets - 30 reps  - Seated Cervical Sidebending AROM  - 2 x daily - 7 x weekly - 1-2 sets - 5 reps - 5 sec hold  - Standing Scapular Retraction  - 2 x daily - 7 x weekly - 13 sets - 10 reps - 5 sec hold                  "

## 2025-05-27 ENCOUNTER — OFFICE VISIT (OUTPATIENT)
Dept: PHYSICAL THERAPY | Facility: REHABILITATION | Age: 50
End: 2025-05-27
Payer: COMMERCIAL

## 2025-05-27 DIAGNOSIS — S06.0X0A CONCUSSION WITHOUT LOSS OF CONSCIOUSNESS, INITIAL ENCOUNTER: Primary | ICD-10-CM

## 2025-05-27 DIAGNOSIS — M54.12 CERVICAL RADICULOPATHY: ICD-10-CM

## 2025-05-27 DIAGNOSIS — S06.0X0D CONCUSSION WITHOUT LOSS OF CONSCIOUSNESS, SUBSEQUENT ENCOUNTER: ICD-10-CM

## 2025-05-27 PROCEDURE — 97110 THERAPEUTIC EXERCISES: CPT

## 2025-05-27 PROCEDURE — 97112 NEUROMUSCULAR REEDUCATION: CPT

## 2025-05-27 PROCEDURE — 97140 MANUAL THERAPY 1/> REGIONS: CPT

## 2025-05-27 NOTE — PROGRESS NOTES
Daily Note     Today's date: 2025  Patient name: Theron Rendon  : 1975  MRN: [id number]  Referring provider: Melina Farris PA-C  Dx:   Encounter Diagnosis     ICD-10-CM    1. Concussion without loss of consciousness, initial encounter  S06.0X0A       2. Cervical radiculopathy  M54.12       3. Concussion without loss of consciousness, subsequent encounter  S06.0X0D           Start Time: 730  Stop Time: 815  Total time in clinic (min): 45 minutes    Subjective: Patient states he has a headache today but not as bad as last visit.       Objective: See treatment diary below      Assessment: Patient tolerated treatment well. Improved time on diagonal VOR noted today. Patient also able to ambulate with horizontal head turns today without complaints or difficulty. Pt will benefit from continued skilled PT intervention in order to address remaining limitations and to restore maximal function.   Patient reports no increase headache or symptoms post treatment.       Plan: Continue per plan of care.      Precautions:   Patient Active Problem List   Diagnosis    History of pulmonary embolus (PE)    Gastroesophageal reflux disease without esophagitis    Classic migraine    Essential hypertension    Extrinsic asthma    Hepatic steatosis    Generalized anxiety disorder    Lupus anticoagulant positive    BHARGAV (obstructive sleep apnea)    Vitamin D deficiency    Prepatellar bursitis    Actinic keratosis    Continuous opioid dependence (HCC)    Tinea corporis    Uncontrolled type 2 diabetes mellitus with hyperglycemia (HCC)    Pelvic pain in male    Pain in left testicle    Cervical radiculopathy    Spinal stenosis of cervical region    Pain of neck with recent traumatic injury    Weight gain    BMI 34.0-34.9,adult         Daily Treatment Diary     Assessment 5/6 5/13 5/15 5/22 5/27        Visit number 1 2 3 4 5        Eval/Reval x            FOTO     **     **   HEP Issued  x            Manuals             STM cerv   "NT JG NT JG        SOR NT NT JG NT JG                                  Exercise Diary              Upper cerv SNAGs w/right rot 2x5 2x5 2x5 D/C         UT stretch NV 5\"2x5 10\"x5 10\"x5 10\"x5        Scapular retr NV 5\"x10 5\"x20  5\"x20 Rows BTB 5\" x20        Cerv ret ?NV Supine 5\"x5 Supine 5\" x10 Seated x10 Supine 5\" x15        Levator stretch    10\"x5 10\"x5                          Corner pec stretch 15\"x4                     saccades Vert x30 hold           diag HEP 1x10 ea 15\" x1 each symptoms inc 17\" x2 ea until sympt.  30\" x2 ea until sympt.         VOR x1   Horizotal only 30\"               Ambulation with horizontal HT 3 laps           Convergence x10  Convergence x10 Convergence x10                     HEP card sorting   Instr. given                        Modalities               8' 8' 8' 8'          Access Code: [email or web address]  URL: [email or web address]  Date: 05/13/2025  Prepared by: Pema Anderson    Exercises  - Seated Assisted Cervical Rotation with Towel  - 3 x daily - 7 x weekly - 2 sets - 5 reps - 2 sec hold  - Seated Vertical Saccades  - 1 x daily - 7 x weekly - 1 sets - 30 reps  - Seated Diagonal Saccades  - 1 x daily - 7 x weekly - 1 sets - 30 reps  - Seated Cervical Sidebending AROM  - 2 x daily - 7 x weekly - 1-2 sets - 5 reps - 5 sec hold  - Standing Scapular Retraction  - 2 x daily - 7 x weekly - 13 sets - 10 reps - 5 sec hold                    "

## 2025-05-28 DIAGNOSIS — K21.9 GASTROESOPHAGEAL REFLUX DISEASE WITHOUT ESOPHAGITIS: ICD-10-CM

## 2025-05-28 DIAGNOSIS — F41.9 ANXIETY: ICD-10-CM

## 2025-05-29 ENCOUNTER — OFFICE VISIT (OUTPATIENT)
Dept: PHYSICAL THERAPY | Facility: REHABILITATION | Age: 50
End: 2025-05-29
Payer: COMMERCIAL

## 2025-05-29 DIAGNOSIS — S06.0X0D CONCUSSION WITHOUT LOSS OF CONSCIOUSNESS, SUBSEQUENT ENCOUNTER: ICD-10-CM

## 2025-05-29 DIAGNOSIS — S06.0X0A CONCUSSION WITHOUT LOSS OF CONSCIOUSNESS, INITIAL ENCOUNTER: Primary | ICD-10-CM

## 2025-05-29 DIAGNOSIS — M54.12 CERVICAL RADICULOPATHY: ICD-10-CM

## 2025-05-29 PROCEDURE — 97140 MANUAL THERAPY 1/> REGIONS: CPT | Performed by: PHYSICAL THERAPIST

## 2025-05-29 PROCEDURE — 97110 THERAPEUTIC EXERCISES: CPT | Performed by: PHYSICAL THERAPIST

## 2025-05-29 PROCEDURE — 97112 NEUROMUSCULAR REEDUCATION: CPT | Performed by: PHYSICAL THERAPIST

## 2025-05-29 RX ORDER — PANTOPRAZOLE SODIUM 40 MG/1
40 TABLET, DELAYED RELEASE ORAL DAILY
Qty: 90 TABLET | Refills: 1 | Status: SHIPPED | OUTPATIENT
Start: 2025-05-29

## 2025-05-29 RX ORDER — VENLAFAXINE HYDROCHLORIDE 150 MG/1
300 CAPSULE, EXTENDED RELEASE ORAL DAILY
Qty: 180 CAPSULE | Refills: 1 | Status: SHIPPED | OUTPATIENT
Start: 2025-05-29

## 2025-05-29 NOTE — PROGRESS NOTES
Daily Note     Today's date: 2025  Patient name: Theron Rendon  : 1975  MRN: [id number]  Referring provider: Melina Farris PA-C  Dx:   Encounter Diagnosis     ICD-10-CM    1. Concussion without loss of consciousness, initial encounter  S06.0X0A       2. Cervical radiculopathy  M54.12       3. Concussion without loss of consciousness, subsequent encounter  S06.0X0D                        Subjective: Patient states he has a headache today but not as bad as last visit. He noted that walking with head turns side to side was asymptomatic but with up and down he felt mild symptoms.  He noted that if he did not look as high it was better.        Objective: See treatment diary below  JPE test: R 3 cm  L 6 cm    Assessment: Patient tolerated treatment well. Mild impairment in cervical proprioception to the left.  He is able to do saccades vertically if he does not look above eye level.  This caused only mild symptoms.  He was advised to try vertical saccades this way at home.  Pt will benefit from continued skilled PT intervention in order to address remaining limitations and to restore maximal function.   Patient reports no increase headache or symptoms post treatment.       Plan: Continue per plan of care.      Precautions:   Patient Active Problem List   Diagnosis    History of pulmonary embolus (PE)    Gastroesophageal reflux disease without esophagitis    Classic migraine    Essential hypertension    Extrinsic asthma    Hepatic steatosis    Generalized anxiety disorder    Lupus anticoagulant positive    BHARGAV (obstructive sleep apnea)    Vitamin D deficiency    Prepatellar bursitis    Actinic keratosis    Continuous opioid dependence (HCC)    Tinea corporis    Uncontrolled type 2 diabetes mellitus with hyperglycemia (HCC)    Pelvic pain in male    Pain in left testicle    Cervical radiculopathy    Spinal stenosis of cervical region    Pain of neck with recent traumatic injury    Weight gain    BMI  "34.0-34.9,adult         Daily Treatment Diary     Assessment 5/6 5/13 5/15 5/22 5/27 5/25       Visit number 1 2 3 4 5 6       Eval/Reval x            FOTO     **     **   HEP Issued  x            Manuals             STM cerv  NT JG NT JG NT       SOR NT NT JG NT JG NT       PA grade 2,4 C6,7,T1      NT                    Exercise Diary              Upper cerv SNAGs w/right rot 2x5 2x5 2x5 D/C         UT stretch NV 5\"2x5 10\"x5 10\"x5 10\"x5 10\"x5       Scapular retr NV 5\"x10 5\"x20  5\"x20 Rows BTB 5\" x20 BTB 5\"x20       Cerv ret ?NV Supine 5\"x5 Supine 5\" x10 Seated x10 Supine 5\" x15 Supine 5\"x15       Levator stretch    10\"x5 10\"x5 10\"x5       JPE L      1x10       Corner pec stretch      15\"x4 20\"x4       Thoracic stretch over the back of the chair      5\"x10       saccades Vert x30 hold           diag HEP 1x10 ea 15\" x1 each symptoms inc 17\" x2 ea until sympt.  30\" x2 ea until sympt.         VOR x1   Horizotal only 30\"          Ambulation with horizontal HT      3 laps           Convergence x10  Convergence x10 Convergence x10                     HEP card sorting   Instr. given                        Modalities               8' 8' 8' 8' 8'         Access Code: [email or web address]  URL: [email or web address]  Date: 05/13/2025  Prepared by: Pema Anderson    Exercises  - Seated Assisted Cervical Rotation with Towel  - 3 x daily - 7 x weekly - 2 sets - 5 reps - 2 sec hold  - Seated Vertical Saccades  - 1 x daily - 7 x weekly - 1 sets - 30 reps  - Seated Diagonal Saccades  - 1 x daily - 7 x weekly - 1 sets - 30 reps  - Seated Cervical Sidebending AROM  - 2 x daily - 7 x weekly - 1-2 sets - 5 reps - 5 sec hold  - Standing Scapular Retraction  - 2 x daily - 7 x weekly - 13 sets - 10 reps - 5 sec hold                    "

## 2025-06-02 DIAGNOSIS — M48.02 SPINAL STENOSIS OF CERVICAL REGION: ICD-10-CM

## 2025-06-03 ENCOUNTER — OFFICE VISIT (OUTPATIENT)
Dept: PHYSICAL THERAPY | Facility: REHABILITATION | Age: 50
End: 2025-06-03
Payer: COMMERCIAL

## 2025-06-03 DIAGNOSIS — S06.0X0D CONCUSSION WITHOUT LOSS OF CONSCIOUSNESS, SUBSEQUENT ENCOUNTER: ICD-10-CM

## 2025-06-03 DIAGNOSIS — S06.0X0A CONCUSSION WITHOUT LOSS OF CONSCIOUSNESS, INITIAL ENCOUNTER: Primary | ICD-10-CM

## 2025-06-03 DIAGNOSIS — M54.12 CERVICAL RADICULOPATHY: ICD-10-CM

## 2025-06-03 PROCEDURE — 97110 THERAPEUTIC EXERCISES: CPT

## 2025-06-03 PROCEDURE — 97140 MANUAL THERAPY 1/> REGIONS: CPT

## 2025-06-03 PROCEDURE — 97112 NEUROMUSCULAR REEDUCATION: CPT

## 2025-06-03 RX ORDER — TRAMADOL HYDROCHLORIDE 50 MG/1
50 TABLET ORAL 2 TIMES DAILY PRN
Qty: 60 TABLET | Refills: 0 | Status: SHIPPED | OUTPATIENT
Start: 2025-06-03

## 2025-06-03 NOTE — PROGRESS NOTES
Daily Note     Today's date: 6/3/2025  Patient name: Theron Rendon  : 1975  MRN: [id number]  Referring provider: Melina Farris PA-C  Dx:   Encounter Diagnosis     ICD-10-CM    1. Concussion without loss of consciousness, initial encounter  S06.0X0A       2. Cervical radiculopathy  M54.12       3. Concussion without loss of consciousness, subsequent encounter  S06.0X0D         Start Time: 736  Stop Time: 817  Total time in clinic (min): 41 minutes    Subjective: Patient reports that he has no changes in concussion symptoms and that his neck is sore today.       Objective: See treatment diary below      Assessment: Patient tolerated treatment well. With Pt's report of increased CTJ tension and soreness, added additional thoracic mobility exercises and focused on parascapular strengthening. Pt did well w/ new exercises. Pt will benefit from continued skilled PT intervention in order to address remaining limitations and to restore maximal function.   Patient reports no increase headache or symptoms post treatment.       Plan: Continue per plan of care.      Precautions:   Patient Active Problem List   Diagnosis    History of pulmonary embolus (PE)    Gastroesophageal reflux disease without esophagitis    Classic migraine    Essential hypertension    Extrinsic asthma    Hepatic steatosis    Generalized anxiety disorder    Lupus anticoagulant positive    BHARGAV (obstructive sleep apnea)    Vitamin D deficiency    Prepatellar bursitis    Actinic keratosis    Continuous opioid dependence (HCC)    Tinea corporis    Uncontrolled type 2 diabetes mellitus with hyperglycemia (HCC)    Pelvic pain in male    Pain in left testicle    Cervical radiculopathy    Spinal stenosis of cervical region    Pain of neck with recent traumatic injury    Weight gain    BMI 34.0-34.9,adult         Daily Treatment Diary     Assessment 5/6 5/13 5/15 5/22 5/27 5/25 6/3      Visit number 1 2 3 4 5 6 7      Eval/Reval x            FOTO      "**     **   HEP Issued  x            Manuals             STM cerv  NT JG NT JG NT JMK      SOR NT NT JG NT JG NT       PA grade 2,4 C6,7,T1      NT JMK                   Exercise Diary              Upper cerv SNAGs w/right rot 2x5 2x5 2x5 D/C         UT stretch NV 5\"2x5 10\"x5 10\"x5 10\"x5 10\"x5       Scapular retr NV 5\"x10 5\"x20  5\"x20 Rows BTB 5\" x20 BTB 5\"x20 PTB 3x10 3\"- Neuo re      Shoulder extension       PTB 3x10 3\"- Neuro re      Cerv ret ?NV Supine 5\"x5 Supine 5\" x10 Seated x10 Supine 5\" x15 Supine 5\"x15       Levator stretch    10\"x5 10\"x5 10\"x5       JPE L      1x10       Corner pec stretch      15\"x4 20\"x4       Thoracic stretch over the back of the chair      5\"x10       saccades Vert x30 hold           diag HEP 1x10 ea 15\" x1 each symptoms inc 17\" x2 ea until sympt.  30\" x2 ea until sympt.         VOR x1   Horizotal only 30\"          Ambulation with horizontal HT      3 laps           Convergence x10  Convergence x10 Convergence x10        Thoracic circuit FR       5'      Open books       2x10 ea      HEP card sorting   Instr. given                        Modalities               8' 8' 8' 8' 8' 8'        Access Code: [email or web address]  URL: [email or web address]  Date: 05/13/2025  Prepared by: Pema Anderson    Exercises  - Seated Assisted Cervical Rotation with Towel  - 3 x daily - 7 x weekly - 2 sets - 5 reps - 2 sec hold  - Seated Vertical Saccades  - 1 x daily - 7 x weekly - 1 sets - 30 reps  - Seated Diagonal Saccades  - 1 x daily - 7 x weekly - 1 sets - 30 reps  - Seated Cervical Sidebending AROM  - 2 x daily - 7 x weekly - 1-2 sets - 5 reps - 5 sec hold  - Standing Scapular Retraction  - 2 x daily - 7 x weekly - 13 sets - 10 reps - 5 sec hold                    "

## 2025-06-05 ENCOUNTER — OFFICE VISIT (OUTPATIENT)
Dept: PHYSICAL THERAPY | Facility: REHABILITATION | Age: 50
End: 2025-06-05
Payer: COMMERCIAL

## 2025-06-05 DIAGNOSIS — M54.12 CERVICAL RADICULOPATHY: ICD-10-CM

## 2025-06-05 DIAGNOSIS — S06.0X0A CONCUSSION WITHOUT LOSS OF CONSCIOUSNESS, INITIAL ENCOUNTER: Primary | ICD-10-CM

## 2025-06-05 DIAGNOSIS — S06.0X0D CONCUSSION WITHOUT LOSS OF CONSCIOUSNESS, SUBSEQUENT ENCOUNTER: ICD-10-CM

## 2025-06-05 PROCEDURE — 97140 MANUAL THERAPY 1/> REGIONS: CPT

## 2025-06-05 PROCEDURE — 97110 THERAPEUTIC EXERCISES: CPT

## 2025-06-05 NOTE — PROGRESS NOTES
Daily Note     Today's date: 2025  Patient name: Theron Rendon  : 1975  MRN: [id number]  Referring provider: Melina Farris PA-C  Dx:   Encounter Diagnosis     ICD-10-CM    1. Concussion without loss of consciousness, initial encounter  S06.0X0A       2. Cervical radiculopathy  M54.12       3. Concussion without loss of consciousness, subsequent encounter  S06.0X0D         Start Time: 726  Stop Time: 815  Total time in clinic (min): 49 minutes    Subjective: Patient reports that he felt better following last session for the majority of his day. Pt reports radiating symptoms along the ulnar nerve path upon arrival this morning.       Objective: See treatment diary below      Assessment: Patient tolerated treatment well. Pt responded well to median and ulnar nerve glides as it centralized radicular symptoms. Repeated retraction and repeated retraction w/ extension was not successful as it peripheralized radicular symptoms. Pt was provided relief w/ thoracic P to A mobs at G 3-4. Advanced pt's parascapular strengthening w/o onset of radicular symptoms. Continue to focus on thoracic mobility as it is primary limitation. Pt will benefit from continued skilled PT intervention in order to address remaining limitations and to restore maximal function.   Patient reports no increase headache or symptoms post treatment.       Plan: Continue per plan of care.      Precautions:   Patient Active Problem List   Diagnosis    History of pulmonary embolus (PE)    Gastroesophageal reflux disease without esophagitis    Classic migraine    Essential hypertension    Extrinsic asthma    Hepatic steatosis    Generalized anxiety disorder    Lupus anticoagulant positive    BHARGAV (obstructive sleep apnea)    Vitamin D deficiency    Prepatellar bursitis    Actinic keratosis    Continuous opioid dependence (HCC)    Tinea corporis    Uncontrolled type 2 diabetes mellitus with hyperglycemia (HCC)    Pelvic pain in male    Pain in  "left testicle    Cervical radiculopathy    Spinal stenosis of cervical region    Pain of neck with recent traumatic injury    Weight gain    BMI 34.0-34.9,adult         Daily Treatment Diary     Assessment 5/6 5/13 5/15 5/22 5/27 5/25 6/3 6/5     Visit number 1 2 3 4 5 6 7 8     Eval/Reval x            FOTO     **     **   HEP Issued  x            Manuals             STM cerv  NT JG NT JG NT JMK      SOR NT NT JG NT JG NT       PA grade 2,4 C6,7,T1      NT JMK JMK G 3-4     Median and Ulnar nerve glide        JMK     Distraction retraction extension off table        JMK     Exercise Diary              UE bike        3'/3'     Upper cerv SNAGs w/right rot 2x5 2x5 2x5 D/C         UT stretch NV 5\"2x5 10\"x5 10\"x5 10\"x5 10\"x5       Scapular retr NV 5\"x10 5\"x20  5\"x20 Rows BTB 5\" x20 BTB 5\"x20 PTB 3x10 3\"- Neuo re [place] 45# 3x10 3\"     Shoulder extension       PTB 3x10 3\"- Neuro re [place] 25# 3x10 3\"     Cerv ret ?NV Supine 5\"x5 Supine 5\" x10 Seated x10 Supine 5\" x15 Supine 5\"x15  X20, x20 w/ extension     Levator stretch    10\"x5 10\"x5 10\"x5       JPE L      1x10       Corner pec stretch      15\"x4 20\"x4       Thoracic stretch over the back of the chair      5\"x10       saccades Vert x30 hold           diag HEP 1x10 ea 15\" x1 each symptoms inc 17\" x2 ea until sympt.  30\" x2 ea until sympt.         VOR x1   Horizotal only 30\"          Ambulation with horizontal HT      3 laps           Convergence x10  Convergence x10 Convergence x10        Thoracic circuit FR       5'      Open books       2x10 ea 2x10 ea     HEP card sorting   Instr. given                        Modalities               8' 8' 8' 8' 8' 8'        Access Code: [email or web address]  URL: [email or web address]  Date: 05/13/2025  Prepared by: Pema Anderson    Exercises  - Seated Assisted Cervical Rotation with Towel  - 3 x daily - 7 x weekly - 2 sets - 5 reps - 2 sec hold  - Seated Vertical Saccades  - 1 x daily - 7 x weekly - 1 sets - 30 reps  - Seated " Diagonal Saccades  - 1 x daily - 7 x weekly - 1 sets - 30 reps  - Seated Cervical Sidebending AROM  - 2 x daily - 7 x weekly - 1-2 sets - 5 reps - 5 sec hold  - Standing Scapular Retraction  - 2 x daily - 7 x weekly - 13 sets - 10 reps - 5 sec hold

## 2025-06-09 ENCOUNTER — APPOINTMENT (OUTPATIENT)
Dept: PHYSICAL THERAPY | Facility: REHABILITATION | Age: 50
End: 2025-06-09
Payer: COMMERCIAL

## 2025-06-12 ENCOUNTER — OFFICE VISIT (OUTPATIENT)
Dept: PHYSICAL THERAPY | Facility: REHABILITATION | Age: 50
End: 2025-06-12
Payer: COMMERCIAL

## 2025-06-12 DIAGNOSIS — M54.12 CERVICAL RADICULOPATHY: ICD-10-CM

## 2025-06-12 DIAGNOSIS — S06.0X0A CONCUSSION WITHOUT LOSS OF CONSCIOUSNESS, INITIAL ENCOUNTER: Primary | ICD-10-CM

## 2025-06-12 PROCEDURE — 97110 THERAPEUTIC EXERCISES: CPT

## 2025-06-12 PROCEDURE — 97140 MANUAL THERAPY 1/> REGIONS: CPT

## 2025-06-12 NOTE — PROGRESS NOTES
"Daily Note     Today's date: 2025  Patient name: Theron Rendon  : 1975  MRN: [id number]  Referring provider: Melina Farris PA-C  Dx:   Encounter Diagnosis     ICD-10-CM    1. Concussion without loss of consciousness, initial encounter  S06.0X0A       2. Cervical radiculopathy  M54.12           Start Time: 735  Stop Time: 815  Total time in clinic (min): 40 minutes    Subjective: Patient states that he had significant increase in pain since last visit. Patient states he is unable to rotate to the left without increased pain.       Objective: See treatment diary below      Assessment: Patient tolerated treatment well despite pain reported at beginning of session. Patient felt \"okay\" after treatment but continues to have pain with cervical AROM. Pt will benefit from continued skilled PT intervention in order to address remaining limitations and to restore maximal function.         Plan: Continue per plan of care.      Precautions:   Patient Active Problem List   Diagnosis    History of pulmonary embolus (PE)    Gastroesophageal reflux disease without esophagitis    Classic migraine    Essential hypertension    Extrinsic asthma    Hepatic steatosis    Generalized anxiety disorder    Lupus anticoagulant positive    BHARGAV (obstructive sleep apnea)    Vitamin D deficiency    Prepatellar bursitis    Actinic keratosis    Continuous opioid dependence (HCC)    Tinea corporis    Uncontrolled type 2 diabetes mellitus with hyperglycemia (HCC)    Pelvic pain in male    Pain in left testicle    Cervical radiculopathy    Spinal stenosis of cervical region    Pain of neck with recent traumatic injury    Weight gain    BMI 34.0-34.9,adult         Daily Treatment Diary     Assessment 5/6 5/13 5/15 5/22 5/27 5/25 6/3 6/5 6/12    Visit number 1 2 3 4 5 6 7 8 9    Eval/Reval x            FOTO     **     **   HEP Issued  x            Manuals             STM cerv  NT JG NT JG NT JMK  JG    SOR NT NT JG NT JG NT   JG    PA " "grade 2,4 C6,7,T1      NT JMK JMK G 3-4     Median and Ulnar nerve glide        JMK     Distraction retraction extension off table        JMK     Exercise Diary              UE bike        3'/3' 3'/3'    Upper cerv SNAGs w/right rot 2x5 2x5 2x5 D/C         UT stretch NV 5\"2x5 10\"x5 10\"x5 10\"x5 10\"x5       Scapular retr NV 5\"x10 5\"x20  5\"x20 Rows BTB 5\" x20 BTB 5\"x20 PTB 3x10 3\"- Neuo re Laura 45# 3x10 3\" Laura 45# 3x10 3\"    Shoulder extension       PTB 3x10 3\"- Neuro re [place] 25# 3x10 3\" [place] 25# 3x10 3\"    Cerv ret ?NV Supine 5\"x5 Supine 5\" x10 Seated x10 Supine 5\" x15 Supine 5\"x15  X20, x20 w/ extension     Levator stretch    10\"x5 10\"x5 10\"x5       JPE L      1x10       Corner pec stretch      15\"x4 20\"x4   20\"x4    Thoracic stretch over the back of the chair      5\"x10       saccades Vert x30 hold           diag HEP 1x10 ea 15\" x1 each symptoms inc 17\" x2 ea until sympt.  30\" x2 ea until sympt.         VOR x1   Horizotal only 30\"          Ambulation with horizontal HT      3 laps           Convergence x10  Convergence x10 Convergence x10        Thoracic circuit FR       5'      Open books       2x10 ea 2x10 ea 2x10 ea    HEP card sorting   Instr. given                        Modalities               8' 8' 8' 8' 8' 8'        Access Code: [email or web address]  URL: [email or web address]  Date: 05/13/2025  Prepared by: Pema Anderson    Exercises  - Seated Assisted Cervical Rotation with Towel  - 3 x daily - 7 x weekly - 2 sets - 5 reps - 2 sec hold  - Seated Vertical Saccades  - 1 x daily - 7 x weekly - 1 sets - 30 reps  - Seated Diagonal Saccades  - 1 x daily - 7 x weekly - 1 sets - 30 reps  - Seated Cervical Sidebending AROM  - 2 x daily - 7 x weekly - 1-2 sets - 5 reps - 5 sec hold  - Standing Scapular Retraction  - 2 x daily - 7 x weekly - 13 sets - 10 reps - 5 sec hold                      "

## 2025-06-13 ENCOUNTER — EVALUATION (OUTPATIENT)
Dept: PHYSICAL THERAPY | Facility: REHABILITATION | Age: 50
End: 2025-06-13
Payer: COMMERCIAL

## 2025-06-13 DIAGNOSIS — M54.12 CERVICAL RADICULOPATHY: ICD-10-CM

## 2025-06-13 DIAGNOSIS — S06.0X0A CONCUSSION WITHOUT LOSS OF CONSCIOUSNESS, INITIAL ENCOUNTER: Primary | ICD-10-CM

## 2025-06-13 PROCEDURE — 97140 MANUAL THERAPY 1/> REGIONS: CPT | Performed by: PHYSICAL THERAPIST

## 2025-06-13 PROCEDURE — 97164 PT RE-EVAL EST PLAN CARE: CPT | Performed by: PHYSICAL THERAPIST

## 2025-06-13 NOTE — PROGRESS NOTES
Daily Note     Today's date: 2025  Patient name: Theron Rendon  : 1975  MRN: [id number]  Referring provider: Melina Farris PA-C  Dx:   Encounter Diagnosis     ICD-10-CM    1. Concussion without loss of consciousness, initial encounter  S06.0X0A       2. Cervical radiculopathy  M54.12              MRI results are in showing significant foraminal narrowing at all levels and disc bulge with mild cord compression above his fusion.             Subjective:  Neck pain ranges from 3-9/10   He continues to have headaches daily.  He wakes most days with a frontal headache.          Objective: See treatment diary below    Postural Observations  Seated posture: fair  Standing posture: good  Correction of posture: has no consistent effect      Palpation   Left   Hypertonic in the upper trapezius.   Muscle spasm in the cervical paraspinals, levator scapulae and suboccipitals.   Tenderness of the cervical paraspinals, levator scapulae, suboccipitals and upper trapezius.     Right   Hypertonic in the upper trapezius.   Muscle spasm in the cervical paraspinals, and suboccipitals.   Tenderness of the cervical paraspinals, scalenes, suboccipitals and upper trapezius.     Tenderness   Cervical Spine   Tenderness in the spinous process (C3-6).       Active Range of Motion   Cervical/Thoracic Spine       Cervical  Subcranial protraction:  WFL   Subcranial retraction:  with pain   Restriction level: moderate  Flexion: 40 degrees   Extension: 55 degrees p     Left lateral flexion: 30(l pain) degrees     with pain  Right lateral flexion: 30 degrees     with pain  Left rotation: 60 degrees  Right rotation: 75 degrees    with pain  Mechanical Assessment    Cervical    Seated retraction: repeated movements   Pain location: no change  Pain intensity: worse      Tests       Additional Tests Details  Cervical distraction increased pain  Neuro Exam:               Modified VBI   Left: asymptomatic  Right: asymptomatic    Oculomotor  "exam   Oculomotor ROM: WNL  Smooth pursuits: within normal limits  Vertical saccades: WNL  Horizontal saccades: WNL  Convergence: abnormal (5 cm)  Head thrust: NP      Balance assessments   MCTSIB   Eyes open level surface: 30\"  Eyes open foam surface: 30\"  Eyes closed level surface: 30\" w/sway  Eyes closed foam surface: 30\" w/sway           Assessment: ROM has improved nicely since IE.  He continues to have head and neck pain and moderate spasm throughout.  He would benefit from continued PT to address these issues.      Goals: increase ROM left rotation 10 degrees  Decrease pain with ROM  Headaches do not occur daily  LTG: by d/c  Infrequent headaches  Able to drive without discomfort      Plan: Continue per plan of care.  2x a week for 6 weeks     Precautions:   Patient Active Problem List   Diagnosis    History of pulmonary embolus (PE)    Gastroesophageal reflux disease without esophagitis    Classic migraine    Essential hypertension    Extrinsic asthma    Hepatic steatosis    Generalized anxiety disorder    Lupus anticoagulant positive    BHARGAV (obstructive sleep apnea)    Vitamin D deficiency    Prepatellar bursitis    Actinic keratosis    Continuous opioid dependence (HCC)    Tinea corporis    Uncontrolled type 2 diabetes mellitus with hyperglycemia (HCC)    Pelvic pain in male    Pain in left testicle    Cervical radiculopathy    Spinal stenosis of cervical region    Pain of neck with recent traumatic injury    Weight gain    BMI 34.0-34.9,adult         Daily Treatment Diary     Assessment 5/6 5/13 5/15 5/22 5/27 5/25 6/3 6/5 6/12    Visit number 1 2 3 4 5 6 7 8 9    Eval/Reval x            FOTO     **     **   HEP Issued  x            Manuals             STM cerv  NT JG NT JG NT JMK  JG    SOR NT NT JG NT JG NT   JG    PA grade 2,4 C6,7,T1      NT JMK JMK G 3-4     Median and Ulnar nerve glide        JMK     Distraction retraction extension off table        JMK     Exercise Diary              UE bike        " "3'/3' 3'/3'    Upper cerv SNAGs w/right rot 2x5 2x5 2x5 D/C         UT stretch NV 5\"2x5 10\"x5 10\"x5 10\"x5 10\"x5       Scapular retr NV 5\"x10 5\"x20  5\"x20 Rows BTB 5\" x20 BTB 5\"x20 PTB 3x10 3\"- Neuo re [place] 45# 3x10 3\" [place] 45# 3x10 3\"    Shoulder extension       PTB 3x10 3\"- Neuro re [place] 25# 3x10 3\" [place] 25# 3x10 3\"    Cerv ret ?NV Supine 5\"x5 Supine 5\" x10 Seated x10 Supine 5\" x15 Supine 5\"x15  X20, x20 w/ extension     Levator stretch    10\"x5 10\"x5 10\"x5       JPE L      1x10       Corner pec stretch      15\"x4 20\"x4   20\"x4    Thoracic stretch over the back of the chair      5\"x10       saccades Vert x30 hold           diag HEP 1x10 ea 15\" x1 each symptoms inc 17\" x2 ea until sympt.  30\" x2 ea until sympt.         VOR x1   Horizotal only 30\"          Ambulation with horizontal HT      3 laps           Convergence x10  Convergence x10 Convergence x10        Thoracic circuit FR       5'      Open books       2x10 ea 2x10 ea 2x10 ea    HEP card sorting   Instr. given                        Modalities               8' 8' 8' 8' 8' 8'        Access Code: [email or web address]  URL: [email or web address]  Date: 05/13/2025  Prepared by: Pema Anderson    Exercises  - Seated Assisted Cervical Rotation with Towel  - 3 x daily - 7 x weekly - 2 sets - 5 reps - 2 sec hold  - Seated Vertical Saccades  - 1 x daily - 7 x weekly - 1 sets - 30 reps  - Seated Diagonal Saccades  - 1 x daily - 7 x weekly - 1 sets - 30 reps  - Seated Cervical Sidebending AROM  - 2 x daily - 7 x weekly - 1-2 sets - 5 reps - 5 sec hold  - Standing Scapular Retraction  - 2 x daily - 7 x weekly - 13 sets - 10 reps - 5 sec hold                      "

## 2025-06-16 ENCOUNTER — OFFICE VISIT (OUTPATIENT)
Dept: PHYSICAL THERAPY | Facility: REHABILITATION | Age: 50
End: 2025-06-16
Payer: COMMERCIAL

## 2025-06-16 DIAGNOSIS — S06.0X0A CONCUSSION WITHOUT LOSS OF CONSCIOUSNESS, INITIAL ENCOUNTER: Primary | ICD-10-CM

## 2025-06-16 DIAGNOSIS — M54.12 CERVICAL RADICULOPATHY: ICD-10-CM

## 2025-06-16 PROCEDURE — 97140 MANUAL THERAPY 1/> REGIONS: CPT | Performed by: PHYSICAL THERAPIST

## 2025-06-16 NOTE — PROGRESS NOTES
Daily Note     Today's date: 2025  Patient name: Theron Rendon  : 1975  MRN: [id number]  Referring provider: Melina Farris PA-C  Dx:   Encounter Diagnosis     ICD-10-CM    1. Concussion without loss of consciousness, initial encounter  S06.0X0A       2. Cervical radiculopathy  M54.12                       Subjective: Pt c/o having a headache today.  He declines exercises until he speaks with his doctor about MRI result.         Objective: See treatment diary below        Assessment: Moderate spasm persists.  This reduced with STM.  He f/u with the doctor Wednesday.  If cleared by the doctor he can resume exercises.     Goals: increase ROM left rotation 10 degrees  Decrease pain with ROM  Headaches do not occur daily  LTG: by d/c  Infrequent headaches  Able to drive without discomfort      Plan: Continue per plan of care.  2x a week for 6 weeks     Precautions:   Patient Active Problem List   Diagnosis    History of pulmonary embolus (PE)    Gastroesophageal reflux disease without esophagitis    Classic migraine    Essential hypertension    Extrinsic asthma    Hepatic steatosis    Generalized anxiety disorder    Lupus anticoagulant positive    BHARGAV (obstructive sleep apnea)    Vitamin D deficiency    Prepatellar bursitis    Actinic keratosis    Continuous opioid dependence (HCC)    Tinea corporis    Uncontrolled type 2 diabetes mellitus with hyperglycemia (HCC)    Pelvic pain in male    Pain in left testicle    Cervical radiculopathy    Spinal stenosis of cervical region    Pain of neck with recent traumatic injury    Weight gain    BMI 34.0-34.9,adult         Daily Treatment Diary     Assessment 6/16  5/15 5/22 5/27 5/25 6/3 6/5 6/12 6/13   Visit number 11  3 4 5 6 7 8 9 10   Eval/Reval             FOTO     **     **   HEP Issued              Manuals             STM cerv NT  JG NT JG NT JMK  JG NT   SOR NT  JG NT JG NT   JG NT   PA grade 2,4 C6,7,T1      NT JMK JONATHONK G 3-4     Median and Ulnar nerve  "glide        JMK     Distraction retraction extension off table        JMK     Exercise Diary              UE bike        3'/3' 3'/3'    Upper cerv SNAGs w/right rot   2x5 D/C         UT stretch   10\"x5 10\"x5 10\"x5 10\"x5       Scapular retr   5\"x20  5\"x20 Rows BTB 5\" x20 BTB 5\"x20 PTB 3x10 3\"- Neuo re [place] 45# 3x10 3\" [place] 45# 3x10 3\"    Shoulder extension       PTB 3x10 3\"- Neuro re [place] 25# 3x10 3\" [place] 25# 3x10 3\"    Cerv ret   Supine 5\" x10 Seated x10 Supine 5\" x15 Supine 5\"x15  X20, x20 w/ extension     Levator stretch    10\"x5 10\"x5 10\"x5       JPE L      1x10       Corner pec stretch      15\"x4 20\"x4   20\"x4    Thoracic stretch over the back of the chair      5\"x10       saccades             diag   15\" x1 each symptoms inc 17\" x2 ea until sympt.  30\" x2 ea until sympt.         VOR x1   Horizotal only 30\"          Ambulation with horizontal HT      3 laps           Convergence x10  Convergence x10 Convergence x10        Thoracic circuit FR       5'      Open books       2x10 ea 2x10 ea 2x10 ea    HEP card sorting                           Modalities              9'  8' 8' 8' 8' 8'   8'     Access Code: [email or web address]  URL: [email or web address]  Date: 05/13/2025  Prepared by: Pema Anderson    Exercises  - Seated Assisted Cervical Rotation with Towel  - 3 x daily - 7 x weekly - 2 sets - 5 reps - 2 sec hold  - Seated Vertical Saccades  - 1 x daily - 7 x weekly - 1 sets - 30 reps  - Seated Diagonal Saccades  - 1 x daily - 7 x weekly - 1 sets - 30 reps  - Seated Cervical Sidebending AROM  - 2 x daily - 7 x weekly - 1-2 sets - 5 reps - 5 sec hold  - Standing Scapular Retraction  - 2 x daily - 7 x weekly - 13 sets - 10 reps - 5 sec hold                      "

## 2025-06-19 ENCOUNTER — APPOINTMENT (OUTPATIENT)
Dept: PHYSICAL THERAPY | Facility: REHABILITATION | Age: 50
End: 2025-06-19
Payer: COMMERCIAL

## 2025-06-26 ENCOUNTER — APPOINTMENT (OUTPATIENT)
Dept: PHYSICAL THERAPY | Facility: REHABILITATION | Age: 50
End: 2025-06-26
Payer: COMMERCIAL

## 2025-07-06 DIAGNOSIS — J45.20 MILD INTERMITTENT EXTRINSIC ASTHMA WITHOUT COMPLICATION: ICD-10-CM

## 2025-07-08 RX ORDER — MONTELUKAST SODIUM 10 MG/1
10 TABLET ORAL
Qty: 90 TABLET | Refills: 1 | Status: SHIPPED | OUTPATIENT
Start: 2025-07-08

## 2025-07-22 DIAGNOSIS — E11.65 UNCONTROLLED TYPE 2 DIABETES MELLITUS WITH HYPERGLYCEMIA (HCC): ICD-10-CM

## 2025-07-22 DIAGNOSIS — M48.02 SPINAL STENOSIS OF CERVICAL REGION: ICD-10-CM

## 2025-07-25 RX ORDER — TRAMADOL HYDROCHLORIDE 50 MG/1
50 TABLET ORAL 2 TIMES DAILY PRN
Qty: 60 TABLET | Refills: 0 | Status: SHIPPED | OUTPATIENT
Start: 2025-07-25

## 2025-08-18 DIAGNOSIS — E11.65 UNCONTROLLED TYPE 2 DIABETES MELLITUS WITH HYPERGLYCEMIA (HCC): ICD-10-CM

## (undated) DEVICE — GLOVE SRG BIOGEL ORTHOPEDIC 7.5

## (undated) DEVICE — NEEDLE 25G X 1 1/2

## (undated) DEVICE — SUT MONOCRYL 4-0 PS-2 18 IN Y496G

## (undated) DEVICE — ADHESIVE SKN CLSR HISTOACRYL FLEX 0.5ML LF

## (undated) DEVICE — BETHLEHEM UNIVERSAL MINOR GEN: Brand: CARDINAL HEALTH

## (undated) DEVICE — SUT VICRYL 3-0 SH 27 IN J416H

## (undated) DEVICE — GLOVE INDICATOR PI UNDERGLOVE SZ 8 BLUE

## (undated) DEVICE — GAUZE SPONGES,16 PLY: Brand: CURITY

## (undated) DEVICE — CHLORAPREP HI-LITE 26ML ORANGE

## (undated) DEVICE — INTENDED FOR TISSUE SEPARATION, AND OTHER PROCEDURES THAT REQUIRE A SHARP SURGICAL BLADE TO PUNCTURE OR CUT.: Brand: BARD-PARKER SAFETY BLADES SIZE 15, STERILE

## (undated) DEVICE — PLUMEPEN PRO 10FT

## (undated) DEVICE — PAD GROUNDING ADULT

## (undated) DEVICE — 3000CC GUARDIAN II: Brand: GUARDIAN